# Patient Record
Sex: FEMALE | Race: WHITE | NOT HISPANIC OR LATINO | Employment: OTHER | URBAN - METROPOLITAN AREA
[De-identification: names, ages, dates, MRNs, and addresses within clinical notes are randomized per-mention and may not be internally consistent; named-entity substitution may affect disease eponyms.]

---

## 2022-05-31 ENCOUNTER — TELEPHONE (OUTPATIENT)
Dept: GYNECOLOGIC ONCOLOGY | Facility: CLINIC | Age: 77
End: 2022-05-31
Payer: MEDICARE

## 2022-05-31 NOTE — TELEPHONE ENCOUNTER
Left voicemail to call and schedule with Dr. Sharma ----- Message from Lincoln Martínez RN sent at 5/31/2022  9:40 AM CDT -----    ----- Message -----  From: Marisol Bourgeois  Sent: 5/31/2022   9:36 AM CDT  To: Zachary [facility] Staff    Please assist in scheduling pt with Dr. MAURO Sharma per referral in media.

## 2022-05-31 NOTE — TELEPHONE ENCOUNTER
----- Message from Lincoln Martínez RN sent at 5/31/2022 10:10 AM CDT -----    ----- Message -----  From: Messi Mathur  Sent: 5/31/2022   9:59 AM CDT  To: Zach[facility] Staff    Who Called:PT        Who Left Message for Patient:Erich Christiansen MA        Does the patient know what this is regarding?:YES        Best Call Back Number:[phone]        Additional Information:

## 2022-05-31 NOTE — TELEPHONE ENCOUNTER
Spoke with our patient about her insurance schedule appointment in gyn oncology she agreed she voiced understanding of the date, time and location. All questions answered appointment mail. MA/DANIEL /Preceptor Erich MENDIETA

## 2022-06-03 ENCOUNTER — OFFICE VISIT (OUTPATIENT)
Dept: GYNECOLOGIC ONCOLOGY | Facility: CLINIC | Age: 77
End: 2022-06-03
Payer: MEDICARE

## 2022-06-03 ENCOUNTER — TELEPHONE (OUTPATIENT)
Dept: SURGERY | Facility: CLINIC | Age: 77
End: 2022-06-03
Payer: MEDICARE

## 2022-06-03 ENCOUNTER — TELEPHONE (OUTPATIENT)
Dept: GYNECOLOGIC ONCOLOGY | Facility: CLINIC | Age: 77
End: 2022-06-03
Payer: MEDICARE

## 2022-06-03 ENCOUNTER — LAB VISIT (OUTPATIENT)
Dept: LAB | Facility: OTHER | Age: 77
End: 2022-06-03
Attending: OBSTETRICS & GYNECOLOGY
Payer: MEDICARE

## 2022-06-03 VITALS
DIASTOLIC BLOOD PRESSURE: 71 MMHG | SYSTOLIC BLOOD PRESSURE: 141 MMHG | WEIGHT: 293 LBS | HEART RATE: 100 BPM | BODY MASS INDEX: 48.82 KG/M2 | HEIGHT: 65 IN

## 2022-06-03 DIAGNOSIS — K92.1 HEMATOCHEZIA: ICD-10-CM

## 2022-06-03 DIAGNOSIS — R19.00 PELVIC MASS: Primary | ICD-10-CM

## 2022-06-03 LAB
ALBUMIN SERPL BCP-MCNC: 3.1 G/DL (ref 3.5–5.2)
ALP SERPL-CCNC: 94 U/L (ref 55–135)
ALT SERPL W/O P-5'-P-CCNC: 11 U/L (ref 10–44)
ANION GAP SERPL CALC-SCNC: 16 MMOL/L (ref 8–16)
AST SERPL-CCNC: 19 U/L (ref 10–40)
BASOPHILS # BLD AUTO: 0.08 K/UL (ref 0–0.2)
BASOPHILS NFR BLD: 1.4 % (ref 0–1.9)
BILIRUB SERPL-MCNC: 0.4 MG/DL (ref 0.1–1)
BUN SERPL-MCNC: 14 MG/DL (ref 8–23)
CALCIUM SERPL-MCNC: 9.8 MG/DL (ref 8.7–10.5)
CHLORIDE SERPL-SCNC: 103 MMOL/L (ref 95–110)
CO2 SERPL-SCNC: 20 MMOL/L (ref 23–29)
CREAT SERPL-MCNC: 0.7 MG/DL (ref 0.5–1.4)
DIFFERENTIAL METHOD: ABNORMAL
EOSINOPHIL # BLD AUTO: 0.1 K/UL (ref 0–0.5)
EOSINOPHIL NFR BLD: 2.1 % (ref 0–8)
ERYTHROCYTE [DISTWIDTH] IN BLOOD BY AUTOMATED COUNT: 14.8 % (ref 11.5–14.5)
EST. GFR  (AFRICAN AMERICAN): >60 ML/MIN/1.73 M^2
EST. GFR  (NON AFRICAN AMERICAN): >60 ML/MIN/1.73 M^2
GLUCOSE SERPL-MCNC: 95 MG/DL (ref 70–110)
HCT VFR BLD AUTO: 35.7 % (ref 37–48.5)
HGB BLD-MCNC: 10.7 G/DL (ref 12–16)
IMM GRANULOCYTES # BLD AUTO: 0.01 K/UL (ref 0–0.04)
IMM GRANULOCYTES NFR BLD AUTO: 0.2 % (ref 0–0.5)
LYMPHOCYTES # BLD AUTO: 1.3 K/UL (ref 1–4.8)
LYMPHOCYTES NFR BLD: 21.8 % (ref 18–48)
MCH RBC QN AUTO: 29.1 PG (ref 27–31)
MCHC RBC AUTO-ENTMCNC: 30 G/DL (ref 32–36)
MCV RBC AUTO: 97 FL (ref 82–98)
MONOCYTES # BLD AUTO: 0.6 K/UL (ref 0.3–1)
MONOCYTES NFR BLD: 10.7 % (ref 4–15)
NEUTROPHILS # BLD AUTO: 3.7 K/UL (ref 1.8–7.7)
NEUTROPHILS NFR BLD: 63.8 % (ref 38–73)
NRBC BLD-RTO: 0 /100 WBC
PLATELET # BLD AUTO: 148 K/UL (ref 150–450)
PMV BLD AUTO: 12.1 FL (ref 9.2–12.9)
POTASSIUM SERPL-SCNC: 4.2 MMOL/L (ref 3.5–5.1)
PROT SERPL-MCNC: 6.5 G/DL (ref 6–8.4)
RBC # BLD AUTO: 3.68 M/UL (ref 4–5.4)
SODIUM SERPL-SCNC: 139 MMOL/L (ref 136–145)
WBC # BLD AUTO: 5.77 K/UL (ref 3.9–12.7)

## 2022-06-03 PROCEDURE — 36415 COLL VENOUS BLD VENIPUNCTURE: CPT | Performed by: OBSTETRICS & GYNECOLOGY

## 2022-06-03 PROCEDURE — 99205 OFFICE O/P NEW HI 60 MIN: CPT | Mod: S$PBB,,, | Performed by: OBSTETRICS & GYNECOLOGY

## 2022-06-03 PROCEDURE — 80053 COMPREHEN METABOLIC PANEL: CPT | Performed by: OBSTETRICS & GYNECOLOGY

## 2022-06-03 PROCEDURE — 99999 PR PBB SHADOW E&M-EST. PATIENT-LVL III: CPT | Mod: PBBFAC,,, | Performed by: OBSTETRICS & GYNECOLOGY

## 2022-06-03 PROCEDURE — 99213 OFFICE O/P EST LOW 20 MIN: CPT | Mod: PBBFAC | Performed by: OBSTETRICS & GYNECOLOGY

## 2022-06-03 PROCEDURE — 99999 PR PBB SHADOW E&M-EST. PATIENT-LVL III: ICD-10-PCS | Mod: PBBFAC,,, | Performed by: OBSTETRICS & GYNECOLOGY

## 2022-06-03 PROCEDURE — 85025 COMPLETE CBC W/AUTO DIFF WBC: CPT | Performed by: OBSTETRICS & GYNECOLOGY

## 2022-06-03 PROCEDURE — 99205 PR OFFICE/OUTPT VISIT, NEW, LEVL V, 60-74 MIN: ICD-10-PCS | Mod: S$PBB,,, | Performed by: OBSTETRICS & GYNECOLOGY

## 2022-06-03 RX ORDER — LEVOFLOXACIN 500 MG/1
500 TABLET, FILM COATED ORAL DAILY
COMMUNITY
Start: 2022-05-12 | End: 2022-07-21

## 2022-06-03 RX ORDER — ASPIRIN 81 MG/1
81 TABLET ORAL DAILY
COMMUNITY

## 2022-06-03 RX ORDER — MULTIVITAMIN
1 TABLET ORAL DAILY
Status: ON HOLD | COMMUNITY
End: 2022-08-19 | Stop reason: HOSPADM

## 2022-06-03 RX ORDER — TELMISARTAN AND HYDROCHLORTHIAZIDE 40; 12.5 MG/1; MG/1
1 TABLET ORAL DAILY
COMMUNITY
Start: 2021-10-08 | End: 2022-07-21

## 2022-06-03 RX ORDER — FERROUS SULFATE 325(65) MG
325 TABLET ORAL DAILY
Status: ON HOLD | COMMUNITY
End: 2022-08-19 | Stop reason: HOSPADM

## 2022-06-03 RX ORDER — LANOLIN ALCOHOL/MO/W.PET/CERES
1000 CREAM (GRAM) TOPICAL DAILY
Status: ON HOLD | COMMUNITY
End: 2022-08-19 | Stop reason: HOSPADM

## 2022-06-03 RX ORDER — OXYBUTYNIN CHLORIDE 10 MG/1
1 TABLET, EXTENDED RELEASE ORAL DAILY
COMMUNITY
Start: 2021-06-18 | End: 2022-06-17

## 2022-06-03 RX ORDER — METOPROLOL SUCCINATE 25 MG/1
1 TABLET, EXTENDED RELEASE ORAL DAILY
COMMUNITY
Start: 2022-05-12 | End: 2023-05-11

## 2022-06-03 RX ORDER — CEPHALEXIN 500 MG/1
500 CAPSULE ORAL EVERY 4 HOURS PRN
COMMUNITY
Start: 2022-05-22 | End: 2022-07-21

## 2022-06-03 RX ORDER — DOXYCYCLINE 100 MG/1
100 CAPSULE ORAL 2 TIMES DAILY
COMMUNITY
Start: 2022-05-25 | End: 2022-07-21

## 2022-06-03 RX ORDER — ACETAMINOPHEN 500 MG
5000 TABLET ORAL DAILY
Status: ON HOLD | COMMUNITY
End: 2022-08-19 | Stop reason: HOSPADM

## 2022-06-03 NOTE — PROGRESS NOTES
Please let her know that blood count is reassuring and actually increased from hospitalization.  We will work to coordinate next steps with colorectal.

## 2022-06-03 NOTE — TELEPHONE ENCOUNTER
----- Message from James Bradley sent at 6/3/2022  4:11 PM CDT -----  Type:  Patient Returning Call         Who Called: JAKOB CONNELL [[id number]]         Who Left Message for Patient: Yahaira         Does the patient know what this is regarding? Y         Best Call Back Number:[phone]         Additional Information:

## 2022-06-03 NOTE — PROGRESS NOTES
REFERRING PROVIDER  Leland Song MD     COLORECTAL  Clinton Shoemaker MD;  Galen Davis MD    HISTORY OF PRESENT CONDITION  CC: Pelvic Mass, Hematochezia, History of Endometrial Cancer  Linda Pierson is a 77 y.o. with an 8.5 cm mass of lying between the vagina and rectosigmoid.  She has a history of Stage IAG1 endometrial cancer in 9/13/2016 treated with robotic hysterectomy and staging by Dr. Briscoe at [facility].  She states she has had multiple episodes of BRBPR since being diagnosed and hospitalized.     DATA REVIEWED  5/9/2022 CT AP: 1. An 8.5 x 7.5 cm mass within the pelvis is noted and hemorrhage into  an abscess is suspected.  2. Large ventral abdominal wall hernia containing bowel loops is noted.  3. Postoperative changes     5/10/2022 MR Pelvis:  1. There is circumferential mural thickening and hyperenhancement of  the upper rectum as it abuts a 7.8 cm mass within the pelvis. No obvious malignancy can be documented.The bulk of this lesion appears to lie above the peritoneal reflection  2. Large ventral abdominal wall hernia    5/12/2022 Vaginal Cuff Biopsy:  -Fibroadipose tissue with ulcer and granulation tissue.  -Negative for carcinoma.  Rectal mass, biopsy:  -Necroinflammatory debris (see comment).   -No mucosa present for evaluation.  -Negative for carcinoma.    OF NOTE:  History of Aortic Stenosis with Atrial Fibrillation, was on Eliquis.      Past Medical History:   Diagnosis Date    Anemia     Hypertension       Current Outpatient Medications   Medication Sig    apixaban (ELIQUIS) 5 mg Tab Take 1 tablet by mouth 2 (two) times daily.    aspirin (ECOTRIN) 81 MG EC tablet Take 81 mg by mouth Daily.    calcium-vitamin D 600 mg-10 mcg (400 unit) Tab Take 1 tablet by mouth once daily.    cephALEXin (KEFLEX) 500 MG capsule Take 500 mg by mouth every 4 (four) hours as needed.    cholecalciferol, vitamin D3, 125 mcg (5,000 unit) Tab Take 5,000 Units by mouth Daily.     cyanocobalamin (VITAMIN B-12) 1000 MCG tablet Take 1,000 mcg by mouth Daily.    doxycycline (VIBRAMYCIN) 100 MG Cap Take 100 mg by mouth 2 (two) times daily.    ferrous sulfate (FEOSOL) 325 mg (65 mg iron) Tab tablet Take 325 mg by mouth Daily.    levoFLOXacin (LEVAQUIN) 500 MG tablet Take 500 mg by mouth Daily.    metoprolol succinate (TOPROL-XL) 25 MG 24 hr tablet Take 1 tablet by mouth once daily.    oxybutynin (DITROPAN-XL) 10 MG 24 hr tablet Take 1 tablet by mouth once daily.    telmisartan-hydrochlorothiazide (MICARDIS HCT) 40-12.5 mg per tablet Take 1 tablet by mouth once daily.     No current facility-administered medications for this visit.     Review of patient's allergies indicates:  No Known Allergies    Past Surgical History:   Procedure Laterality Date    APPENDECTOMY  1991    [facility], [place]    BILATERAL SALPINGO-OOPHORECTOM[place] (BSO) [place]     SECTION  1967    [place], [place]    G[place]      [place]    GASTRIC BYPASS  1988    [facility] [place]    HERNIA REPAIR  1988    [facility]oval [facility] excess fat ([facility] [place]    HERNIA REPAIR  2005    [employer] ([place]    HERN[place] REPAIR  2018    [facility] [place]    HYST[place]Y [place]ilateral [place]    KNEE SURGERY Right 2004    [employer], ([place]    KNEE SURGERY Left 2013    [employer] ([place]      FAMILY HISTORY  History reviewed. No pertinent family history.    SOCIAL HISTORY    reports that she has never smoked. She has never used smokeless tobacco. She reports previous alcohol use. She reports previous drug use.    OBJECTIVE   Vitals:    22 0948   BP: (!) 141/71   Pulse: 100      Body mass index is 51.42 kg/m².     Physical Exam:   Constitutional: She is oriented to person, place, and time. She appears well-developed and well-nourished. No distress.    HENT:    Head: Normocephalic and atraumatic.    Eyes: Conjunctivae and EOM are normal.      Pulmonary/Chest: Effort normal. No respiratory distress.        Abdominal: Soft. She exhibits no distension and no mass. There is no abdominal tenderness. There is no rebound and no guarding. No hernia.     Genitourinary:    Genitourinary Comments: Vulvar - normal  Vagina - Abnormal tissue mass at vaginal cuff but very limited exam due to pain and blood obscuring visualization                 Neurological: She is alert and oriented to person, place, and time.    Skin: No rash noted.    Psychiatric: She has a normal mood and affect. Her behavior is normal.     LABORATORY DATA  Lab data reviewed.    RADIOLOGICAL DATA  Radiology data reviewed.    PATHOLOGY DATA  Pathology data reviewed.    ASSESSMENT    1. Pelvic mass    2. Hematochezia     Large pelvic mass at vaginal cuff involving rectum of uncertain etiology.  Recurrence of stage I endometrial cancer is rare, but certainly a possibility though biopsies thus far have been indeterminate.  I am recommending combined procedure with colorectal surgery likely involving LAR with upper vaginectomy.  Patient is to be seen by Dr. Davis with colorectal and he and I will work to coordinate.     PLAN  Orders Placed This Encounter   Procedures    CBC Auto Differential    COMPREHENSIVE METABOLIC PANEL       1.  Follow-up with Dr. Davis  2.  Will coordinate likely surgery with Dr. Davis after his consultation      Stephan Sharma MD

## 2022-06-03 NOTE — TELEPHONE ENCOUNTER
Pt advise of Dr. Sharma's advice regarding lab work, verbalize understanding, and has no further questions.

## 2022-06-03 NOTE — TELEPHONE ENCOUNTER
"Explained to pt that we will need:  1) disk containing imaging of MRI pelvis 5/10/22, CT ab/pel 5-9-22 and CT c-a-p 1/3/18  2) photos taken during 5/11/22 scope    Annalisa from Dr Shoemaker's office said these can be prepared for her to  and bring to appointment.  Pt said she will call them to set date for  and she'll call to schedule apptmnt with Dr Elias.      =====================  From: SALAS Elias M.D. <[email or web address]>   Sent: Tuesday, May 31, 2022 3:39 PM  To: Renee Jacob <[email or web address]>; Joyce Caceres <[email or web address]>    Thank you.  The images would be perfect along with the op notes. Thank you for your help with this!!    Galen Elias MD        From: Renee Jacob <[email or web address]>  Sent: Tuesday, May 31, 2022 3:25:48 PM  To: SALAS Elias M.D. <[email or web address]>; Joyce Caceres <[email or web address]>  Subject: FW: GOPI Pierson [id number]      [facility] referral (see media and Care Everywhere).    Dr Clinton Shoemaker referring to Dr. Elias and Dr Song referring to Dr. Sharma as this case "requires multi-D care at a tertiary care center..."  Referrals mentioned this case was discussed with Boogie Elias and Zachary.    ==================================  Hx of Grade I, Stage IA Endometrial Cancer  S/P TRH BSO Staging by Dr. Briscoe at [facility] 9/2016  Hematochezia   Mass at vaginal cuff impinging on rectum  Rule out recurrent cancer which is unlikely but possible vs diverticular abscess  S/P EUA and true cut biopsies of pelvic mass  ==================================    DR. ELIAS: no CRC established.  Will you be needing imaging disk (MRI pelvis and CT abd/pel)? Anything else?  Care Everywhere shows 5/11 they did transvaginal biopsy, EUA, proctoscopy and sigmoidoscopy with bx.    PATH: A.  Pelvic mass/vaginal cuff, biopsy:-Fibroadipose tissue with ulcer and granulation " tissue.-Negative for carcinoma.   B.  Rectal mass, biopsy:-Necroinflammatory debris (see comment).   -No mucosa present for evaluation.-Negative for carcinoma.Comment: The patient's history is reviewed.  Rebiopsy is recommended if clinically indicated.    [place]: cc'ing you as we may have to schedule back-to-back gyn onc and CRS apptmnts

## 2022-06-03 NOTE — TELEPHONE ENCOUNTER
----- Message from Stephan Sharma MD sent at 6/3/2022  1:12 PM CDT -----  Please let her know that blood count is reassuring and actually increased from hospitalization.  We will work to coordinate next steps with colorectal.

## 2022-06-09 ENCOUNTER — TELEPHONE (OUTPATIENT)
Dept: SURGERY | Facility: CLINIC | Age: 77
End: 2022-06-09
Payer: MEDICARE

## 2022-06-10 ENCOUNTER — OFFICE VISIT (OUTPATIENT)
Dept: SURGERY | Facility: CLINIC | Age: 77
End: 2022-06-10
Payer: MEDICARE

## 2022-06-10 ENCOUNTER — HOSPITAL ENCOUNTER (OUTPATIENT)
Dept: RADIOLOGY | Facility: HOSPITAL | Age: 77
Discharge: HOME | End: 2022-06-10
Attending: COLON & RECTAL SURGERY
Payer: MEDICARE

## 2022-06-10 VITALS
HEIGHT: 65 IN | SYSTOLIC BLOOD PRESSURE: 142 MMHG | HEART RATE: 100 BPM | WEIGHT: 293 LBS | BODY MASS INDEX: 48.82 KG/M2 | DIASTOLIC BLOOD PRESSURE: 78 MMHG

## 2022-06-10 DIAGNOSIS — M79.89 SWELLING OF LOWER EXTREMITY: ICD-10-CM

## 2022-06-10 DIAGNOSIS — K62.89 RECTAL MASS: Primary | ICD-10-CM

## 2022-06-10 DIAGNOSIS — K62.89 RECTAL MASS: ICD-10-CM

## 2022-06-10 DIAGNOSIS — I48.0 PAROXYSMAL ATRIAL FIBRILLATION: ICD-10-CM

## 2022-06-10 PROCEDURE — 99205 OFFICE O/P NEW HI 60 MIN: CPT | Mod: S$PBB,,, | Performed by: COLON & RECTAL SURGERY

## 2022-06-10 PROCEDURE — 88342 IMHCHEM/IMCYTCHM 1ST ANTB: CPT | Mod: 26,,, | Performed by: PATHOLOGY

## 2022-06-10 PROCEDURE — 99205 PR OFFICE/OUTPT VISIT, NEW, LEVL V, 60-74 MIN: ICD-10-PCS | Mod: S$PBB,,, | Performed by: COLON & RECTAL SURGERY

## 2022-06-10 PROCEDURE — 72195 MRI PELVIS W/O DYE: CPT | Mod: 26,,, | Performed by: RADIOLOGY

## 2022-06-10 PROCEDURE — 99999 PR PBB SHADOW E&M-EST. PATIENT-LVL III: CPT | Mod: PBBFAC,,, | Performed by: COLON & RECTAL SURGERY

## 2022-06-10 PROCEDURE — 72195 MRI RECTAL CANCER WITHOUT CONTRAST: ICD-10-PCS | Mod: 26,,, | Performed by: RADIOLOGY

## 2022-06-10 PROCEDURE — 88305 TISSUE EXAM BY PATHOLOGIST: ICD-10-PCS | Mod: 26,,, | Performed by: PATHOLOGY

## 2022-06-10 PROCEDURE — 72195 MRI PELVIS W/O DYE: CPT | Mod: TC

## 2022-06-10 PROCEDURE — 99999 PR PBB SHADOW E&M-EST. PATIENT-LVL III: ICD-10-PCS | Mod: PBBFAC,,, | Performed by: COLON & RECTAL SURGERY

## 2022-06-10 PROCEDURE — 88305 TISSUE EXAM BY PATHOLOGIST: CPT | Mod: 26,,, | Performed by: PATHOLOGY

## 2022-06-10 PROCEDURE — 88342 IMHCHEM/IMCYTCHM 1ST ANTB: CPT | Performed by: PATHOLOGY

## 2022-06-10 PROCEDURE — 88342 CHG IMMUNOCYTOCHEMISTRY: ICD-10-PCS | Mod: 26,,, | Performed by: PATHOLOGY

## 2022-06-10 PROCEDURE — 99213 OFFICE O/P EST LOW 20 MIN: CPT | Mod: PBBFAC,25 | Performed by: COLON & RECTAL SURGERY

## 2022-06-10 PROCEDURE — 88305 TISSUE EXAM BY PATHOLOGIST: CPT | Performed by: PATHOLOGY

## 2022-06-10 NOTE — PROGRESS NOTES
CRS Office Visit History and Physical    Referring Md:   Clinton Shoemaker Md  [address] [place] [address] [address]  [place] [address]    SUBJECTIVE:     Chief Complaint:  Pelvic mass    History of Present Illness:  The patient is a new patient to this practice.   Course is as follows:  Patient is a 77 y.o. female presents with pelvic mass  History of endometrial cancer which was completely excised and 2016.  She is on therapeutic anticoagulation secondary to AFib.  Presented with hematochezia in May 2022.  Workup demonstrated a Large necrotic pelvic mass with intrusion into the rectum  05/11/2022:  Exam under anesthesia with multiple biopsies of a pelvic/vaginal cuff mass.  Flexible sigmoidoscopy demonstrated a mass in the rectosigmoid colon which appeared extrinsic with compression of the rectosigmoid colon starting at approximately 12 cm in the anal verge.  Biopsies performed demonstrate necroinflammatory debris with no sign of carcinoma.    She presents for evaluation with her daughter and .  She is predominantly wheelchair bound secondary to significant bilateral lower extremity swelling over the past several weeks.  She was previously on Eliquis but stopped secondary to hematochezia.  Complains of pain and swelling in her bilateral lower extremities.  DVT ultrasound of 05/21/2022 demonstrated no DVT. Spending the majority of her day in the house on the toilet secondary to fecal urgency and leakage.  Historically, she had daily bowel movements.  Incontinent to liquid stool.  No tobacco abuse.  No family history of colon cancer or inflammatory bowel disease.    Echo 05/10/2022 demonstrated normal ejection fraction with no aortic stenosis    Multiple past abdominal surgeries. 2018 underwent ventral hernia repair with a large piece of mesh implanted.  Hernia since recurred.    Review of patient's allergies indicates:  No Known Allergies    Past Medical History:   Diagnosis Date    Anemia     Aortic stenosis   "   Hypertension     Paroxysmal atrial fibrillation     Uterine cancer      Past Surgical History:   Procedure Laterality Date    APPEN[place]CTOMY  1991    [facility], [place]    BILATERAL SALPINGO-OOPHORECTOMY (BSO) [place]ilateral [place]    [place]     SECTION  1967    [facility] [place] [place]    GALLBLADDER SURGERY      [facility], [place], [place]    [place]  [facility]    [facility] [place]    HERNIA REPAIR  1988    Removal of excess fat ([facility], [place]    HERNIA REPAIR  2005    [employer] ([place]    HERNIA REPAIR  2018    [facility] [place]    HY[place]    KNEE SURGERY Right 2004    [employer], ([place]    KNEE SURGERY Left 2013    [employer], ([place]     No family history on file.  Social History     Tobacco Use    Smoking status: Never Smoker    Smokeless tobacco: Never Used   Substance Use Topics    Alcohol use: Not Currently    Drug use: Not Currently        Review of Systems:  Review of Systems   Constitutional: Positive for malaise/fatigue. Negative for chills, diaphoresis, fever and weight loss.   HENT: Negative for congestion.    Respiratory: Negative for shortness of breath.    Cardiovascular: Positive for leg swelling. Negative for chest pain.   Gastrointestinal: Positive for abdominal pain, blood in stool and diarrhea. Negative for constipation, nausea and vomiting.   Genitourinary: Negative for dysuria.   Musculoskeletal: Positive for joint pain. Negative for back pain and myalgias.   Skin: Negative for rash.   Neurological: Positive for weakness. Negative for dizziness.   Endo/Heme/Allergies: Does not bruise/bleed easily.   Psychiatric/Behavioral: Negative for depression.       OBJECTIVE:     Vital Signs (Most Recent)  BP (!) 142/78 (BP Location: Left arm, Patient Position: Sitting, BP Method: Medium (Manual))   Pulse 100   Ht 5' 5" (1.651 m)   Wt " (!) 143.3 kg (315 lb 14.4 oz)   BMI 52.57 kg/m²     Physical Exam:  General: White female in no distress   Neuro: alert and oriented x 4.  Moves all extremities.     HEENT: no icterus.  Trachea midline  Respiratory: respirations are even and unlabored  Cardiac: regular rate  Abdomen:  Protuberant abdomen with large midline incisional hernia that is tense.  Extremities: Warm dry and intact.  Bilateral lower extremity tense edema  Skin:  Rash on bilateral lower extremities  Anorectal:  External exam was normal.  Digital exam performed.  Normal tone.  Normal squeeze pressure.    Labs:  H&H 11 and 36. Albumin 3.1.  Normal renal function.  CEA 3.26.   mildly elevated at 46    Imaging:   MRI 5/10/22: 7.8 x 6.4 x 7.4cm mass abutting the upper rectum.  Bulk of the lesion above the peritoneal reflection.  No abnormal lymph nodes.  CT abdomen pelvis 05/09/2022  large mass in the pelvis with a large ventral abdominal wall hernia.    ASSESSMENT/PLAN:     Linda was seen today for mass.    Diagnoses and all orders for this visit:    Rectal mass  -     Cancel: MRI Rectal Cancer W W/O Contrast; Future  -     Specimen to Pathology Gastrointestinal tract    Paroxysmal atrial fibrillation    Swelling of lower extremity        77 y.o. female with mid rectal necrotic tumor of unclear etiology.  Biopsies pending.  Based on outside hospital imaging, no signs of metastatic disease.  Tumor appears very symptomatic to her causing frequent tenesmus, rectal leakage, and is likely related to the increase in her bilateral lower extremity swelling.      Functionally, she is very limited secondary to significant bilateral lower extremity swelling that has increased over the past several weeks.  She is predominantly wheelchair bound.  Previously, she was ambulatory.  Her echocardiogram demonstrated normal ejection fraction with no significant aortic stenosis.  DVT ultrasound negative.  I will reach out to her cardiologist to see if there is  any potential optimization prior to intervention.  Further, repeat MRI done here.    I was unable to load her images from outside hospital.  Will have those loaded for review.  Discussed that she would likely need proctectomy with partial vaginectomy as well as abdominal wall reconstruction.  Discussed this would be a very extensive surgery and I am concern for her ability to tolerate a the long major abdominal surgery.  Discussed that she would likely need temporary versus permanent fecal diversion.  With her limited mobility, I am concerned regarding her function postoperatively after a low anterior resection.  Will discuss with her further regarding potential end colostomy.    I will follow up with her once her images were loaded in her MRI has been reviewed.  Additionally, I will reach out to her cardiologist for optimization.  Lastly, if she has been medically optimized, will plan to coordinate with Dr. Sharma regarding multivisceral resection and abdominal wall reconstruction with likely end colostomy.    Flexible sigmoidoscopy:  Following digital rectal exam, flexible endoscope was inserted advanced up to 30 cm.  At approximately 8 cm, large, necrotic tumor seen.  Biopsies taken.  Distal rectum appears clear.    SALAS Davis MD, FACS, FASCRS  Staff Surgeon  Colon & Rectal Surgery

## 2022-06-15 LAB
FINAL PATHOLOGIC DIAGNOSIS: NORMAL
GROSS: NORMAL
Lab: NORMAL

## 2022-06-21 ENCOUNTER — TELEPHONE (OUTPATIENT)
Dept: SURGERY | Facility: CLINIC | Age: 77
End: 2022-06-21
Payer: MEDICARE

## 2022-06-22 ENCOUNTER — TELEPHONE (OUTPATIENT)
Dept: SURGERY | Facility: CLINIC | Age: 77
End: 2022-06-22
Payer: MEDICARE

## 2022-06-22 ENCOUNTER — DOCUMENTATION ONLY (OUTPATIENT)
Dept: SURGERY | Facility: CLINIC | Age: 77
End: 2022-06-22
Payer: MEDICARE

## 2022-06-22 DIAGNOSIS — K62.89 RECTAL MASS: Primary | ICD-10-CM

## 2022-06-22 NOTE — TELEPHONE ENCOUNTER
Left voicemail with callback number in reference to scheduling CT prior to surgery. Instructed to call back.

## 2022-06-22 NOTE — TELEPHONE ENCOUNTER
----- Message from SALAS Davis MD sent at 6/22/2022  7:23 AM CDT -----  Can she be set up for a contrasted CT here?  It will need to be done at [facility] for preoperative planning and they have agreed to give her contrast.       Just please let her know that this will be a different CT from what she has had and that we need it to plan a resection for her.  I will call her once the scan is done.    Thank you!    Galen

## 2022-06-22 NOTE — PROGRESS NOTES
[facility]  Colon, Rectal and Anal Malignancies  Multi-disciplinary Tumor Board     [address]  [place], [place]  Tel: [phone]  Fax: [phone]  [email or web address]     Patient name: Shira Pierson   YOB: 1945   MRN: [id number]    Date of discussion: 06/22/2022    Thank you for referring Ms. Pierson to our care here at [facility]. Please allow us to summarize our review of records and recommendations.    The following disciplines were present for the discussion:    Colon and Rectal Surgery   Medical Oncology   Radiation Oncology   Pathology   Radiology    Endoscopy reviewed:    Date performed: 5/11/2022   Yes, incomplete evaluation of colon and rectum performed    CT Chest, Abdomen and Pelvis    Date performed: 5/9/2022    Performed at our facility: No   Metastatic disease present: Yes, suspected    MRI Rectal Cancer Protocol   Date performed: 6/10/2022    Performed at our facility: No   Tumor location in the rectum: Not performed   Sphincter involvement: Not performed   Pretreatment circumferential resection margin status: Not performed    Pathology reviewed:    Date pathology finalized: 6/15/2022   Yes, report only     Pre-treatment CEA:   Date performed: 5/9/2022   CEA Level: 3.26    Pre-treatment Clinical Stage:   TX - not assessed   NX - not assessed   M1 - Metastasis suspected or confirmed    Based on our review of the current information we have made the following recommendations:  Summary: 77F c history of endometrial cancer s/p resection (2016) now with hematochezia and workup demonstrating a large necrotic pelvic tumor with intrusion into the rectum. Prior biopsies notable for necrotic inflammatory tissue, although high concern for malignancy based on imaging. Plan to repeat imaging (CT with IV, PO, and Rectal contrast) and consult IR for possible biopsy to obtain tissue diagnosis, however surgery may be required to palliate  symptoms.      Additional laboratory, imaging, or endoscopic studies   CT Abdomen and Pelvis   Confirm pathologic diagnosis    Therapies   Pending further workup (CT, IR biopsy)    Clinical research study eligibility - No    Referrals   Interventional Radiology    Please do not hesitate to contact us for any questions.

## 2022-06-23 ENCOUNTER — TELEPHONE (OUTPATIENT)
Dept: SURGERY | Facility: CLINIC | Age: 77
End: 2022-06-23
Payer: MEDICARE

## 2022-06-23 NOTE — TELEPHONE ENCOUNTER
Spoke with patient and explained situation of having CT with contrast in [place]. Patient agreeable to plan and verbalizes understanding. Instructed patient that I will be in touch with her once CT is scheduled via radiology (due to shortage). Secure chat sent to Dr. Baer for approval.

## 2022-06-23 NOTE — TELEPHONE ENCOUNTER
----- Message from Jennifer Pinto sent at 6/23/2022  8:12 AM CDT -----  Contact: pt  Pt returning call to staff. RE: please give pt another call.      Confirmed contact below:  Contact Name:Linda Pierson  Phone Number: [phone]

## 2022-06-23 NOTE — TELEPHONE ENCOUNTER
----- Message from SALAS Davis MD sent at 6/22/2022  7:23 AM CDT -----  Can she be set up for a contrasted CT here?  It will need to be done at [facility] for preoperative planning and they have agreed to give her contrast.       Just please let her know that this will be a different CT from what she has had and that we need it to plan a resection for her.  I will call her once the scan is done.    Thank you!    Galen

## 2022-06-23 NOTE — TELEPHONE ENCOUNTER
Spoke with patient and confirmed CT appointment details verbally over phone. Patient verbalizes understanding.

## 2022-06-27 ENCOUNTER — HOSPITAL ENCOUNTER (OUTPATIENT)
Dept: RADIOLOGY | Facility: HOSPITAL | Age: 77
Discharge: HOME | End: 2022-06-27
Attending: COLON & RECTAL SURGERY
Payer: MEDICARE

## 2022-06-27 DIAGNOSIS — K62.89 RECTAL MASS: ICD-10-CM

## 2022-06-27 PROCEDURE — 74177 CT ABD & PELVIS W/CONTRAST: CPT | Mod: 26,,, | Performed by: STUDENT IN AN ORGANIZED HEALTH CARE EDUCATION/TRAINING PROGRAM

## 2022-06-27 PROCEDURE — 74177 CT ABD & PELVIS W/CONTRAST: CPT | Mod: TC

## 2022-06-27 PROCEDURE — 25500020 PHARM REV CODE 255: Performed by: COLON & RECTAL SURGERY

## 2022-06-27 PROCEDURE — 74177 CT ABDOMEN PELVIS WITH CONTRAST: ICD-10-PCS | Mod: 26,,, | Performed by: STUDENT IN AN ORGANIZED HEALTH CARE EDUCATION/TRAINING PROGRAM

## 2022-06-27 RX ADMIN — IOHEXOL 75 ML: 350 INJECTION, SOLUTION INTRAVENOUS at 03:06

## 2022-06-27 RX ADMIN — IOHEXOL 60 ML: 350 INJECTION, SOLUTION INTRAVENOUS at 03:06

## 2022-06-30 ENCOUNTER — TELEPHONE (OUTPATIENT)
Dept: SURGERY | Facility: CLINIC | Age: 77
End: 2022-06-30
Payer: MEDICARE

## 2022-06-30 DIAGNOSIS — K76.9 LIVER LESION: ICD-10-CM

## 2022-06-30 DIAGNOSIS — R19.00 PELVIC MASS: Primary | ICD-10-CM

## 2022-06-30 NOTE — TELEPHONE ENCOUNTER
Spoke with patient. States that she missed Dr Davis's call yesterday in regards to CT results and plan of care. Epic message sent to Dr Davis.

## 2022-06-30 NOTE — TELEPHONE ENCOUNTER
Called patient to discuss recent CT scan.  Multiple small liver hypodensities noted.  Spoke with Interventional Radiology who believes that the liver lesions are amenable to ultrasound-guided biopsy.  Discussed that we will plan for liver biopsy 1st to determine if the pelvic mass is malignant and can be treated with preoperative radiation or chemotherapy as she is at very high risk for perioperative complication.  I will follow up with her after the liver biopsy is done.    SALAS Davis MD, FACS, FASCRS  Staff Surgeon  Colon & Rectal Surgery

## 2022-07-01 ENCOUNTER — TELEPHONE (OUTPATIENT)
Dept: INTERVENTIONAL RADIOLOGY/VASCULAR | Facility: CLINIC | Age: 77
End: 2022-07-01
Payer: MEDICARE

## 2022-07-21 ENCOUNTER — LAB VISIT (OUTPATIENT)
Dept: LAB | Facility: HOSPITAL | Age: 77
End: 2022-07-21
Payer: MEDICARE

## 2022-07-21 ENCOUNTER — OFFICE VISIT (OUTPATIENT)
Dept: INTERVENTIONAL RADIOLOGY/VASCULAR | Facility: CLINIC | Age: 77
End: 2022-07-21
Payer: MEDICARE

## 2022-07-21 VITALS — HEART RATE: 116 BPM | SYSTOLIC BLOOD PRESSURE: 103 MMHG | DIASTOLIC BLOOD PRESSURE: 61 MMHG

## 2022-07-21 DIAGNOSIS — K76.9 LIVER LESION: ICD-10-CM

## 2022-07-21 DIAGNOSIS — R19.00 PELVIC MASS: ICD-10-CM

## 2022-07-21 LAB
BASOPHILS # BLD AUTO: 0.03 K/UL (ref 0–0.2)
BASOPHILS NFR BLD: 0.2 % (ref 0–1.9)
DIFFERENTIAL METHOD: ABNORMAL
EOSINOPHIL # BLD AUTO: 0 K/UL (ref 0–0.5)
EOSINOPHIL NFR BLD: 0.2 % (ref 0–8)
ERYTHROCYTE [DISTWIDTH] IN BLOOD BY AUTOMATED COUNT: 16.6 % (ref 11.5–14.5)
HCT VFR BLD AUTO: 27.7 % (ref 37–48.5)
HGB BLD-MCNC: 8.6 G/DL (ref 12–16)
IMM GRANULOCYTES # BLD AUTO: 0.16 K/UL (ref 0–0.04)
IMM GRANULOCYTES NFR BLD AUTO: 0.8 % (ref 0–0.5)
INR PPP: 1 (ref 0.8–1.2)
LYMPHOCYTES # BLD AUTO: 1.6 K/UL (ref 1–4.8)
LYMPHOCYTES NFR BLD: 8.3 % (ref 18–48)
MCH RBC QN AUTO: 26.7 PG (ref 27–31)
MCHC RBC AUTO-ENTMCNC: 31 G/DL (ref 32–36)
MCV RBC AUTO: 86 FL (ref 82–98)
MONOCYTES # BLD AUTO: 1.5 K/UL (ref 0.3–1)
MONOCYTES NFR BLD: 7.6 % (ref 4–15)
NEUTROPHILS # BLD AUTO: 16.5 K/UL (ref 1.8–7.7)
NEUTROPHILS NFR BLD: 82.9 % (ref 38–73)
NRBC BLD-RTO: 0 /100 WBC
PLATELET # BLD AUTO: 462 K/UL (ref 150–450)
PMV BLD AUTO: 10 FL (ref 9.2–12.9)
PROTHROMBIN TIME: 10.5 SEC (ref 9–12.5)
RBC # BLD AUTO: 3.22 M/UL (ref 4–5.4)
WBC # BLD AUTO: 19.86 K/UL (ref 3.9–12.7)

## 2022-07-21 PROCEDURE — 36415 COLL VENOUS BLD VENIPUNCTURE: CPT | Performed by: FAMILY MEDICINE

## 2022-07-21 PROCEDURE — 85025 COMPLETE CBC W/AUTO DIFF WBC: CPT | Performed by: FAMILY MEDICINE

## 2022-07-21 PROCEDURE — 99213 OFFICE O/P EST LOW 20 MIN: CPT | Mod: PBBFAC | Performed by: FAMILY MEDICINE

## 2022-07-21 PROCEDURE — 99203 PR OFFICE/OUTPT VISIT, NEW, LEVL III, 30-44 MIN: ICD-10-PCS | Mod: S$PBB,,, | Performed by: FAMILY MEDICINE

## 2022-07-21 PROCEDURE — 99203 OFFICE O/P NEW LOW 30 MIN: CPT | Mod: S$PBB,,, | Performed by: FAMILY MEDICINE

## 2022-07-21 PROCEDURE — 85610 PROTHROMBIN TIME: CPT | Performed by: FAMILY MEDICINE

## 2022-07-21 PROCEDURE — 99999 PR PBB SHADOW E&M-EST. PATIENT-LVL III: ICD-10-PCS | Mod: PBBFAC,,, | Performed by: FAMILY MEDICINE

## 2022-07-21 PROCEDURE — 99999 PR PBB SHADOW E&M-EST. PATIENT-LVL III: CPT | Mod: PBBFAC,,, | Performed by: FAMILY MEDICINE

## 2022-07-21 NOTE — LETTER
July 21, 2022    Linda Pierson  [address] [place] [address]     [facility] [address]  [address]  [facility] [address]  Phone: [phone] PRE-PROCEDURE INSTRUCTIONS    Your procedure with Interventional Radiology is scheduled for 8/15/2022. Please arrive by 9:30am. Please note your appointment time in [facility] will be different.    You must check-in and receive a wristband before going to your procedure. We will call you the day before to let you know your check-in location.    DO NOT take aspirin for 5 days before your procedure.    **Do not eat or drink anything between midnight and the time of your procedure. This includes gum, mints, and candy lemon drops.    **Do not smoke or drink alcoholic beverages 24 hours prior to your procedure.    **If you wear contact lenses, dentures, hearing aids, or glasses, bring a container to put them in during the procedure and give them to a family member for safekeeping.    **If you have been diagnosed with sleep apnea please bring your CPAP machine.    **If your doctor has scheduled you for an overnight stay, bring a small overnight bag with any personal items that you may need.    **Make arrangements in advance for transportation home by a responsible adult. It is not safe to drive a vehicle during the 24 hours following the procedure.    **All [facility] facilities and properties are tobacco free. Smoking is NOT allowed.    PLEASE NOTE: The procedure schedule has many variables which affect the time of your procedure. Family members should be available if your surgery time changes.    If you have any questions about these instructions call Interventional Radiology at [phone] Monday - Friday between 8:00am and 4:00pm or [phone] (ask for interventional radiology resident) for after hours.

## 2022-07-21 NOTE — Clinical Note
"Thank you for referring Ms. Pierson to Interventional Radiology at the [facility]. Please don't hesitate to contact us if there are any questions regarding this evaluation at [phone]. If you have any other patients for whom you would like a consultation, please place an order for "[id number]", and we will be happy to review their case.  Sincerely, CELY Grullon, FNP Interventional Radiology   "

## 2022-07-21 NOTE — PROGRESS NOTES
"Subjective:       Patient ID: Linda Pierson is a 77 y.o. female.    Chief Complaint: Lesion    Patient referred to Interventional Radiology by SALAS Davis MD for evaluation of a liver mass. History of endometrial cancer which was completely excised and 2016. She recently developed hematochezia and workup demonstrated a large necrotic pelvic tumor with intrusion into the rectum. Prior biopsies notable for necrotic inflammatory tissue, although high concern for malignancy based on imaging. Repeat CT scan was performed on 6/27/2022 and noted "Multiple ill-defined hypodensities throughout the liver, many of which are too small to characterize.  Larger somewhat heterogeneous hypoattenuating area within the hepatic dome measuring approximately 2.0 cm." Patient continues to have complaints of incontinence and lower extremity edema. She is mostly wheelchair bound.    Review of Systems   Constitutional: Positive for activity change (decreased x2 months), appetite change (x2 months), chills, fatigue and fever.   Respiratory: Negative for cough, shortness of breath, wheezing and stridor.    Cardiovascular: Positive for leg swelling. Negative for chest pain and palpitations.   Gastrointestinal: Positive for nausea (alleviated with eating). Negative for abdominal distention, abdominal pain, constipation, diarrhea and vomiting.         Objective:      Physical Exam  Vitals reviewed.   Constitutional:       General: She is not in acute distress.     Appearance: She is well-developed. She is not diaphoretic.   Cardiovascular:      Rate and Rhythm: Normal rate and regular rhythm.      Heart sounds: Normal heart sounds. No murmur heard.    No friction rub. No gallop.   Pulmonary:      Effort: Pulmonary effort is normal. No respiratory distress.      Breath sounds: Normal breath sounds. No stridor. No wheezing or rales.   Abdominal:      General: Bowel sounds are normal. There is no distension.      Palpations: Abdomen is " soft. There is no mass.      Tenderness: There is no abdominal tenderness. There is no guarding.   Skin:     General: Skin is warm and dry.   Neurological:      Mental Status: She is alert and oriented to person, place, and time.       CT 6/27/2022    Assessment:       Problem List Items Addressed This Visit    None     Visit Diagnoses     Pelvic mass        Liver lesion              Plan:         Discussed case with Dr. Owen. Explained to patient can offer biopsy of liver lesion. Discussed how the procedure will be performed, risks (including, but not limited to, pain, bleeding, infection, damage to nearby structures, and the need for additional procedures), benefits, possible complications, pre-post procedure expectations, and alternatives. The patient voices understanding and all questions have been answered. Patient will be scheduled with anesthesia due to her history of  Sleep apnea. The patient agrees to proceed as planned. Patient scheduled for 8/15/2022. Pre-procedure handout with clinic phone number provided. Patient will have pre-procedure labs drawn today.

## 2022-08-10 ENCOUNTER — TELEPHONE (OUTPATIENT)
Dept: SURGERY | Facility: CLINIC | Age: 77
End: 2022-08-10
Payer: MEDICARE

## 2022-08-10 ENCOUNTER — NURSE TRIAGE (OUTPATIENT)
Dept: ADMINISTRATIVE | Facility: CLINIC | Age: 77
End: 2022-08-10
Payer: MEDICARE

## 2022-08-10 NOTE — TELEPHONE ENCOUNTER
Reason for Disposition   Shock suspected (e.g., cold/pale/clammy skin, too weak to stand, low BP, rapid pulse)    Protocols used: URINARY CATHETER SYMPTOMS AND ICCATONPW-B-PM    Pt's daughter in law Facundo is trying to reach Dr. Davis. She stated the pt is extremely weak and worse from when she left the hospital. Stated the pt has a crump catheter with blood and mucus in it. Pt is too weak to stand. Advised per triage protocol to call 911 for EMS. Caller verbalized understanding.

## 2022-08-10 NOTE — TELEPHONE ENCOUNTER
Spoke with family member at house who states that patient is on her way to the ER now and that the ambulance just picked her up.    Spoke with Muriel regarding mother going to ER for worsening condition.

## 2022-08-10 NOTE — TELEPHONE ENCOUNTER
----- Message from Jacquelyn Dumas sent at 8/10/2022  2:22 PM CDT -----  Type:  Sooner Apoointment Request    Caller is requesting a sooner appointment.  Caller declined first available appointment listed below.  Caller will not accept being placed on the waitlist and is requesting a message be sent to doctor.  Name of Caller: pt   When is the first available appointment?  Symptoms:catheter is red with blood, snot , cant stand, defecates everywhere doesn't know shes doing it, stomach is hurting bad. Pt may be having problems with kidneys again.  Would the patient rather a call back or a response via [facility]? Call back   Best Call Back Number:[phone]  Additional Information: pt needs to speak with someone and needs appt asap.

## 2022-08-11 ENCOUNTER — HOSPITAL ENCOUNTER (INPATIENT)
Facility: HOSPITAL | Age: 77
LOS: 9 days | Discharge: HOSPICE | DRG: 843 | End: 2022-08-20
Attending: COLON & RECTAL SURGERY | Admitting: COLON & RECTAL SURGERY
Payer: MEDICARE

## 2022-08-11 DIAGNOSIS — K63.0 ABSCESS OF SIGMOID COLON: ICD-10-CM

## 2022-08-11 DIAGNOSIS — R19.00 PELVIC MASS: Primary | ICD-10-CM

## 2022-08-11 DIAGNOSIS — L30.8 DERMATITIS ASSOCIATED WITH MOISTURE: ICD-10-CM

## 2022-08-11 DIAGNOSIS — T14.8XXA BLOOD BLISTER: ICD-10-CM

## 2022-08-11 DIAGNOSIS — I82.409 DVT (DEEP VENOUS THROMBOSIS): ICD-10-CM

## 2022-08-11 DIAGNOSIS — R00.0 TACHYCARDIA: ICD-10-CM

## 2022-08-11 PROBLEM — N13.30 HYDRONEPHROSIS: Status: ACTIVE | Noted: 2022-08-11

## 2022-08-11 LAB
ALBUMIN SERPL BCP-MCNC: 1.4 G/DL (ref 3.5–5.2)
ALP SERPL-CCNC: 235 U/L (ref 55–135)
ALT SERPL W/O P-5'-P-CCNC: 9 U/L (ref 10–44)
ANION GAP SERPL CALC-SCNC: 12 MMOL/L (ref 8–16)
ANISOCYTOSIS BLD QL SMEAR: SLIGHT
ANISOCYTOSIS BLD QL SMEAR: SLIGHT
APTT BLDCRRT: 33.7 SEC (ref 21–32)
APTT BLDCRRT: >150 SEC (ref 21–32)
ASCENDING AORTA: 3.41 CM
AST SERPL-CCNC: 15 U/L (ref 10–40)
AV INDEX (PROSTH): 0.78
AV MEAN GRADIENT: 3 MMHG
AV PEAK GRADIENT: 6 MMHG
AV VALVE AREA: 2.35 CM2
AV VELOCITY RATIO: 0.71
BASOPHILS # BLD AUTO: 0.06 K/UL (ref 0–0.2)
BASOPHILS # BLD AUTO: 0.06 K/UL (ref 0–0.2)
BASOPHILS NFR BLD: 0.2 % (ref 0–1.9)
BASOPHILS NFR BLD: 0.2 % (ref 0–1.9)
BILIRUB SERPL-MCNC: 0.3 MG/DL (ref 0.1–1)
BSA FOR ECHO PROCEDURE: 2.62 M2
BUN SERPL-MCNC: 57 MG/DL (ref 8–23)
BURR CELLS BLD QL SMEAR: ABNORMAL
BURR CELLS BLD QL SMEAR: ABNORMAL
C DIFF GDH STL QL: NEGATIVE
C DIFF TOX A+B STL QL IA: NEGATIVE
CALCIUM SERPL-MCNC: 8.1 MG/DL (ref 8.7–10.5)
CHLORIDE SERPL-SCNC: 104 MMOL/L (ref 95–110)
CO2 SERPL-SCNC: 16 MMOL/L (ref 23–29)
CREAT SERPL-MCNC: 0.9 MG/DL (ref 0.5–1.4)
CRP SERPL-MCNC: 276.9 MG/L (ref 0–8.2)
CV ECHO LV RWT: 0.57 CM
DIFFERENTIAL METHOD: ABNORMAL
DIFFERENTIAL METHOD: ABNORMAL
DOP CALC AO PEAK VEL: 1.18 M/S
DOP CALC AO VTI: 21.55 CM
DOP CALC LVOT AREA: 3 CM2
DOP CALC LVOT DIAMETER: 1.96 CM
DOP CALC LVOT PEAK VEL: 0.84 M/S
DOP CALC LVOT STROKE VOLUME: 50.57 CM3
DOP CALCLVOT PEAK VEL VTI: 16.77 CM
ECHO LV POSTERIOR WALL: 0.92 CM (ref 0.6–1.1)
EJECTION FRACTION: 55 %
EOSINOPHIL # BLD AUTO: 0 K/UL (ref 0–0.5)
EOSINOPHIL # BLD AUTO: 0 K/UL (ref 0–0.5)
EOSINOPHIL NFR BLD: 0 % (ref 0–8)
EOSINOPHIL NFR BLD: 0 % (ref 0–8)
ERYTHROCYTE [DISTWIDTH] IN BLOOD BY AUTOMATED COUNT: 19.5 % (ref 11.5–14.5)
ERYTHROCYTE [DISTWIDTH] IN BLOOD BY AUTOMATED COUNT: 19.5 % (ref 11.5–14.5)
EST. GFR  (NO RACE VARIABLE): >60 ML/MIN/1.73 M^2
FRACTIONAL SHORTENING: 24 % (ref 28–44)
GLUCOSE SERPL-MCNC: 108 MG/DL (ref 70–110)
HCT VFR BLD AUTO: 31 % (ref 37–48.5)
HCT VFR BLD AUTO: 31 % (ref 37–48.5)
HGB BLD-MCNC: 9.7 G/DL (ref 12–16)
HGB BLD-MCNC: 9.7 G/DL (ref 12–16)
HYPOCHROMIA BLD QL SMEAR: ABNORMAL
HYPOCHROMIA BLD QL SMEAR: ABNORMAL
IMM GRANULOCYTES # BLD AUTO: 0.28 K/UL (ref 0–0.04)
IMM GRANULOCYTES # BLD AUTO: 0.28 K/UL (ref 0–0.04)
IMM GRANULOCYTES NFR BLD AUTO: 1 % (ref 0–0.5)
IMM GRANULOCYTES NFR BLD AUTO: 1 % (ref 0–0.5)
INR PPP: 1.1 (ref 0.8–1.2)
INR PPP: 9.5 (ref 0.8–1.2)
INR PPP: 9.5 (ref 0.8–1.2)
INTERVENTRICULAR SEPTUM: 1.05 CM (ref 0.6–1.1)
LA MAJOR: 4.18 CM
LA MINOR: 6 CM
LA WIDTH: 2.94 CM
LACTATE SERPL-SCNC: 2 MMOL/L (ref 0.5–2.2)
LEFT ATRIUM SIZE: 3.92 CM
LEFT ATRIUM VOLUME INDEX: 19.7 ML/M2
LEFT ATRIUM VOLUME: 48.27 CM3
LEFT INTERNAL DIMENSION IN SYSTOLE: 2.43 CM (ref 2.1–4)
LEFT VENTRICLE DIASTOLIC VOLUME INDEX: 16.79 ML/M2
LEFT VENTRICLE DIASTOLIC VOLUME: 41.13 ML
LEFT VENTRICLE MASS INDEX: 36 G/M2
LEFT VENTRICLE SYSTOLIC VOLUME INDEX: 8.5 ML/M2
LEFT VENTRICLE SYSTOLIC VOLUME: 20.85 ML
LEFT VENTRICULAR INTERNAL DIMENSION IN DIASTOLE: 3.21 CM (ref 3.5–6)
LEFT VENTRICULAR MASS: 88.72 G
LYMPHOCYTES # BLD AUTO: 1.9 K/UL (ref 1–4.8)
LYMPHOCYTES # BLD AUTO: 1.9 K/UL (ref 1–4.8)
LYMPHOCYTES NFR BLD: 6.7 % (ref 18–48)
LYMPHOCYTES NFR BLD: 6.7 % (ref 18–48)
MCH RBC QN AUTO: 27.2 PG (ref 27–31)
MCH RBC QN AUTO: 27.2 PG (ref 27–31)
MCHC RBC AUTO-ENTMCNC: 31.3 G/DL (ref 32–36)
MCHC RBC AUTO-ENTMCNC: 31.3 G/DL (ref 32–36)
MCV RBC AUTO: 87 FL (ref 82–98)
MCV RBC AUTO: 87 FL (ref 82–98)
MONOCYTES # BLD AUTO: 1.3 K/UL (ref 0.3–1)
MONOCYTES # BLD AUTO: 1.3 K/UL (ref 0.3–1)
MONOCYTES NFR BLD: 4.4 % (ref 4–15)
MONOCYTES NFR BLD: 4.4 % (ref 4–15)
NEUTROPHILS # BLD AUTO: 25.5 K/UL (ref 1.8–7.7)
NEUTROPHILS # BLD AUTO: 25.5 K/UL (ref 1.8–7.7)
NEUTROPHILS NFR BLD: 87.7 % (ref 38–73)
NEUTROPHILS NFR BLD: 87.7 % (ref 38–73)
NRBC BLD-RTO: 0 /100 WBC
NRBC BLD-RTO: 0 /100 WBC
PLATELET # BLD AUTO: 335 K/UL (ref 150–450)
PLATELET # BLD AUTO: 335 K/UL (ref 150–450)
PLATELET BLD QL SMEAR: ABNORMAL
PLATELET BLD QL SMEAR: ABNORMAL
PMV BLD AUTO: 9.5 FL (ref 9.2–12.9)
PMV BLD AUTO: 9.5 FL (ref 9.2–12.9)
POCT GLUCOSE: 136 MG/DL (ref 70–110)
POIKILOCYTOSIS BLD QL SMEAR: SLIGHT
POIKILOCYTOSIS BLD QL SMEAR: SLIGHT
POTASSIUM SERPL-SCNC: 3.9 MMOL/L (ref 3.5–5.1)
PREALB SERPL-MCNC: 8 MG/DL (ref 20–43)
PROT SERPL-MCNC: 5 G/DL (ref 6–8.4)
PROTHROMBIN TIME: 11 SEC (ref 9–12.5)
PROTHROMBIN TIME: 87.5 SEC (ref 9–12.5)
PROTHROMBIN TIME: 87.5 SEC (ref 9–12.5)
RA MAJOR: 4.91 CM
RA PRESSURE: 3 MMHG
RA WIDTH: 2.51 CM
RBC # BLD AUTO: 3.56 M/UL (ref 4–5.4)
RBC # BLD AUTO: 3.56 M/UL (ref 4–5.4)
RIGHT VENTRICULAR END-DIASTOLIC DIMENSION: 2.32 CM
SINUS: 3.34 CM
SODIUM SERPL-SCNC: 132 MMOL/L (ref 136–145)
STJ: 3.27 CM
TDI LATERAL: 0.08 M/S
TDI SEPTAL: 0.09 M/S
TDI: 0.09 M/S
TRICUSPID ANNULAR PLANE SYSTOLIC EXCURSION: 1.44 CM
WBC # BLD AUTO: 29.05 K/UL (ref 3.9–12.7)
WBC # BLD AUTO: 29.05 K/UL (ref 3.9–12.7)

## 2022-08-11 PROCEDURE — 99223 PR INITIAL HOSPITAL CARE,LEVL III: ICD-10-PCS | Mod: ,,, | Performed by: OBSTETRICS & GYNECOLOGY

## 2022-08-11 PROCEDURE — 99223 1ST HOSP IP/OBS HIGH 75: CPT | Mod: ,,, | Performed by: COLON & RECTAL SURGERY

## 2022-08-11 PROCEDURE — 84134 ASSAY OF PREALBUMIN: CPT | Performed by: STUDENT IN AN ORGANIZED HEALTH CARE EDUCATION/TRAINING PROGRAM

## 2022-08-11 PROCEDURE — 86140 C-REACTIVE PROTEIN: CPT

## 2022-08-11 PROCEDURE — 99223 PR INITIAL HOSPITAL CARE,LEVL III: ICD-10-PCS | Mod: ,,, | Performed by: COLON & RECTAL SURGERY

## 2022-08-11 PROCEDURE — 85025 COMPLETE CBC W/AUTO DIFF WBC: CPT

## 2022-08-11 PROCEDURE — 80053 COMPREHEN METABOLIC PANEL: CPT

## 2022-08-11 PROCEDURE — 36573 INSJ PICC RS&I 5 YR+: CPT

## 2022-08-11 PROCEDURE — 83605 ASSAY OF LACTIC ACID: CPT

## 2022-08-11 PROCEDURE — 85610 PROTHROMBIN TIME: CPT

## 2022-08-11 PROCEDURE — 25000003 PHARM REV CODE 250: Performed by: STUDENT IN AN ORGANIZED HEALTH CARE EDUCATION/TRAINING PROGRAM

## 2022-08-11 PROCEDURE — 85730 THROMBOPLASTIN TIME PARTIAL: CPT

## 2022-08-11 PROCEDURE — 25000003 PHARM REV CODE 250: Performed by: COLON & RECTAL SURGERY

## 2022-08-11 PROCEDURE — 85730 THROMBOPLASTIN TIME PARTIAL: CPT | Mod: 91 | Performed by: COLON & RECTAL SURGERY

## 2022-08-11 PROCEDURE — 36415 COLL VENOUS BLD VENIPUNCTURE: CPT

## 2022-08-11 PROCEDURE — 87449 NOS EACH ORGANISM AG IA: CPT

## 2022-08-11 PROCEDURE — 63600175 PHARM REV CODE 636 W HCPCS

## 2022-08-11 PROCEDURE — 20600001 HC STEP DOWN PRIVATE ROOM

## 2022-08-11 PROCEDURE — 99223 1ST HOSP IP/OBS HIGH 75: CPT | Mod: ,,, | Performed by: OBSTETRICS & GYNECOLOGY

## 2022-08-11 PROCEDURE — 36415 COLL VENOUS BLD VENIPUNCTURE: CPT | Performed by: STUDENT IN AN ORGANIZED HEALTH CARE EDUCATION/TRAINING PROGRAM

## 2022-08-11 PROCEDURE — 25000003 PHARM REV CODE 250

## 2022-08-11 PROCEDURE — B4185 PARENTERAL SOL 10 GM LIPIDS: HCPCS | Performed by: STUDENT IN AN ORGANIZED HEALTH CARE EDUCATION/TRAINING PROGRAM

## 2022-08-11 PROCEDURE — 63600175 PHARM REV CODE 636 W HCPCS: Performed by: STUDENT IN AN ORGANIZED HEALTH CARE EDUCATION/TRAINING PROGRAM

## 2022-08-11 PROCEDURE — A4217 STERILE WATER/SALINE, 500 ML: HCPCS | Performed by: COLON & RECTAL SURGERY

## 2022-08-11 PROCEDURE — 76937 US GUIDE VASCULAR ACCESS: CPT

## 2022-08-11 PROCEDURE — C1751 CATH, INF, PER/CENT/MIDLINE: HCPCS

## 2022-08-11 PROCEDURE — 85730 THROMBOPLASTIN TIME PARTIAL: CPT | Mod: 91 | Performed by: STUDENT IN AN ORGANIZED HEALTH CARE EDUCATION/TRAINING PROGRAM

## 2022-08-11 PROCEDURE — 85610 PROTHROMBIN TIME: CPT | Mod: 91 | Performed by: STUDENT IN AN ORGANIZED HEALTH CARE EDUCATION/TRAINING PROGRAM

## 2022-08-11 PROCEDURE — 27000207 HC ISOLATION

## 2022-08-11 PROCEDURE — 63600175 PHARM REV CODE 636 W HCPCS: Performed by: COLON & RECTAL SURGERY

## 2022-08-11 PROCEDURE — 36415 COLL VENOUS BLD VENIPUNCTURE: CPT | Performed by: COLON & RECTAL SURGERY

## 2022-08-11 RX ORDER — OXYCODONE HYDROCHLORIDE 5 MG/1
5 TABLET ORAL EVERY 4 HOURS PRN
Status: DISCONTINUED | OUTPATIENT
Start: 2022-08-11 | End: 2022-08-13

## 2022-08-11 RX ORDER — SODIUM CHLORIDE 0.9 % (FLUSH) 0.9 %
10 SYRINGE (ML) INJECTION
Status: DISCONTINUED | OUTPATIENT
Start: 2022-08-11 | End: 2022-08-20 | Stop reason: HOSPADM

## 2022-08-11 RX ORDER — GLUCAGON 1 MG
1 KIT INJECTION
Status: DISCONTINUED | OUTPATIENT
Start: 2022-08-11 | End: 2022-08-20 | Stop reason: HOSPADM

## 2022-08-11 RX ORDER — SODIUM CHLORIDE 0.9 % (FLUSH) 0.9 %
10 SYRINGE (ML) INJECTION EVERY 6 HOURS
Status: DISCONTINUED | OUTPATIENT
Start: 2022-08-11 | End: 2022-08-20 | Stop reason: HOSPADM

## 2022-08-11 RX ORDER — DEXTROSE MONOHYDRATE 100 MG/ML
INJECTION, SOLUTION INTRAVENOUS CONTINUOUS PRN
Status: DISCONTINUED | OUTPATIENT
Start: 2022-08-11 | End: 2022-08-20 | Stop reason: HOSPADM

## 2022-08-11 RX ORDER — ACETAMINOPHEN 325 MG/1
650 TABLET ORAL EVERY 8 HOURS PRN
Status: DISCONTINUED | OUTPATIENT
Start: 2022-08-11 | End: 2022-08-13

## 2022-08-11 RX ORDER — ASPIRIN 81 MG/1
81 TABLET ORAL DAILY
Status: DISCONTINUED | OUTPATIENT
Start: 2022-08-11 | End: 2022-08-20 | Stop reason: HOSPADM

## 2022-08-11 RX ORDER — HEPARIN SODIUM,PORCINE/D5W 25000/250
0-40 INTRAVENOUS SOLUTION INTRAVENOUS CONTINUOUS
Status: DISCONTINUED | OUTPATIENT
Start: 2022-08-11 | End: 2022-08-12

## 2022-08-11 RX ORDER — DEXTROSE MONOHYDRATE AND SODIUM CHLORIDE 5; .9 G/100ML; G/100ML
INJECTION, SOLUTION INTRAVENOUS CONTINUOUS
Status: DISCONTINUED | OUTPATIENT
Start: 2022-08-11 | End: 2022-08-20 | Stop reason: HOSPADM

## 2022-08-11 RX ORDER — HYDROCODONE BITARTRATE AND ACETAMINOPHEN 7.5; 325 MG/1; MG/1
1 TABLET ORAL EVERY 6 HOURS PRN
Status: DISCONTINUED | OUTPATIENT
Start: 2022-08-11 | End: 2022-08-11

## 2022-08-11 RX ORDER — FLUCONAZOLE 2 MG/ML
200 INJECTION, SOLUTION INTRAVENOUS
Status: DISCONTINUED | OUTPATIENT
Start: 2022-08-11 | End: 2022-08-11

## 2022-08-11 RX ORDER — ONDANSETRON 8 MG/1
8 TABLET, ORALLY DISINTEGRATING ORAL EVERY 8 HOURS PRN
Status: DISCONTINUED | OUTPATIENT
Start: 2022-08-11 | End: 2022-08-20 | Stop reason: HOSPADM

## 2022-08-11 RX ORDER — TALC
6 POWDER (GRAM) TOPICAL NIGHTLY PRN
Status: DISCONTINUED | OUTPATIENT
Start: 2022-08-11 | End: 2022-08-20 | Stop reason: HOSPADM

## 2022-08-11 RX ORDER — DEXTROSE MONOHYDRATE, SODIUM CHLORIDE, AND POTASSIUM CHLORIDE 50; 1.49; 4.5 G/1000ML; G/1000ML; G/1000ML
INJECTION, SOLUTION INTRAVENOUS CONTINUOUS
Status: DISCONTINUED | OUTPATIENT
Start: 2022-08-11 | End: 2022-08-11

## 2022-08-11 RX ORDER — METOPROLOL SUCCINATE 25 MG/1
25 TABLET, EXTENDED RELEASE ORAL DAILY
Status: DISCONTINUED | OUTPATIENT
Start: 2022-08-11 | End: 2022-08-18

## 2022-08-11 RX ORDER — NALOXONE HCL 0.4 MG/ML
0.02 VIAL (ML) INJECTION
Status: DISCONTINUED | OUTPATIENT
Start: 2022-08-11 | End: 2022-08-20 | Stop reason: HOSPADM

## 2022-08-11 RX ORDER — LIDOCAINE HYDROCHLORIDE 10 MG/ML
1 INJECTION, SOLUTION EPIDURAL; INFILTRATION; INTRACAUDAL; PERINEURAL ONCE
Status: COMPLETED | OUTPATIENT
Start: 2022-08-11 | End: 2022-08-11

## 2022-08-11 RX ADMIN — PIPERACILLIN SODIUM AND TAZOBACTAM SODIUM 4.5 G: 4; .5 INJECTION, POWDER, LYOPHILIZED, FOR SOLUTION INTRAVENOUS at 10:08

## 2022-08-11 RX ADMIN — PIPERACILLIN SODIUM AND TAZOBACTAM SODIUM 4.5 G: 4; .5 INJECTION, POWDER, LYOPHILIZED, FOR SOLUTION INTRAVENOUS at 04:08

## 2022-08-11 RX ADMIN — ASPIRIN 81 MG: 81 TABLET, COATED ORAL at 04:08

## 2022-08-11 RX ADMIN — LIDOCAINE HYDROCHLORIDE 10 MG: 10 INJECTION, SOLUTION EPIDURAL; INFILTRATION; INTRACAUDAL at 05:08

## 2022-08-11 RX ADMIN — DEXTROSE AND SODIUM CHLORIDE: 5; .9 INJECTION, SOLUTION INTRAVENOUS at 09:08

## 2022-08-11 RX ADMIN — MAGNESIUM SULFATE HEPTAHYDRATE: 500 INJECTION, SOLUTION INTRAMUSCULAR; INTRAVENOUS at 10:08

## 2022-08-11 RX ADMIN — I.V. FAT EMULSION 250 ML: 20 EMULSION INTRAVENOUS at 10:08

## 2022-08-11 RX ADMIN — PIPERACILLIN SODIUM AND TAZOBACTAM SODIUM 4.5 G: 4; .5 INJECTION, POWDER, LYOPHILIZED, FOR SOLUTION INTRAVENOUS at 07:08

## 2022-08-11 RX ADMIN — FLUCONAZOLE IN SODIUM CHLORIDE 200 MG: 2 INJECTION, SOLUTION INTRAVENOUS at 05:08

## 2022-08-11 RX ADMIN — METOPROLOL SUCCINATE 25 MG: 25 TABLET, EXTENDED RELEASE ORAL at 09:08

## 2022-08-11 RX ADMIN — HEPARIN SODIUM 18 UNITS/KG/HR: 5000 INJECTION INTRAVENOUS; SUBCUTANEOUS at 06:08

## 2022-08-11 NOTE — SUBJECTIVE & OBJECTIVE
Oncology Treatment Plan:   [Could not find a treatment plan. This SmartLink may be configured incorrectly. Contact a  for help.]    Oncology History:     9/2016: s/p RA-TLH/BSO for Stage IAI Endometrial cancer at [facility]    5/9/2022 CT AP: 1. An 8.5 x 7.5 cm mass within the pelvis is noted and hemorrhage into  an abscess is suspected.  2. Large ventral abdominal wall hernia containing bowel loops is noted.  3. Postoperative changes     5/10/2022 MR Pelvis:  1. There is circumferential mural thickening and hyperenhancement of  the upper rectum as it abuts a 7.8 cm mass within the pelvis. No obvious malignancy can be documented.The bulk of this lesion appears to lie above the peritoneal reflection  2. Large ventral abdominal wall hernia     5/12/2022 Vaginal Cuff Biopsy:  -Fibroadipose tissue with ulcer and granulation tissue.  -Negative for carcinoma.  Rectal mass, biopsy:  -Necroinflammatory debris (see comment).   -No mucosa present for evaluation.  -Negative for carcinoma.    6/2/2022 CT A/P  1. Large, round heterogeneous soft tissue mass centered within the expected region of the vaginal cuff and likely distending the vagina.  Difficult to delineate fat planes, but appears to compress or directly invade the rectum.  Primary rectal neoplasm thought less likely given patient history.  Recommend correlation with previous sigmoidoscopy.  Additional mass effect on the bladder without definite invasion.  No suspicious lymphadenopathy.  2. Multiple ill-defined hypodensities in the liver.  Recommend further evaluation with contrast-enhanced MRI abdomen in order to exclude metastasis.  3. Bilateral hydronephrosis with distended urinary bladder, likely secondary to outlet obstruction from large pelvic mass.    IR guided liver biopsy scheduled, however not completed due to recent hospitalization.        Past Medical History:   Diagnosis Date    Anemia     Aortic stenosis      Hypertension     Paroxysmal atrial fibrillation     Sleep apnea     Uterine cancer      Past Surgical History:   Procedure Laterality Date    APPENDECTOMY  1991    [employer], [place], [place]    BILATERAL SALPINGO-OOPHORECTOMY (BSO) Bilateral 2016    [place], [place]     SECTION  1967    [facility], [place], [place]    GALLBLADDER SURGERY      [facility], [place], [place]    GASTRIC BYPASS  1988    [facility] [place]    HERNIA REPAIR  1988    Removal of excess fat ([facility], [place])    HERNIA REPAIR  2005    [employer] ([place], [place])    HERNIA REPAIR  2018    [facility], [facility], [place]    HYSTERECTOMY Bilateral 2016    [place], [place]    KNEE SURGERY Right 2004    [employer], ([place], [place])    KNEE SURGERY Left 2013    [employer], ([place])     Family History    None       Tobacco Use    Smoking status: Never Smoker    Smokeless tobacco: Never Used   Substance and Sexual Activity    Alcohol use: Not Currently    Drug use: Not Currently    Sexual activity: Not Currently       PTA Medications   Medication Sig    aspirin (ECOTRIN) 81 MG EC tablet Take 81 mg by mouth Daily.    calcium-vitamin D 600 mg-10 mcg (400 unit) Tab Take 1 tablet by mouth once daily.    cholecalciferol, vitamin D3, 125 mcg (5,000 unit) Tab Take 5,000 Units by mouth Daily.    cyanocobalamin (VITAMIN B-12) 1000 MCG tablet Take 1,000 mcg by mouth Daily.    ferrous sulfate (FEOSOL) 325 mg (65 mg iron) Tab tablet Take 325 mg by mouth Daily.    metoprolol succinate (TOPROL-XL) 25 MG 24 hr tablet Take 1 tablet by mouth once daily.    oxybutynin (DITROPAN-XL) 10 MG 24 hr tablet Take 1 tablet by mouth once daily.       Review of patient's allergies indicates:  No Known Allergies    Review of Systems   Constitutional:  Negative for fever.   Respiratory:  Negative for shortness of breath.    Cardiovascular:  Negative for chest pain.   Gastrointestinal:  Positive for fecal incontinence. Negative for  abdominal pain, nausea and vomiting.   Endocrine: Negative for hot flashes.   Genitourinary:  Positive for vaginal bleeding.   Integumentary:  Negative for breast mass, nipple discharge and breast skin changes.   Neurological:  Negative for headaches.   Hematological:  Does not bruise/bleed easily.   Psychiatric/Behavioral:  Negative for depression.    Breast: Negative for mass, mastodynia, nipple discharge and skin changes   Objective:     Vital Signs (Most Recent):  Temp: 97.5 °F (36.4 °C) (08/11/22 0809)  Pulse: 94 (08/11/22 0809)  Resp: 16 (08/11/22 0809)  BP: 114/66 (08/11/22 0809)  SpO2: 95 % (08/11/22 0809) Vital Signs (24h Range):  Temp:  [97.4 °F (36.3 °C)-98.4 °F (36.9 °C)] 97.5 °F (36.4 °C)  Pulse:  [] 94  Resp:  [13-18] 16  SpO2:  [92 %-95 %] 95 %  BP: (107-114)/(56-66) 114/66     Weight: (!) 149.7 kg (330 lb)  Body mass index is 54.91 kg/m².    Physical Exam:   Constitutional:   Deconditioned, morbidly obese             Abdominal: Soft.   Multiple reducible small hernias in abdomen. No tenderness to palpation. No distinct mass palpated however difficult secondary to body habitus. Skin with scaling     Genitourinary:    Genitourinary Comments: Stool noted when attempted to evaluate vagina. Dickson catheter in place with velasquez colored urine             Musculoskeletal:      Comments: Bed bound, unable to move legs for vaginal exam        Skin: Skin is warm and dry.   Scaling and flaking of skin, multiple bruises in BUL    Psychiatric: She has a normal mood and affect. Her behavior is normal.     Laboratory:  Recent Lab Results  (Last 5 results in the past 24 hours)        08/11/22  0806   08/11/22  0805   08/11/22  0603   08/11/22  0601   08/10/22  1815        Albumin       1.4         Alkaline Phosphatase       235         ALT       9         Anion Gap       12         Aniso     Slight                Slight           aPTT >150.0  Comment: aPTT therapeutic range = 39-69 seconds  PTT critical result(s)  called and confirmed and verbal readback   obtained from Desiree Cruz RN hep off at 7 by [facility]1 08/11/2022   08:51         >150.0  Comment: aPTT therapeutic range = 39-69 seconds  INR APTT   critical result(s) called and verbal readback obtained   from ELVIN GARCIA RN  by [facility] 08/11/2022 07:06                  >150.0  Comment: aPTT therapeutic range = 39-69 seconds  INR APTT   critical result(s) called and verbal readback obtained   from ELVIN GARCIA RN  by [facility] 08/11/2022 07:06           AST       15         Base Excess, Arterial         -6.2       Baso #     0.06                0.06           Basophil %     0.2                0.2           BILIRUBIN TOTAL       0.3  Comment: For infants and newborns, interpretation of results should be based  on gestational age, weight and in agreement with clinical  observations.    Premature Infant recommended reference ranges:  Up to 24 hours.............<8.0 mg/dL  Up to 48 hours............<12.0 mg/dL  3-5 days..................<15.0 mg/dL  6-29 days.................<15.0 mg/dL           BUN       57         Frances/Echinocytes     Occasional                Occasional           Calcium       8.1         Chloride       104         CO2       16         Creatinine       0.9         CRP       276.9         Differential Method     Automated                Automated           eGFR       >60.0         Eos #     0.0                0.0           Eosinophil %     0.0                0.0           FiO2         21.0       Glucose       108         Gran # (ANC)     25.5                25.5           Gran %     87.7                87.7           HCO3 ART         16.8       Hematocrit     31.0                31.0           Hemoglobin     9.7                9.7           Hypo     Occasional                Occasional           Immature Grans (Abs)     0.28  Comment: Mild elevation in immature granulocytes is non specific and   can be seen in a variety of conditions including stress response,    acute inflammation, trauma and pregnancy. Correlation with other   laboratory and clinical findings is essential.                  0.28  Comment: Mild elevation in immature granulocytes is non specific and   can be seen in a variety of conditions including stress response,   acute inflammation, trauma and pregnancy. Correlation with other   laboratory and clinical findings is essential.             Immature Granulocytes     1.0                1.0           INR 1.1  Comment: Coumadin Therapy:  2.0 - 3.0 for INR for all indicators except mechanical heart valves  and antiphospholipid syndromes which should use 2.5 - 3.5.         9.5  Comment: Coumadin Therapy:  2.0 - 3.0 for INR for all indicators except mechanical heart valves  and antiphospholipid syndromes which should use 2.5 - 3.5.  INR APTT   critical result(s) called and verbal readback obtained   from ELVIN GARCIA RN  by [facility] 08/11/2022 07:06                  9.5  Comment: Coumadin Therapy:  2.0 - 3.0 for INR for all indicators except mechanical heart valves  and antiphospholipid syndromes which should use 2.5 - 3.5.  INR APTT   critical result(s) called and verbal readback obtained   from ELVIN GARCIA RN  by [facility] 08/11/2022 07:06           Lactate, Nael       2.0  Comment: Falsely low lactic acid results can be found in samples   containing >=13.0 mg/dL total bilirubin and/or >=3.5 mg/dL   direct bilirubin.           Lymph #     1.9                1.9           Lymph %     6.7                6.7           MCH     27.2                27.2           MCHC     31.3                31.3           MCV     87                87           Mono #     1.3                1.3           Mono %     4.4                4.4           MPV     9.5                9.5           nRBC     0                0           O2 Sat, Arterial         95.9       pCO2, Arterial         27.2       pH, Arterial         7.41       Platelet Estimate     Appears normal                Appears  normal           Platelets     335                335           pO2, Arterial         79       Poikilocytosis     Slight                Slight           Potassium       3.9         Prealbumin   8             PROTEIN TOTAL       5.0         Protime 11.0       87.5                87.5         RBC     3.56                3.56           RDW     19.5                19.5           Sodium       132         WBC     29.05                29.05

## 2022-08-11 NOTE — ASSESSMENT & PLAN NOTE
- s/p RA-TLH/BSO in 2016 in the setting of Stage IAI Endometrial Cancer  - Pelvic mass of unknown etiology now with concern for invasion into bladder, rectum, and possibly vagina  - Evaluated by staff, Gynecology Oncology available for surgical intervention as planned per primary team  - Agree with medical optimization before surgery attempted due to patient's multiple co morbidities and clinical status  - Thank you for your consult. We will continue to follow along. Please page [phone] with any concerns.

## 2022-08-11 NOTE — HPI
Linda Pierson is a 77 year old female with a history of endometrial cancer, AF (on eliquis), pelvic mass of quetionable origin presently admitted with weakness. Urology was consulted for questionable bladder involvement.    History per patient and chart. Per patient, she had a recent fall secondary to increased weakness. Denies any fevers, chills. Denies nausea, vomiting. Patient with a history of a pelvic mass of unknown origin. Had a prior biopsy mostly necrotic. Her recent imaging is not presently available for review, but per report there has been interval growth of her mass with questionable involvement of the bladder. Patient is a high risk surgical candidate and Dr. Davis requested urologic evaluation to determine extent of resection in order to adequately  patient and family.    On assessment, patient is AF, HDS. Leukocytosis to 29, Cr 0.9 (baseline 0.9) appears to have improved with crump placement at OSH. UA LE positive, nitrite negative. Microscopy with 12 WBC, trace bacteria. Prior CT reviewed which shows bilateral mild hydronephrosis and hydroureter down to the level of a widely distended bladder. There is a large pelvic mass posterior to the bladder with questionable involvement of the bladder. Per CT read from yesterday, interval growth of pelvic mass with new direct extension into rectum and sigmoid colon as well as stable appearing bilateral hydronephrosis.

## 2022-08-11 NOTE — HPI
Linda Pierson is a 77 y.o. with history of Afib, Aortic stenosis, [place], with history of Stage IAG1 endometrial cancer s/p robotic hysterectomy and staging 9/2016 at [facility]. She has large pelvic mass seen on CT in 5/2022. She then had vaginal cuff biopsy performed which showed necrotic tissue negative for malignancy. She has been seen by Dr. Stephan Sharma in clinic regarding pelvic mass and plan was for possible surgical resection with colorectal surgery. Since that time, repeat imaging noted liver masses and plan was for possible IR biopsy of liver mass.     She then presented to OSH for weakness and fall approximately two days ago and was transferred to [facility] to the general surgery team. On arrival, VSS and labs notable for leukocytosis. Repeat imaging at OSH notable for gas containing exophytic mass in the pelvis now with direct luminal continuity with the sigmoid and rectum. Marked distension of the rectum, now [id number].     Gynecology consulted for evaluation given pelvic mass and history of endometrial cancer.     Patient reports continued vaginal vs rectal bleeding at home. She is wheelchair bound and lives with her family who are her primary caretakers. She reports stool and urinary incontinence at baseline. She is able to tolerate PO without N/V, however reports decreased PO intake as of late. She denies fevers/chills, abdominal pain, constipation or dysuria.

## 2022-08-11 NOTE — SUBJECTIVE & OBJECTIVE
Past Medical History:   Diagnosis Date    Anemia     Aortic stenosis     Hypertension     Paroxysmal atrial fibrillation     Sleep apnea     Uterine cancer        Past Surgical History:   Procedure Laterality Date    APPENDECTOMY  1991    [employer], [place], [place]    BILATERAL SALPINGO-OOPHORECTOMY (BSO) Bilateral 2016    [place], [place]     SECTION  1967    [facility], [place], [place]    GALLBLADDER SURGERY      [facility], [place]    GASTRIC BYPASS  1988    [facility] [place]    HERNIA REPAIR  1988    Removal of excess fat ([facility], [place], [place])    HERNIA REPAIR  2005    [employer] ([place])    HERNIA REPAIR  2018    [facility], [place]    HYSTERECTOMY Bilateral 2016    [place]    KNEE SURGERY Right 2004    [employer], ([place], [place])    KNEE SURGERY Left 2013    [employer], ([place])       Review of patient's allergies indicates:  No Known Allergies    Family History    None         Tobacco Use    Smoking status: Never Smoker    Smokeless tobacco: Never Used   Substance and Sexual Activity    Alcohol use: Not Currently    Drug use: Not Currently    Sexual activity: Not Currently       Review of Systems   Constitutional:  Positive for activity change. Negative for chills and fever.   Respiratory:  Negative for shortness of breath.    Cardiovascular:  Negative for chest pain.   Gastrointestinal:  Positive for abdominal pain and diarrhea. Negative for nausea and vomiting.   Genitourinary:  Positive for difficulty urinating. Negative for flank pain and hematuria.   Neurological:  Positive for weakness. Negative for dizziness.     Objective:     Temp:  [97.4 °F (36.3 °C)-98.4 °F (36.9 °C)] 97.5 °F (36.4 °C)  Pulse:  [] 94  Resp:  [13-18] 16  SpO2:  [92 %-95 %] 95 %  BP: (107-114)/(56-66) 114/66     Body mass index is 54.91 kg/m².           Drains       Drain  Duration                  Urethral Catheter 08/10/22 0000  Straight-tip 16 Fr. 1 day                    Physical Exam  Vitals reviewed.   Constitutional:       General: She is not in acute distress.     Appearance: She is well-developed. She is not diaphoretic.   HENT:      Head: Normocephalic and atraumatic.   Eyes:      General: No scleral icterus.  Cardiovascular:      Rate and Rhythm: Normal rate.   Pulmonary:      Effort: Pulmonary effort is normal. No respiratory distress.      Breath sounds: No stridor.   Abdominal:      Palpations: There is mass (Large, fixed abdominal mass from suprapubic area to above umbilicus).      Tenderness: There is no right CVA tenderness or left CVA tenderness.   Genitourinary:     Comments: Dickson catheter draining clear yellow urine  Musculoskeletal:      Cervical back: Normal range of motion.      Right lower leg: Edema present.      Left lower leg: Edema present.   Skin:     General: Skin is warm and dry.   Neurological:      Mental Status: She is alert.   Psychiatric:         Behavior: Behavior normal.       Significant Labs:    BMP:  Recent Labs   Lab 08/05/22  1328 08/10/22  1641 08/11/22  0601   * 131* 132*   K 3.6 3.5 3.9    100 104   CO2 20 20 16*   BUN 59* 53* 57*   CREATININE 1.40* 0.96 0.9   GLUCOSE 140* 112*  --    CALCIUM 7.7* 8.1* 8.1*       CBC:  Recent Labs   Lab 08/05/22  0406 08/10/22  1641 08/11/22  0603   WBC 15.5* 28.7* 29.05*  29.05*   HGB 8.8* 9.8* 9.7*  9.7*   HCT 27.2* 30.9* 31.0*  31.0*    387* 335  335       Blood Culture: No results for input(s): LABBLOO in the last 168 hours.  Urine Culture: No results for input(s): LABURIN in the last 168 hours.  Urine Studies: No results for input(s): COLORU, APPEARANCEUA, PHUR, SPECGRAV, PROTEINUA, GLUCUA, KETONESU, BILIRUBINUA, OCCULTUA, NITRITE, UROBILINOGEN, LEUKOCYTESUR, RBCUA, WBCUA, BACTERIA, SQUAMEPITHEL, HYALINECASTS in the last 168 hours.    Invalid input(s): WRIGHTSUR    Significant Imaging:  Findings as above

## 2022-08-11 NOTE — CONSULTS
[facility]  Urology  Consult Note    Patient Name: Linda Pierson  MRN: [id number]  Admission Date: 8/11/2022  Hospital Length of Stay: 0   Code Status: Full Code   Attending Provider: SALAS Davis MD   Consulting Provider: London Diaz MD  Primary Care Physician: Primary Doctor No  Principal Problem:<principal problem not specified>    Inpatient consult to Urology  Consult performed by: London Diaz MD  Consult ordered by: Ryland Grey MD          Subjective:     HPI:  Linda Pierson is a 77 year old female with a history of endometrial cancer, AF (on eliquis), pelvic mass of quetionable origin presently admitted with weakness. Urology was consulted for questionable bladder involvement.    History per patient and chart. Per patient, she had a recent fall secondary to increased weakness. Denies any fevers, chills. Denies nausea, vomiting. Patient with a history of a pelvic mass of unknown origin. Had a prior biopsy mostly necrotic. Her recent imaging is not presently available for review, but per report there has been interval growth of her mass with questionable involvement of the bladder. Patient is a high risk surgical candidate and Dr. Davis requested urologic evaluation to determine extent of resection in order to adequately  patient and family.    On assessment, patient is AF, HDS. Leukocytosis to 29, Cr 0.9 (baseline 0.9) appears to have improved with crump placement at OSH. UA LE positive, nitrite negative. Microscopy with 12 WBC, trace bacteria. Prior CT reviewed which shows bilateral mild hydronephrosis and hydroureter down to the level of a widely distended bladder. There is a large pelvic mass posterior to the bladder with questionable involvement of the bladder. Per CT read from yesterday, interval growth of pelvic mass with new direct extension into rectum and sigmoid colon as well as stable appearing bilateral hydronephrosis.       Past Medical History:   Diagnosis Date     Anemia     Aortic stenosis     Hypertension     Paroxysmal atrial fibrillation     Sleep apnea     Uterine cancer        Past Surgical History:   Procedure Laterality Date    APPENDECTOM[place]  1991    [employer], [place]    BILATERAL SALPINGO-OOPHORECT[place]) [place]     [facility] [place] [place]    GALLBL[place]G[place]      [facility]    [facility]  [facility]    [facility] [place]    HERNIA REPAIR  1988    [facility] of [facility] ([facility] [place]    HERNIA REPAIR  2005    [employer] ([place], [place])    HERNIA REPAIR  2018    [facility] [place]    [place]    KNEE SURGERY Right 2004    [employer], ([place])    [place] Left [place]    [employer], ([place]       Review of patient's allergies indicates:  No Known Allergies    Family History    None         Tobacco Use    Smoking status: Never Smoker    Smokeless tobacco: Never Used   Substance and Sexual Activity    Alcohol use: Not Currently    Drug use: Not Currently    Sexual activity: Not Currently       Review of Systems   Constitutional:  Positive for activity change. Negative for chills and fever.   Respiratory:  Negative for shortness of breath.    Cardiovascular:  Negative for chest pain.   Gastrointestinal:  Positive for abdominal pain and diarrhea. Negative for nausea and vomiting.   Genitourinary:  Positive for difficulty urinating. Negative for flank pain and hematuria.   Neurological:  Positive for weakness. Negative for dizziness.     Objective:     Temp:  [97.4 °F (36.3 °C)-98.4 °F (36.9 °C)] 97.5 °F (36.4 °C)  Pulse:  [] 94  Resp:  [13-18] 16  SpO2:  [92 %-95 %] 95 %  BP: (107-114)/(56-66) 114/66     Body mass index is 54.91 kg/m².           Drains       Drain  Duration                  Urethral Catheter 08/10/22 0000 Straight-tip 16 Fr. 1 day                     Physical Exam  Vitals reviewed.   Constitutional:       General: She is not in acute distress.     Appearance: She is well-developed. She is not diaphoretic.   HENT:      Head: Normocephalic and atraumatic.   Eyes:      General: No scleral icterus.  Cardiovascular:      Rate and Rhythm: Normal rate.   Pulmonary:      Effort: Pulmonary effort is normal. No respiratory distress.      Breath sounds: No stridor.   Abdominal:      Palpations: There is mass (Large, fixed abdominal mass from suprapubic area to above umbilicus).      Tenderness: There is no right CVA tenderness or left CVA tenderness.   Genitourinary:     Comments: Dickson catheter draining clear yellow urine  Musculoskeletal:      Cervical back: Normal range of motion.      Right lower leg: Edema present.      Left lower leg: Edema present.   Skin:     General: Skin is warm and dry.   Neurological:      Mental Status: She is alert.   Psychiatric:         Behavior: Behavior normal.       Significant Labs:    BMP:  Recent Labs   Lab 08/05/22  1328 08/10/22  1641 08/11/22  0601   * 131* 132*   K 3.6 3.5 3.9    100 104   CO2 20 20 16*   BUN 59* 53* 57*   CREATININE 1.40* 0.96 0.9   GLUCOSE 140* 112*  --    CALCIUM 7.7* 8.1* 8.1*       CBC:  Recent Labs   Lab 08/05/22  0406 08/10/22  1641 08/11/22  0603   WBC 15.5* 28.7* 29.05*  29.05*   HGB 8.8* 9.8* 9.7*  9.7*   HCT 27.2* 30.9* 31.0*  31.0*    387* 335  335       Blood Culture: No results for input(s): LABBLOO in the last 168 hours.  Urine Culture: No results for input(s): LABURIN in the last 168 hours.  Urine Studies: No results for input(s): COLORU, APPEARANCEUA, PHUR, SPECGRAV, PROTEINUA, GLUCUA, KETONESU, BILIRUBINUA, OCCULTUA, NITRITE, UROBILINOGEN, LEUKOCYTESUR, RBCUA, WBCUA, BACTERIA, SQUAMEPITHEL, HYALINECASTS in the last 168 hours.    Invalid input(s): WRIGHTSUR    Significant Imaging:  Findings as above                      Assessment and Plan:      Hydronephrosis  Linda Pierson is a 77 year old female with a history of endometrial cancer, AF (on eliquis), pelvic mass of quetionable origin presently admitted with weakness. Urology was consulted for questionable bladder involvement.  - monitor and record UOP  - Cr improving with catheter placement, maintain crump catheter  - will plan for flexible cystoscopy at bedside to assess for any gross involvement of bladder, concurrent bimanual exam        VTE Risk Mitigation (From admission, onward)         Ordered     IP VTE HIGH RISK PATIENT  Once         08/11/22 0646     heparin 25,000 units in dextrose 5% (100 units/ml) IV bolus from bag - ADDITIONAL PRN BOLUS - 60 units/kg  As needed (PRN)        Question:  Heparin Infusion Adjustment (DO NOT MODIFY ANSWER)  Answer:  \\"[employer]\epic\Images\Pharmacy\HeparinInfusions\heparin HIGH INTENSITY nomogram for [facility] [id number].pdf    08/11/22 0523     heparin 25,000 units in dextrose 5% (100 units/ml) IV bolus from bag - ADDITIONAL PRN BOLUS - 30 units/kg  As needed (PRN)        Question:  Heparin Infusion Adjustment (DO NOT MODIFY ANSWER)  Answer:  \\[email or web address]\epic\Images\Pharmacy\HeparinInfusions\heparin HIGH INTENSITY nomogram for [facility] [id number].pdf    08/11/22 0523     heparin 25,000 units in dextrose 5% 250 mL (100 units/mL) infusion HIGH INTENSITY nomogram - [facility]  Continuous        Question Answer Comment   Heparin Infusion Adjustment (DO NOT MODIFY ANSWER) \\"[employer]\epic\Images\Pharmacy\HeparinInfusions\heparin HIGH INTENSITY nomogram for [facility] [id number].pdf    Begin at (in units/kg/hr) 18        08/11/22 0523     Place sequential compression device  Until discontinued         08/11/22 0358              London Diaz MD  Urology  [facility]

## 2022-08-11 NOTE — CONSULTS
Double lumen PICC to right basilic vein.  37 cm in length, 38 cm arm circumference and 0 cm exposed.   Lot # [id number].

## 2022-08-11 NOTE — CONSULTS
[facility]  Gynecologic Oncology  Consult Note    Patient Name: Linda Pierson  MRN: [id number]  Admission Date: 8/11/2022  Hospital Length of Stay: 0 days  Attending Provider: SALAS Davis MD  Primary Care Provider: Primary Doctor No  Principal Problem: <principal problem not specified>     Consults  Subjective:         History of Present Illness:  Linda Pierson is a 77 y.o. with history of Afib, Aortic stenosis, MO, with history of Stage IAG1 endometrial cancer s/p robotic hysterectomy and staging 9/2016 at [facility]. She has large pelvic mass seen on CT in 5/2022. She then had vaginal cuff biopsy performed which showed necrotic tissue negative for malignancy. She has been seen by Dr. Stephan Sharma in clinic regarding pelvic mass and plan was for possible surgical resection with colorectal surgery. Since that time, repeat imaging noted liver masses and plan was for possible IR biopsy of liver mass.     She then presented to OSH for weakness and fall approximately two days ago and was transferred to [facility] to the general surgery team. On arrival, VSS and labs notable for leukocytosis. Repeat imaging at OSH notable for gas containing exophytic mass in the pelvis now with direct luminal continuity with the sigmoid and rectum. Marked distension of the rectum, now [id number].     Gynecology consulted for evaluation given pelvic mass and history of endometrial cancer.     Patient reports continued vaginal vs rectal bleeding at home. She is wheelchair bound and lives with her family who are her primary caretakers. She reports stool and urinary incontinence at baseline. She is able to tolerate PO without N/V, however reports decreased PO intake as of late. She denies fevers/chills, abdominal pain, constipation or dysuria.       Oncology Treatment Plan:   [Could not find a treatment plan. This SmartLink may be configured incorrectly. Contact a  for help.]    Oncology History:      9/2016: s/p RA-TLH/BSO for Stage IAI Endometrial cancer at [facility]    5/9/2022 CT AP: 1. An 8.5 x 7.5 cm mass within the pelvis is noted and hemorrhage into  an abscess is suspected.  2. Large ventral abdominal wall hernia containing bowel loops is noted.  3. Postoperative changes     5/10/2022 MR Pelvis:  1. There is circumferential mural thickening and hyperenhancement of  the upper rectum as it abuts a 7.8 cm mass within the pelvis. No obvious malignancy can be documented.The bulk of this lesion appears to lie above the peritoneal reflection  2. Large ventral abdominal wall hernia     5/12/2022 Vaginal Cuff Biopsy:  -Fibroadipose tissue with ulcer and granulation tissue.  -Negative for carcinoma.  Rectal mass, biopsy:  -Necroinflammatory debris (see comment).   -No mucosa present for evaluation.  -Negative for carcinoma.    6/2/2022 CT A/P  1. Large, round heterogeneous soft tissue mass centered within the expected region of the vaginal cuff and likely distending the vagina.  Difficult to delineate fat planes, but appears to compress or directly invade the rectum.  Primary rectal neoplasm thought less likely given patient history.  Recommend correlation with previous sigmoidoscopy.  Additional mass effect on the bladder without definite invasion.  No suspicious lymphadenopathy.  2. Multiple ill-defined hypodensities in the liver.  Recommend further evaluation with contrast-enhanced MRI abdomen in order to exclude metastasis.  3. Bilateral hydronephrosis with distended urinary bladder, likely secondary to outlet obstruction from large pelvic mass.    IR guided liver biopsy scheduled, however not completed due to recent hospitalization.        Past Medical History:   Diagnosis Date    Anemia     Aortic stenosis     Hypertension     Paroxysmal atrial fibrillation     Sleep apnea     Uterine cancer      Past Surgical History:   Procedure Laterality Date    APPENDECTOMY  01/1991    [facility]  [place]    BILATERAL SALPINGO-OOPHORECTOM[place] (BSO) [place]     SECTION  1967    [facility] [place], [place]    [facility]    [place]  [facility]    [facility] [place]    HERNIA REPAIR  1988    [facility] [place] [place]    HERNIA REPAIR  2005    [employer] ([place]    HERNIA REPAIR  2018    [facility], [place]    [place]    KNEE SURGERY Right 2004    [employer], ([place])    KNEE SURGERY Left 2013    [employer], ([place]     Family History    None       Tobacco Use    Smoking status: Never Smoker    Smokeless tobacco: Never Used   Substance and Sexual Activity    Alcohol use: Not Currently    Drug use: Not Currently    Sexual activity: Not Currently       PTA Medications   Medication Sig    aspirin (ECOTRIN) 81 MG EC tablet Take 81 mg by mouth Daily.    calcium-vitamin D 600 mg-10 mcg (400 unit) Tab Take 1 tablet by mouth once daily.    cholecalciferol, vitamin D3, 125 mcg (5,000 unit) Tab Take 5,000 Units by mouth Daily.    cyanocobalamin (VITAMIN B-12) 1000 MCG tablet Take 1,000 mcg by mouth Daily.    ferrous sulfate (FEOSOL) 325 mg (65 mg iron) Tab tablet Take 325 mg by mouth Daily.    metoprolol succinate (TOPROL-XL) 25 MG 24 hr tablet Take 1 tablet by mouth once daily.    oxybutynin (DITROPAN-XL) 10 MG 24 hr tablet Take 1 tablet by mouth once daily.       Review of patient's allergies indicates:  No Known Allergies    Review of Systems   Constitutional:  Negative for fever.   Respiratory:  Negative for shortness of breath.    Cardiovascular:  Negative for chest pain.   Gastrointestinal:  Positive for fecal incontinence. Negative for abdominal pain, nausea and vomiting.   Endocrine: Negative for hot flashes.   Genitourinary:  Positive for vaginal bleeding.   Integumentary:  Negative  for breast mass, nipple discharge and breast skin changes.   Neurological:  Negative for headaches.   Hematological:  Does not bruise/bleed easily.   Psychiatric/Behavioral:  Negative for depression.    Breast: Negative for mass, mastodynia, nipple discharge and skin changes   Objective:     Vital Signs (Most Recent):  Temp: 97.5 °F (36.4 °C) (08/11/22 0809)  Pulse: 94 (08/11/22 0809)  Resp: 16 (08/11/22 0809)  BP: 114/66 (08/11/22 0809)  SpO2: 95 % (08/11/22 0809) Vital Signs (24h Range):  Temp:  [97.4 °F (36.3 °C)-98.4 °F (36.9 °C)] 97.5 °F (36.4 °C)  Pulse:  [] 94  Resp:  [13-18] 16  SpO2:  [92 %-95 %] 95 %  BP: (107-114)/(56-66) 114/66     Weight: (!) 149.7 kg (330 lb)  Body mass index is 54.91 kg/m².    Physical Exam:   Constitutional:   Deconditioned, morbidly obese             Abdominal: Soft.   Multiple reducible small hernias in abdomen. No tenderness to palpation. No distinct mass palpated however difficult secondary to body habitus. Skin with scaling     Genitourinary:    Genitourinary Comments: Stool noted when attempted to evaluate vagina. Dickson catheter in place with velasquez colored urine             Musculoskeletal:      Comments: Bed bound, unable to move legs for vaginal exam        Skin: Skin is warm and dry.   Scaling and flaking of skin, multiple bruises in BUL    Psychiatric: She has a normal mood and affect. Her behavior is normal.     Laboratory:  Recent Lab Results  (Last 5 results in the past 24 hours)        08/11/22  0806   08/11/22  0805   08/11/22  0603   08/11/22  0601   08/10/22  1815        Albumin       1.4         Alkaline Phosphatase       235         ALT       9         Anion Gap       12         Aniso     Slight                Slight           aPTT >150.0  Comment: aPTT therapeutic range = 39-69 seconds  PTT critical result(s) called and confirmed and verbal readback   obtained from Desiree Cruz RN hep off at 7 by [facility]1 08/11/2022   08:51         >150.0  Comment: aPTT  therapeutic range = 39-69 seconds  INR APTT   critical result(s) called and verbal readback obtained   from ELVIN GARCIA RN  by [facility] 08/11/2022 07:06                  >150.0  Comment: aPTT therapeutic range = 39-69 seconds  INR APTT   critical result(s) called and verbal readback obtained   from ELVIN GARCIA RN  by [facility] 08/11/2022 07:06           AST       15         Base Excess, Arterial         -6.2       Baso #     0.06                0.06           Basophil %     0.2                0.2           BILIRUBIN TOTAL       0.3  Comment: For infants and newborns, interpretation of results should be based  on gestational age, weight and in agreement with clinical  observations.    Premature Infant recommended reference ranges:  Up to 24 hours.............<8.0 mg/dL  Up to 48 hours............<12.0 mg/dL  3-5 days..................<15.0 mg/dL  6-29 days.................<15.0 mg/dL           BUN       57         Frances/Echinocytes     Occasional                Occasional           Calcium       8.1         Chloride       104         CO2       16         Creatinine       0.9         CRP       276.9         Differential Method     Automated                Automated           eGFR       >60.0         Eos #     0.0                0.0           Eosinophil %     0.0                0.0           FiO2         21.0       Glucose       108         Gran # (ANC)     25.5                25.5           Gran %     87.7                87.7           HCO3 ART         16.8       Hematocrit     31.0                31.0           Hemoglobin     9.7                9.7           Hypo     Occasional                Occasional           Immature Grans (Abs)     0.28  Comment: Mild elevation in immature granulocytes is non specific and   can be seen in a variety of conditions including stress response,   acute inflammation, trauma and pregnancy. Correlation with other   laboratory and clinical findings is essential.                  0.28  Comment:  Mild elevation in immature granulocytes is non specific and   can be seen in a variety of conditions including stress response,   acute inflammation, trauma and pregnancy. Correlation with other   laboratory and clinical findings is essential.             Immature Granulocytes     1.0                1.0           INR 1.1  Comment: Coumadin Therapy:  2.0 - 3.0 for INR for all indicators except mechanical heart valves  and antiphospholipid syndromes which should use 2.5 - 3.5.         9.5  Comment: Coumadin Therapy:  2.0 - 3.0 for INR for all indicators except mechanical heart valves  and antiphospholipid syndromes which should use 2.5 - 3.5.  INR APTT   critical result(s) called and verbal readback obtained   from ELVIN GARCIA RN  by [facility] 08/11/2022 07:06                  9.5  Comment: Coumadin Therapy:  2.0 - 3.0 for INR for all indicators except mechanical heart valves  and antiphospholipid syndromes which should use 2.5 - 3.5.  INR APTT   critical result(s) called and verbal readback obtained   from ELVIN GARCIA RN  by [facility] [place] 08/11/2022 07:06           Lactate, Nael       2.0  Comment: Falsely low lactic acid results can be found in samples   containing >=13.0 mg/dL total bilirubin and/or >=3.5 mg/dL   direct bilirubin.           Lymph #     1.9                1.9           Lymph %     6.7                6.7           MCH     27.2                27.2           MCHC     31.3                31.3           MCV     87                87           Mono #     1.3                1.3           Mono %     4.4                4.4           MPV     9.5                9.5           nRBC     0                0           O2 Sat, Arterial         95.9       pCO2, Arterial         27.2       pH, Arterial         7.41       Platelet Estimate     Appears normal                Appears normal           Platelets     335                335           pO2, Arterial         79       Poikilocytosis     Slight                Slight            Potassium       3.9         Prealbumin   8             PROTEIN TOTAL       5.0         Protime 11.0       87.5                87.5         RBC     3.56                3.56           RDW     19.5                19.5           Sodium       132         WBC     29.05                29.05                                      Assessment/Plan:     Pelvic mass  - s/p RA-TLH/BSO in 2016 in the setting of Stage IAI Endometrial Cancer  - Pelvic mass of unknown etiology now with concern for invasion into bladder, rectum, and possibly vagina  - Evaluated by staff, Gynecology Oncology available for surgical intervention as planned per primary team  - Agree with medical optimization before surgery attempted due to patient's multiple co morbidities and clinical status  - Thank you for your consult. We will continue to follow along. Please page [phone] with any concerns.             Katherine C Boecking, MD  Gynecologic Oncology  [facility]

## 2022-08-11 NOTE — ASSESSMENT & PLAN NOTE
Linda Pierson is a 77 year old female with a history of endometrial cancer, AF (on eliquis), pelvic mass of quetionable origin presently admitted with weakness. Urology was consulted for questionable bladder involvement.  - monitor and record UOP  - Cr improving with catheter placement, patient may benefit from bilateral nephrostomy tube placement given report of persistent bilateral hydro in the setting of large pelvic mass  - maintain crump catheter

## 2022-08-11 NOTE — H&P
Rigo James - [facility]  Colorectal Surgery  History & Physical    Patient Name: Linda Pierson  MRN: [id number]  Admission Date: 8/11/2022  Attending Physician: Ryan  Primary Care Provider: Primary Doctor Idalmis    Subjective:     Chief Complaint/Reason for Admission: Pelvic mass    History of Present Illness:     77F [facility] endometrial cancer s/p excision (2016), pelvic mass with compression of rectosigmoid colon, A-fib (on Eliquis) presenting with weakness. Pt is known to CRS service after initially presenting with hematochezia in May 2022 - work up at that time demonstrated a large necrotic pelvic mass with intrusion into the rectum. Biopsies performed demonstrated necroinflammatory debris with no sign of carcinoma. Pt presents today as transfer from an OSH - pt had reportedly fallen 2 days ago and has since had increasing weakness and diarrhea, prompting her family to bring her to the ED. Pt states the weakness has been present for months but she does feel her diarrhea is getting worse. Denies fevers/chills. Denies abdominal pain. Endorses back pain. Endorses poor appetite without nausea or emesis.     Upon arrival to OSH ED, AVSS. WBC 28. Lactate 1.8. CRP 26.4. CT abd/pelvis obtained which demonstrated gas containing exophytic mass in the pelvis now with direct luminal continuity with the sigmoid and rectum. Marked distension of the rectum, now [id number].         PTA Medications   Medication Sig    aspirin (ECOTRIN) 81 MG EC tablet Take 81 mg by mouth Daily.    calcium-vitamin D 600 mg-10 mcg (400 unit) Tab Take 1 tablet by mouth once daily.    cholecalciferol, vitamin D3, 125 mcg (5,000 unit) Tab Take 5,000 Units by mouth Daily.    cyanocobalamin (VITAMIN B-12) 1000 MCG tablet Take 1,000 mcg by mouth Daily.    ferrous sulfate (FEOSOL) 325 mg (65 mg iron) Tab tablet Take 325 mg by mouth Daily.    metoprolol succinate (TOPROL-XL) 25 MG 24 hr tablet Take 1 tablet by mouth once daily.    oxybutynin (DITROPAN-XL) 10 MG 24  hr tablet Take 1 tablet by mouth once daily.       Review of patient's allergies indicates:  No Known Allergies    Past Medical History:   Diagnosis Date    Anemia     Aortic stenosis     Hypertension     Paroxysmal atrial fibrillation     Sleep apnea     Uterine cancer      Past Surgical History:   Procedure Laterality Date    [place]    [facility]    BILATERAL SALPINGO-OOPHOR[place]) [place]     SECTION  1967    [facility] [place] [place]    [facility]G[place]      [facility]    [place]    [facility] [place]    HERNIA REPAIR  1988    [facility] of [place] [place]    HERNIA REPAIR  2005    [employer] ([place]    [place]ERN[place]  2018    [facility]    HY[place]    KNEE SURGERY Right 2004    [employer], ([place]    KNEE SURGERY Left 2013    [employer], ([place]     Family History    None       Tobacco Use    Smoking status: Never Smoker    Smokeless tobacco: Never Used   Substance and Sexual Activity    Alcohol use: Not Currently    Drug use: Not Currently    Sexual activity: Not Currently     Review of Systems  Objective:     Vital Signs (Most Recent):  Temp: 97.4 °F (36.3 °C) (22 0300)  Pulse: 98 (22 0300)  Resp: 18 (22 0300)  BP: (!) 110/56 (22 0300)  SpO2: (!) 93 % (22 0300) Vital Signs (24h Range):  Temp:  [97.4 °F (36.3 °C)-98.4 °F (36.9 °C)] 97.4 °F (36.3 °C)  Pulse:  [] 98  Resp:  [13-18] 18  SpO2:  [92 %-93 %] 93 %  BP: (107-110)/(56) 110/56     Weight: (!) 149.7 kg (330 lb)  Body mass index is 54.91 kg/m².    Physical Exam    Anorectal Exam:  Anal Skin: Normal    Digital Rectal Exam:  Resting Tone low    Mass none  Rectocele  absent  Tenderness  absent    Significant Labs:  WBC 28  Lactate 1.8  CRP 26.4    Significant  Diagnostics:    CT abd/pelvis:  Postoperative changes are seen in the stomach from previous gastric   bypass.  There is no evidence of small-bowel obstruction.  There is a   large periumbilical hernia defect containing segments of nonobstructed   small bowel.  The proximal colon is unremarkable.  Previously described   gas containing exophytic mass in the pelvis now demonstrates direct   luminal continuity with the sigmoid colon and rectum.  There is marked   distension of the rectum, which is worse when compared to previous exam   and now measures 22.6 x 11.2 on sagittal image 48 (previously 18.4 x   10.1 cm on sagittal image 602).  There is abnormal soft tissue   attenuation along the anterior aspect of the rectal wall.       Assessment/Plan:       77F [facility] endometrial cancer s/p excision (2016), pelvic mass with compression of rectosigmoid colon, A-fib (on Eliquis) presenting with weakness, leukocytosis    - IV abx: Zosyn  - NPO/IVF  - Heparin gtt for A-fib  - IR consult for biopsy of liver lesion  - TTE  - Discussed with attending Dr. Ryan LANE MD  Colorectal Surgery  [facility]

## 2022-08-11 NOTE — HOSPITAL COURSE
08/11/2022 admitted for progressive weakness/fall at home. Primary care per primary team. Gynecology Oncology consulted for evaluation of pelvic mass and possible surgical intervention when patient medically optimized.

## 2022-08-11 NOTE — PROCEDURES
"Linda Pierson is a 77 y.o. female patient.    Temp: 97.5 °F (36.4 °C) (08/11/22 0809)  Pulse: 94 (08/11/22 0809)  Resp: 16 (08/11/22 0809)  BP: 114/66 (08/11/22 0809)  SpO2: 95 % (08/11/22 0809)  Weight: (!) 149.7 kg (330 lb) (08/11/22 0809)  Height: 5' 5" (165.1 cm) (08/11/22 0809)    PICC  Date/Time: 8/11/2022 3:52 PM  Performed by: Ophelia Leahy RN  Assisting provider: Jame Us RN  Consent Done: Yes  Time out: Immediately prior to procedure a time out was called to verify the correct patient, procedure, equipment, support staff and site/side marked as required  Indications: med administration and vascular access  Anesthesia: local infiltration  Local anesthetic: lidocaine 1% without epinephrine  Anesthetic Total (mL): 3  Description of findings: PICC  Preparation: skin prepped with ChloraPrep  Skin prep agent dried: skin prep agent completely dried prior to procedure  Sterile barriers: all five maximum sterile barriers used - cap, mask, sterile gown, sterile gloves, and large sterile sheet  Hand hygiene: hand hygiene performed prior to central venous catheter insertion  Location details: right basilic  Catheter type: double lumen  Catheter size: 5 Fr  Catheter Length: 37cm    Ultrasound guidance: yes  Vessel Caliber: medium and patent, compressibility normal  Vascular Doppler: not done  Needle advanced into vessel with real time Ultrasound guidance.  Guidewire confirmed in vessel.  Image recorded and saved.  Sterile sheath used.  Number of attempts: 1  Post-procedure: blood return through all ports, chlorhexidine patch and sterile dressing applied  Technical procedures used: 3CG  Specimens: No  Implants: No  Assessment: placement verified by x-ray  Complications: none          Name   8/11/2022  "

## 2022-08-11 NOTE — CONSULTS
Inpatient Radiology Consult Note    History of Present Illness:  Linda Pierson is a 77 y.o. female with PMHx of endometrial cancer s/p excision (2016), current large pelvic mass of uncertain etiology (multiple biopsies showing necrotic debris per CRC) and A-fib (on Eliquis).  She is scheduled for outpatient liver mass biopsy with IR on 8/15.   Pt presents to the ED with weakness and diarrhea s/p fall 2 days ago. Currently with leukocytosis and being managed empirically for infection. IR consulted for inpatient liver biopsy.      Plan:   1. Patient's acute issues should be resolved before the liver mass biopsy is attempted.  2. If patient remains admitted through her scheduled outpatient bx appointment, we can potentially attempt inpatient biopsy if schedule permits. The biopsy should not delay discharge though.   3. Hold anticoagulation if possible.  Plan discussed with CRC resident.    Devika Stephenson MD  Department of Radiology, PGY-4  Pager: [phone]

## 2022-08-12 PROBLEM — Z51.5 PALLIATIVE CARE ENCOUNTER: Status: ACTIVE | Noted: 2022-08-12

## 2022-08-12 LAB
ALBUMIN SERPL BCP-MCNC: 1.2 G/DL (ref 3.5–5.2)
ALP SERPL-CCNC: 230 U/L (ref 55–135)
ALT SERPL W/O P-5'-P-CCNC: 10 U/L (ref 10–44)
ANION GAP SERPL CALC-SCNC: 12 MMOL/L (ref 8–16)
ANISOCYTOSIS BLD QL SMEAR: SLIGHT
APTT BLDCRRT: >150 SEC (ref 21–32)
AST SERPL-CCNC: 14 U/L (ref 10–40)
BASOPHILS # BLD AUTO: 0.04 K/UL (ref 0–0.2)
BASOPHILS NFR BLD: 0.2 % (ref 0–1.9)
BILIRUB SERPL-MCNC: 0.2 MG/DL (ref 0.1–1)
BUN SERPL-MCNC: 51 MG/DL (ref 8–23)
BURR CELLS BLD QL SMEAR: ABNORMAL
CALCIUM SERPL-MCNC: 7.4 MG/DL (ref 8.7–10.5)
CHLORIDE SERPL-SCNC: 103 MMOL/L (ref 95–110)
CO2 SERPL-SCNC: 18 MMOL/L (ref 23–29)
CREAT SERPL-MCNC: 0.8 MG/DL (ref 0.5–1.4)
DIFFERENTIAL METHOD: ABNORMAL
EOSINOPHIL # BLD AUTO: 0.1 K/UL (ref 0–0.5)
EOSINOPHIL NFR BLD: 0.3 % (ref 0–8)
ERYTHROCYTE [DISTWIDTH] IN BLOOD BY AUTOMATED COUNT: 19.5 % (ref 11.5–14.5)
EST. GFR  (NO RACE VARIABLE): >60 ML/MIN/1.73 M^2
GLUCOSE SERPL-MCNC: 99 MG/DL (ref 70–110)
HCT VFR BLD AUTO: 25.7 % (ref 37–48.5)
HGB BLD-MCNC: 8.1 G/DL (ref 12–16)
IMM GRANULOCYTES # BLD AUTO: 0.18 K/UL (ref 0–0.04)
IMM GRANULOCYTES NFR BLD AUTO: 0.7 % (ref 0–0.5)
LYMPHOCYTES # BLD AUTO: 1.8 K/UL (ref 1–4.8)
LYMPHOCYTES NFR BLD: 7.5 % (ref 18–48)
MAGNESIUM SERPL-MCNC: 2.4 MG/DL (ref 1.6–2.6)
MCH RBC QN AUTO: 27.6 PG (ref 27–31)
MCHC RBC AUTO-ENTMCNC: 31.5 G/DL (ref 32–36)
MCV RBC AUTO: 88 FL (ref 82–98)
MONOCYTES # BLD AUTO: 1.1 K/UL (ref 0.3–1)
MONOCYTES NFR BLD: 4.6 % (ref 4–15)
NEUTROPHILS # BLD AUTO: 21.2 K/UL (ref 1.8–7.7)
NEUTROPHILS NFR BLD: 86.7 % (ref 38–73)
NRBC BLD-RTO: 0 /100 WBC
PHOSPHATE SERPL-MCNC: 3.7 MG/DL (ref 2.7–4.5)
PLATELET # BLD AUTO: 373 K/UL (ref 150–450)
PMV BLD AUTO: 10 FL (ref 9.2–12.9)
POCT GLUCOSE: 130 MG/DL (ref 70–110)
POCT GLUCOSE: 136 MG/DL (ref 70–110)
POCT GLUCOSE: 141 MG/DL (ref 70–110)
POCT GLUCOSE: 153 MG/DL (ref 70–110)
POCT GLUCOSE: 159 MG/DL (ref 70–110)
POIKILOCYTOSIS BLD QL SMEAR: SLIGHT
POLYCHROMASIA BLD QL SMEAR: ABNORMAL
POTASSIUM SERPL-SCNC: 3.5 MMOL/L (ref 3.5–5.1)
PROT SERPL-MCNC: 4.5 G/DL (ref 6–8.4)
RBC # BLD AUTO: 2.93 M/UL (ref 4–5.4)
SCHISTOCYTES BLD QL SMEAR: ABNORMAL
SODIUM SERPL-SCNC: 133 MMOL/L (ref 136–145)
WBC # BLD AUTO: 24.5 K/UL (ref 3.9–12.7)

## 2022-08-12 PROCEDURE — 99223 PR INITIAL HOSPITAL CARE,LEVL III: ICD-10-PCS | Mod: ,,, | Performed by: INTERNAL MEDICINE

## 2022-08-12 PROCEDURE — 25000003 PHARM REV CODE 250: Performed by: STUDENT IN AN ORGANIZED HEALTH CARE EDUCATION/TRAINING PROGRAM

## 2022-08-12 PROCEDURE — 85730 THROMBOPLASTIN TIME PARTIAL: CPT | Performed by: COLON & RECTAL SURGERY

## 2022-08-12 PROCEDURE — 83735 ASSAY OF MAGNESIUM: CPT | Performed by: STUDENT IN AN ORGANIZED HEALTH CARE EDUCATION/TRAINING PROGRAM

## 2022-08-12 PROCEDURE — 25000003 PHARM REV CODE 250: Performed by: NURSE PRACTITIONER

## 2022-08-12 PROCEDURE — 84100 ASSAY OF PHOSPHORUS: CPT | Performed by: STUDENT IN AN ORGANIZED HEALTH CARE EDUCATION/TRAINING PROGRAM

## 2022-08-12 PROCEDURE — 25000003 PHARM REV CODE 250: Performed by: COLON & RECTAL SURGERY

## 2022-08-12 PROCEDURE — 97530 THERAPEUTIC ACTIVITIES: CPT

## 2022-08-12 PROCEDURE — B4185 PARENTERAL SOL 10 GM LIPIDS: HCPCS

## 2022-08-12 PROCEDURE — 63600175 PHARM REV CODE 636 W HCPCS: Performed by: STUDENT IN AN ORGANIZED HEALTH CARE EDUCATION/TRAINING PROGRAM

## 2022-08-12 PROCEDURE — 63600175 PHARM REV CODE 636 W HCPCS

## 2022-08-12 PROCEDURE — 99223 1ST HOSP IP/OBS HIGH 75: CPT | Mod: ,,, | Performed by: INTERNAL MEDICINE

## 2022-08-12 PROCEDURE — 25000003 PHARM REV CODE 250

## 2022-08-12 PROCEDURE — 97161 PT EVAL LOW COMPLEX 20 MIN: CPT

## 2022-08-12 PROCEDURE — 97535 SELF CARE MNGMENT TRAINING: CPT

## 2022-08-12 PROCEDURE — A4216 STERILE WATER/SALINE, 10 ML: HCPCS | Performed by: COLON & RECTAL SURGERY

## 2022-08-12 PROCEDURE — 63600175 PHARM REV CODE 636 W HCPCS: Performed by: COLON & RECTAL SURGERY

## 2022-08-12 PROCEDURE — 97165 OT EVAL LOW COMPLEX 30 MIN: CPT

## 2022-08-12 PROCEDURE — 85025 COMPLETE CBC W/AUTO DIFF WBC: CPT

## 2022-08-12 PROCEDURE — 80053 COMPREHEN METABOLIC PANEL: CPT | Performed by: STUDENT IN AN ORGANIZED HEALTH CARE EDUCATION/TRAINING PROGRAM

## 2022-08-12 PROCEDURE — A4217 STERILE WATER/SALINE, 500 ML: HCPCS

## 2022-08-12 PROCEDURE — 20600001 HC STEP DOWN PRIVATE ROOM

## 2022-08-12 RX ORDER — ENOXAPARIN SODIUM 150 MG/ML
1 INJECTION SUBCUTANEOUS 2 TIMES DAILY
Status: DISCONTINUED | OUTPATIENT
Start: 2022-08-12 | End: 2022-08-12

## 2022-08-12 RX ORDER — ENOXAPARIN SODIUM 100 MG/ML
1 INJECTION SUBCUTANEOUS
Status: DISCONTINUED | OUTPATIENT
Start: 2022-08-12 | End: 2022-08-12

## 2022-08-12 RX ORDER — LOPERAMIDE HYDROCHLORIDE 2 MG/1
2 CAPSULE ORAL 4 TIMES DAILY PRN
Status: DISCONTINUED | OUTPATIENT
Start: 2022-08-12 | End: 2022-08-20 | Stop reason: HOSPADM

## 2022-08-12 RX ORDER — POTASSIUM CHLORIDE 750 MG/1
40 CAPSULE, EXTENDED RELEASE ORAL ONCE
Status: COMPLETED | OUTPATIENT
Start: 2022-08-12 | End: 2022-08-12

## 2022-08-12 RX ORDER — ENOXAPARIN SODIUM 150 MG/ML
1 INJECTION SUBCUTANEOUS 2 TIMES DAILY
Status: DISCONTINUED | OUTPATIENT
Start: 2022-08-12 | End: 2022-08-14

## 2022-08-12 RX ADMIN — Medication 10 ML: at 06:08

## 2022-08-12 RX ADMIN — LOPERAMIDE HYDROCHLORIDE 2 MG: 2 CAPSULE ORAL at 08:08

## 2022-08-12 RX ADMIN — SOYBEAN OIL 250 ML: 20 INJECTION, SOLUTION INTRAVENOUS at 10:08

## 2022-08-12 RX ADMIN — PIPERACILLIN SODIUM AND TAZOBACTAM SODIUM 4.5 G: 4; .5 INJECTION, POWDER, LYOPHILIZED, FOR SOLUTION INTRAVENOUS at 08:08

## 2022-08-12 RX ADMIN — LOPERAMIDE HYDROCHLORIDE 2 MG: 2 CAPSULE ORAL at 02:08

## 2022-08-12 RX ADMIN — ENOXAPARIN SODIUM 150 MG: 150 INJECTION SUBCUTANEOUS at 03:08

## 2022-08-12 RX ADMIN — OXYCODONE 5 MG: 5 TABLET ORAL at 10:08

## 2022-08-12 RX ADMIN — ASPIRIN 81 MG: 81 TABLET, COATED ORAL at 06:08

## 2022-08-12 RX ADMIN — METOPROLOL SUCCINATE 25 MG: 25 TABLET, EXTENDED RELEASE ORAL at 08:08

## 2022-08-12 RX ADMIN — PIPERACILLIN SODIUM AND TAZOBACTAM SODIUM 4.5 G: 4; .5 INJECTION, POWDER, LYOPHILIZED, FOR SOLUTION INTRAVENOUS at 03:08

## 2022-08-12 RX ADMIN — Medication 10 ML: at 12:08

## 2022-08-12 RX ADMIN — PIPERACILLIN SODIUM AND TAZOBACTAM SODIUM 4.5 G: 4; .5 INJECTION, POWDER, LYOPHILIZED, FOR SOLUTION INTRAVENOUS at 11:08

## 2022-08-12 RX ADMIN — POTASSIUM CHLORIDE 40 MEQ: 10 CAPSULE, COATED, EXTENDED RELEASE ORAL at 10:08

## 2022-08-12 RX ADMIN — LOPERAMIDE HYDROCHLORIDE 2 MG: 2 CAPSULE ORAL at 10:08

## 2022-08-12 RX ADMIN — MAGNESIUM SULFATE HEPTAHYDRATE: 500 INJECTION, SOLUTION INTRAMUSCULAR; INTRAVENOUS at 10:08

## 2022-08-12 RX ADMIN — OXYCODONE 5 MG: 5 TABLET ORAL at 03:08

## 2022-08-12 NOTE — PLAN OF CARE
Problem: Occupational Therapy  Goal: Occupational Therapy Goal  Description: Goals to be met by: 9/12/2022 (1 month)     Patient will increase functional independence with ADLs by performing:    UE Dressing with Minimal Assistance.  Grooming while seated at EOB with Set-up Assistance.  Toileting from bedside commode with Moderate Assistance for hygiene and clothing management.   Sitting at edge of bed x10 minutes with Minimal Assistance.  Rolling to Bilateral with Minimal Assistance.   Supine to sit with Minimal Assistance.  Stand pivot transfers with Maximum Assistance.  Step transfer with Maximum Assistance  Toilet transfer to bedside commode with Maximum Assistance.    Evaluated pt and established OT POC. Continue OT as tolerated.  Yahaira Cade OT  8/12/2022    Outcome: Ongoing, Progressing

## 2022-08-12 NOTE — ASSESSMENT & PLAN NOTE
77 year old female with large pelvic mass of unknown etiology involving the rectum, vaginal cuff, and bladder. Biopsies thus far have been inconclusive. Palliative consulted for GOC    Code status: Full, not addressed     Surrogate decision maker: Legal NOK is patient's 6 children     GOC/ACP  -Met with patient, significant other, 1 son, and daughter in law at bedside. Patient too sleepy to participate. Introduced palliative medicine. Significant other shared their journey since mass was first discovered in May. Patient has had a gradual decline since this time with a sharp decline over the past month. Prior to this patient is described as very active and always on the go. Family understands she cannot undergo surgery at this time. Family is hoping that patient will get stronger with TPN and PT/OT. They are hopeful that a tissue dx can be obtained. They are open to continued discussions as more is known.     -Will continue to follow and have GOC discussions as appropriate. Hoping that patient will be able to engage in conversation on follow-up. Family would like to have a family meeting with all 6 children if any big decisions need to be made

## 2022-08-12 NOTE — PLAN OF CARE
Recommendations    1. Continue Regular diet with texture per SLP    2. TPN rec: 140g D, 110g AA + IL to provide 1416kcal, 110g protein. GIR = 0.6 mg/kg/min.     - Wean TPN as PO intake increase    3. RD to monitor and follow    Goals: Meet % EEN, EPN by RD f/u  Nutrition Goal Status: new  Communication of RD Recs:  (POC)

## 2022-08-12 NOTE — PLAN OF CARE
POC is reviewed and understood by patient. Oriented x4. Assist x4. Family at bedside. Pt tested negative for C. Diff, although pt still has frequent loose stools. Imodium ordered and given. Rectal tube removed due to rectal abscess. Dickson in place by urology. No c/o pain, nausea. No sign of distress noted. HOB elevated. Bed in lowest position. Call bell in reach. WCTM.

## 2022-08-12 NOTE — SUBJECTIVE & OBJECTIVE
Past Medical History:   Diagnosis Date    Anemia     Aortic stenosis     Hypertension     Paroxysmal atrial fibrillation     Sleep apnea     Uterine cancer        Past Surgical History:   Procedure Laterality Date    APPENDECTOMY  1991    [employer], [place]    BILATERAL SALPINGO-OOPHORECTOMY (BSO) [place]ilateral [place]    [place]     SE[place]  1967    [facility] [place]    [facility]    [place]    [facility]    HERNIA REPAIR  1988    [facility] [place]    HERNIA REPAIR  2005    [employer] ([place])    [place]ERNIA REPAIR  2018    [place]    HYSTERECTOMY Bilateral 2016    [place]    KNEE SURGERY Right 2004    [employer], ([place], [place])    KNEE SURGERY Left 2013    [employer], ([place])       Review of patient's allergies indicates:  No Known Allergies    Medications:  Continuous Infusions:   dextrose 10 % in water (D10W)      dextrose 5 % and 0.9 % NaCl 20 mL/hr at 22 0740    TPN ADULT CENTRAL LINE CUSTOM 42 mL/hr at 22 2251    TPN ADULT CENTRAL LINE CUSTOM       Scheduled Meds:   aspirin  81 mg Oral Daily    enoxaparin  1 mg/kg Subcutaneous BID    fat emulsion 20%  250 mL Intravenous Daily    metoprolol succinate  25 mg Oral Daily    piperacillin-tazobactam (ZOSYN) IVPB  4.5 g Intravenous Q8H    sodium chloride 0.9%  10 mL Intravenous Q6H     PRN Meds:acetaminophen, dextrose 10 % in water (D10W), dextrose 10%, dextrose 10%, glucagon (human recombinant), loperamide, melatonin, naloxone, ondansetron, oxyCODONE, sodium chloride 0.9%, sodium chloride 0.9%, Flushing PICC Protocol **AND** sodium chloride 0.9% **AND** sodium chloride 0.9%    Family History    None       Tobacco Use    Smoking status: Never Smoker    Smokeless tobacco: Never Used   Substance and Sexual Activity    Alcohol use: Not Currently    Drug use:  Not Currently    Sexual activity: Not Currently       Review of Systems   Unable to perform ROS: Other  Patient too sleepy to answer questions   Objective:     Vital Signs (Most Recent):  Temp: 97.9 °F (36.6 °C) (08/12/22 1608)  Pulse: 106 (08/12/22 1608)  Resp: 16 (08/12/22 1608)  BP: (!) 101/58 (08/12/22 1608)  SpO2: (!) 92 % (08/12/22 1608)   Vital Signs (24h Range):  Temp:  [97.4 °F (36.3 °C)-98.4 °F (36.9 °C)] 97.9 °F (36.6 °C)  Pulse:  [] 106  Resp:  [16-20] 16  SpO2:  [92 %-96 %] 92 %  BP: ()/(52-63) 101/58     Weight: (!) 149.7 kg (330 lb)  Body mass index is 54.91 kg/m².    Physical Exam  Vitals and nursing note reviewed.   Constitutional:       General: She is not in acute distress.     Appearance: She is ill-appearing.      Comments: Sleepy. Easily wakes up but goes right back to sleep   HENT:      Head: Normocephalic.      Right Ear: External ear normal.      Left Ear: External ear normal.      Nose: Nose normal.      Mouth/Throat:      Mouth: Mucous membranes are moist.   Eyes:      Pupils: Pupils are equal, round, and reactive to light.   Cardiovascular:      Rate and Rhythm: Tachycardia present.   Pulmonary:      Effort: Pulmonary effort is normal. No respiratory distress.   Abdominal:      General: There is no distension.   Musculoskeletal:      Cervical back: Normal range of motion.      Right lower leg: Edema present.      Left lower leg: Edema present.       Review of Symptoms      Symptom Assessment (ESAS 0-10 Scale)  Unable to complete assessment due to Other     CAM / Delirium:  Negative  Constipation:  Negative  Diarrhea:  Positive      Bowel Management Plan (BMP):  Yes      Pain Assessment in Advanced Demential Scale (PAINAD)   Breathing - Independent of vocalization:  0  Negative vocalization:  0  Facial expression:  0  Body language:  0  Consolability:  0  Total:  0    Modified Nafisa Scale:  0    ECOG Performance Status rdGrdrrdarddrderd:rd rd3rd Living Arrangement: Lives with significant  other.    Psychosocial/Cultural: Pt has been with significant other for many years. Pt has 6 children. Enjoys playing binKrush and traveling in her RV    Spiritual:  F - Carlee and Belief:  Gnosticist    Patient too sleepy to answer ESAS       Advance Care Planning   Advance Directives:   Living Will: No    LaPOST: No    Do Not Resuscitate Status: No    Medical Power of : No      Decision Making:  Family answered questions       Significant Labs: All pertinent labs within the past 24 hours have been reviewed.  CBC:   Recent Labs   Lab 08/12/22  0406   WBC 24.50*   HGB 8.1*   HCT 25.7*   MCV 88        BMP:  Recent Labs   Lab 08/12/22 0406   GLU 99   *   K 3.5      CO2 18*   BUN 51*   CREATININE 0.8   CALCIUM 7.4*   MG 2.4     LFT:  Lab Results   Component Value Date    AST 14 08/12/2022    ALKPHOS 230 (H) 08/12/2022    BILITOT 0.2 08/12/2022     Albumin:   Albumin   Date Value Ref Range Status   08/12/2022 1.2 (L) 3.5 - 5.2 g/dL Final     Protein:   Total Protein   Date Value Ref Range Status   08/12/2022 4.5 (L) 6.0 - 8.4 g/dL Final     Lactic acid:   Lab Results   Component Value Date    LACTATE 2.0 08/11/2022    LACTATE 1.8 08/10/2022       Significant Imaging: I have reviewed all pertinent imaging results/findings within the past 24 hours.    CT abdomen/pelvis 6/22  Impression:     1. Large, round heterogeneous soft tissue mass centered within the expected region of the vaginal cuff and likely distending the vagina.  Difficult to delineate fat planes, but appears to compress or directly invade the rectum.  Primary rectal neoplasm thought less likely given patient history.  Recommend correlation with previous sigmoidoscopy.  Additional mass effect on the bladder without definite invasion.  No suspicious lymphadenopathy.  2. Multiple ill-defined hypodensities in the liver.  Recommend further evaluation with contrast-enhanced MRI abdomen in order to exclude metastasis.  3. Bilateral  hydronephrosis with distended urinary bladder, likely secondary to outlet obstruction from large pelvic mass.  4. Midline ventral abdominal hernia containing multiple loops of small bowel and mesentery without evidence of complication.  5. Hepatomegaly.  6. Additional findings as above.  This report was flagged in Epic as abnormal.

## 2022-08-12 NOTE — PROCEDURES
[facility]  Operative Note    Date: 08/12/2022    Pre-Op Diagnosis: indwelling ureteral stent    Post-Op Diagnosis: same    Procedure(s) Performed:   1.  Flexible cystoscopy  2.  Bimanual pelvic exam    Specimen(s): none    Staff Surgeon: Germán Sotelo MD    Assistant Surgeon: Mahad Veloz    Anesthesia: local    Indications: Linda Pierson is a 77 y.o. y.o. female with a pelvic mass that appears to involve the posterior aspect of the bladder. Dr. Davis has requested a cystoscopy for evaluation of the bladder and to discern degree of bladder involvement.    Findings:   - Cystoscopy shows significant irritation of bladder neck with scarred fibers along posterior aspect.  - Significant distortion of bladder floor and posterior bladder wall with reactive edema indicating invasion of pelvic mass into bladder wall and muscle. No biopsies taken from cystoscopy  - Unable to identify trigone architecture or ureteral orifices  - Small site of edema and erythema along bladder dome, likely due to catheter irritation.  - Grade 3 trabeculations noted on left and right bladder wall, no evidence of irritation or edema suggesting these portions of the bladder are uninvolved by the mass  - Images uploaded to media tab  - Bimanual exam technically difficult due to patient's body habitus and immobility. Palpable mass in vagina soft and mobile, more consistent with vaginal vault prolapse as opposed to a protrusion of the known pelvic mass. Unable to palpate bladder from bimanual exam. Uterus known to be surgically absent from history and prior images.    Estimated Blood Loss: none    Drains: 18 Fr crump catheter with 10 cc sterile water in balloon.    Procedure in Detail:  After informed consent was obtained the patient was prepped and draped in a sterile manner in her hospital bed. A 16 Tamazight flexible cystoscope was inserted into the urethra and advanced into the urinary bladder. Cystoscopy was performed  with the findings as described above. A guide wire was placed through the cystoscope and the scope was removed. An 18 Fr crump catheter was placed over the wire and confirmed with clear return. The balloon was filled with 10 cc of sterile water in the balloon. A bimanual exam was then performed with findings as described above. The patient tolerated the procedure well.    Disposition: Finding will be reported to Dr. Davis with colorectal. Urology will be available to assist with any surgical planning if the need arises.    Mahad Veloz MD

## 2022-08-12 NOTE — MEDICAL/APP STUDENT
Subjective:       Patient ID: Linda Pierson is a 77 y.o. female with a history of Afib, AS, MO, and Stage IAG1 endometrial cancer s/p robotic hysterectomy and staging 9/2016 at [facility]. She was transferred to [facility] to the general surgery team.     Chief Complaint: No chief complaint on file.    Interval hx:  NAEON. Pt appears well. Reports having a good night sleep and smiling. Has her significant other, daughter, and son in law in the room. Had 1 bowel movement last night-- dark brown, liquidy, no blood.    Review of Systems   Constitutional: Negative.    HENT: Negative.    Eyes: Negative.    Respiratory: Negative.    Cardiovascular: Negative.    Gastrointestinal: Negative.    Endocrine: Negative.    Genitourinary: Negative.    Musculoskeletal: Negative.    Integumentary:  Negative.   Allergic/Immunologic: Negative.    Neurological: Negative.    Hematological: Negative.    Psychiatric/Behavioral: Negative.          Objective:       Temp:  [97.4 °F (36.3 °C)-97.8 °F (36.6 °C)] 97.8 °F (36.6 °C)  Pulse:  [85-94] 91  Resp:  [16-18] 18  SpO2:  [95 %-96 %] 96 %  BP: ()/(56-66) 101/57  No acute distress  Abdomen soft, non-tender    Physical Exam    No acute distress  Abdomen soft, non-tender    Assessment:    Pt appears to be doing better than when assessed yesterday.    Problem List Items Addressed This Visit    None     Visit Diagnoses     Abscess of sigmoid colon        Relevant Orders    Reason for Not Discontinuing Dickson Post-OP    Echo (Completed)    Inpatient consult to Interventional Radiology (Completed)    Vital signs          Plan:       - s/p RA-TLH/BSO in 2016 in the setting of Stage IAI Endometrial Cancel  - Pelvic mass of unknown etiology now with concern for invasion into bladder, rectum, and possibly vagina  - Evaluated by staff, Gynecology Oncology available for surgical intervention as planned per primary team  - Agree with medical optimization before surgery attempted due to  patient's multiple co morbidities and clinical status    ***

## 2022-08-12 NOTE — PLAN OF CARE
Discharge Recommendation: SNF.    Evaluation completed today. PT goals appropriate.    Patient is safe to perform bed level therex with nursing staff.    Please continue Progressive Mobility Protocol as appropriate.    Gaye Wilson, PT, DPT  2022  Pager: [phone]        Problem: Physical Therapy  Goal: Physical Therapy Goal  Description: Goals to be met by: 2022     Patient will increase functional independence with mobility by performin. Sit to stand transfer with Moderate Assistance  2. Bed to chair transfer with Maximum Assistance using slide board prn  3. Sitting at edge of bed x15 minutes with Supervision  4. Stand for 2 minutes with Maximum Assistance using LRAD  5. Lower extremity exercise program x30 reps per handout, with independence    Outcome: Ongoing, Progressing

## 2022-08-12 NOTE — CONSULTS
"  [facility]  Adult Nutrition  Consult Note    SUMMARY     Recommendations    1. Continue Regular diet with texture per SLP    2. TPN rec: 140g D, 110g AA + IL to provide 1416kcal, 110g protein. GIR = 0.6 mg/kg/min.     - Wean TPN as PO intake increase    3. RD to monitor and follow    Goals: Meet % EEN, EPN by RD f/u  Nutrition Goal Status: new  Communication of RD Recs:  (POC)    Assessment and Plan    Nutrition Problem  Increased nutrient needs (protein, energy)    Related to (etiology):   Increased metabolic demands    Signs and Symptoms (as evidenced by):   Pelvic mass    Interventions/Recommendations (treatment strategy):  Collaboration with other providers    Nutrition Diagnosis Status:   New     Reason for Assessment    Reason For Assessment: new TPN  Diagnosis:  (Pelvic mass)  Interdisciplinary Rounds: did not attend    General Information Comments:   Pt with PMH of endometrial cancer Stage 1A grade 1 s/p TLH/BSO (2016), she now presents with a pelvic mass with compression of rectosigmoid colon.  Per chart review, pt had poor appetite without N/V. PICC in place and started on TPN. Current TPN order 50gAA, 151g D + IL providing 1213 kcal. Per wt hx, pt gained 20 lb x 2 months. TPN rec provided based on IBW. No indicator of malnutrition.    Wt Readings from Last 30 Encounters:   08/11/22 (!) 149.7 kg (330 lb)   08/11/22 (!) 145 kg (319 lb 10.7 oz)   06/10/22 (!) 143.3 kg (315 lb 14.4 oz)   06/03/22 (!) 140.2 kg (309 lb)       Nutrition Discharge Planning: Pending medical course    Nutrition Risk Screen    Nutrition Risk Screen: no indicators present    Nutrition/Diet History    Spiritual, Cultural Beliefs, Religion Practices, Values that Affect Care: no    Anthropometrics    Temp: 97.9 °F (36.6 °C)  Height: 5' 5" (165.1 cm)  Height (inches): 65 in  Weight Method: Bed Scale  Weight: (!) 149.7 kg (330 lb)  Weight (lb): (!) 330 lb  Ideal Body Weight (IBW), Female: 125 lb  % Ideal Body Weight, Female " (lb): 264 %  BMI (Calculated): 54.9     Lab/Procedures/Meds    Pertinent Labs Reviewed: reviewed  Pertinent Labs Comments: Na 133, BUN 51,   Pertinent Medications Reviewed: reviewed    Estimated/Assessed Needs    Weight Used For Calorie Calculations: 56.7 kg (125 lb) (IBW)  Energy Calorie Requirements (kcal): 2684-9524 kcal  Energy Need Method: Kcal/kg (22-25kcal/kg IBW)  Protein Requirements: 113-142g (2-2.5g/kg IBW)  Weight Used For Protein Calculations: 56.7 kg (125 lb)  Fluid Requirements (mL): 1ml/kcal or per MD  Estimated Fluid Requirement Method: RDA Method  RDA Method (mL): 1254     Nutrition Prescription Ordered    Current Diet Order: Regular    Evaluation of Received Nutrient/Fluid Intake    I/O: -  Comments: LBTONEY 8/12    Nutrition Risk    Level of Risk/Frequency of Follow-up: low     Monitor and Evaluation    Food and Nutrient Intake: energy intake, food and beverage intake, parenteral nutrition intake  Food and Nutrient Adminstration: enteral and parenteral nutrition administration, diet order  Physical Activity and Function: nutrition-related ADLs and IADLs  Anthropometric Measurements: weight, height/length, weight change, body mass index  Biochemical Data, Medical Tests and Procedures: electrolyte and renal panel, gastrointestinal profile, glucose/endocrine profile, lipid profile, inflammatory profile  Nutrition-Focused Physical Findings: overall appearance     Nutrition Follow-Up    RD Follow-up?: Yes     Aston GARRISON

## 2022-08-12 NOTE — CONSULTS
[facility]  Palliative Medicine  Consult Note    Patient Name: Linda Pierson  MRN: [id number]  Admission Date: 8/11/2022  Hospital Length of Stay: 1 days  Code Status: Full Code   Attending Provider: SALAS Davis MD  Consulting Provider: Juanita Hood MD  Primary Care Physician: Primary Doctor No  Principal Problem:Pelvic mass    Patient information was obtained from patient, spouse/SO, relative(s) and primary team.      Inpatient consult to Palliative Care  Consult performed by: Juanita Hood MD  Consult ordered by: Ryland Grey MD        Assessment/Plan:     Palliative care encounter  77 year old female with large pelvic mass of unknown etiology involving the rectum, vaginal cuff, and bladder. Biopsies thus far have been inconclusive. Palliative consulted for GOC    Code status: Full, not addressed     Surrogate decision maker: Legal NOK is patient's 6 children     GOC/ACP  -Met with patient, significant other, 1 son, and daughter in law at bedside. Patient too sleepy to participate. Introduced palliative medicine. Significant other shared their journey since mass was first discovered in May. Patient has had a gradual decline since this time with a sharp decline over the past month. Prior to this patient is described as very active and always on the go. Family understands she cannot undergo surgery at this time. Family is hoping that patient will get stronger with TPN and PT/OT. They are hopeful that a tissue dx can be obtained. They are open to continued discussions as more is known.     -Will continue to follow and have GOC discussions as appropriate. Hoping that patient will be able to engage in conversation on follow-up. Family would like to have a family meeting with all 6 children if any big decisions need to be made         Thank you for your consult. I will follow-up with patient. Please contact us if you have any additional questions.    Subjective:     HPI:   77 y.o. with history  of Afib, Aortic stenosis, MO, with history of Stage IAG1 endometrial cancer s/p robotic hysterectomy and staging 2016. Pt now with large pelvic mass involving the rectum, bladder and vaginal cuff. Multiple biopsies have been inconclusive. Patient has had significant functional decline over the past month to the point of being bedbound. Appetite has declined. Concern that patient is too high risk for exenteration. Primary team working on improving functional status. Palliative consulted for [facility]      Hospital Course:  No notes on file        Past Medical History:   Diagnosis Date    Anemia     Aortic stenosis     Hypertension     Paroxysmal atrial fibrillation     Sleep apnea     Uterine cancer        Past Surgical History:   Procedure Laterality Date    [place]    [facility]    BILATERAL SALPINGO[place]) [place]     [place]  1967    [place]    [place]    [facility] [place]    [place] of [place]    [place]    [employer] ([place]    [place]    [place]    KNEE SURGERY Right 2004    [employer], ([place]    KNEE SURGERY Left 2013    [employer] ([place]       Review of patient's allergies indicates:  No Known Allergies    Medications:  Continuous Infusions:   dextrose 10 % in water (D10W)      dextrose 5 % and 0.9 % NaCl 20 mL/hr at 22 0740    TPN ADULT CENTRAL LINE CUSTOM 42 mL/hr at 22 2251    TPN ADULT CENTRAL LINE CUSTOM       Scheduled Meds:   aspirin  81 mg Oral Daily    enoxaparin  1 mg/kg Subcutaneous BID    fat emulsion 20%  250 mL Intravenous Daily    metoprolol succinate  25 mg Oral Daily     piperacillin-tazobactam (ZOSYN) IVPB  4.5 g Intravenous Q8H    sodium chloride 0.9%  10 mL Intravenous Q6H     PRN Meds:acetaminophen, dextrose 10 % in water (D10W), dextrose 10%, dextrose 10%, glucagon (human recombinant), loperamide, melatonin, naloxone, ondansetron, oxyCODONE, sodium chloride 0.9%, sodium chloride 0.9%, Flushing PICC Protocol **AND** sodium chloride 0.9% **AND** sodium chloride 0.9%    Family History    None       Tobacco Use    Smoking status: Never Smoker    Smokeless tobacco: Never Used   Substance and Sexual Activity    Alcohol use: Not Currently    Drug use: Not Currently    Sexual activity: Not Currently       Review of Systems   Unable to perform ROS: Other  Patient too sleepy to answer questions   Objective:     Vital Signs (Most Recent):  Temp: 97.9 °F (36.6 °C) (08/12/22 1608)  Pulse: 106 (08/12/22 1608)  Resp: 16 (08/12/22 1608)  BP: (!) 101/58 (08/12/22 1608)  SpO2: (!) 92 % (08/12/22 1608)   Vital Signs (24h Range):  Temp:  [97.4 °F (36.3 °C)-98.4 °F (36.9 °C)] 97.9 °F (36.6 °C)  Pulse:  [] 106  Resp:  [16-20] 16  SpO2:  [92 %-96 %] 92 %  BP: ()/(52-63) 101/58     Weight: (!) 149.7 kg (330 lb)  Body mass index is 54.91 kg/m².    Physical Exam  Vitals and nursing note reviewed.   Constitutional:       General: She is not in acute distress.     Appearance: She is ill-appearing.      Comments: Sleepy. Easily wakes up but goes right back to sleep   HENT:      Head: Normocephalic.      Right Ear: External ear normal.      Left Ear: External ear normal.      Nose: Nose normal.      Mouth/Throat:      Mouth: Mucous membranes are moist.   Eyes:      Pupils: Pupils are equal, round, and reactive to light.   Cardiovascular:      Rate and Rhythm: Tachycardia present.   Pulmonary:      Effort: Pulmonary effort is normal. No respiratory distress.   Abdominal:      General: There is no distension.   Musculoskeletal:      Cervical back: Normal range of motion.      Right lower  leg: Edema present.      Left lower leg: Edema present.       Review of Symptoms      Symptom Assessment (ESAS 0-10 Scale)  Unable to complete assessment due to Other     CAM / Delirium:  Negative  Constipation:  Negative  Diarrhea:  Positive      Bowel Management Plan (BMP):  Yes      Pain Assessment in Advanced Demential Scale (PAINAD)   Breathing - Independent of vocalization:  0  Negative vocalization:  0  Facial expression:  0  Body language:  0  Consolability:  0  Total:  0    Modified Nafisa Scale:  0    ECOG Performance Status rdGrdrrdarddrderd:rd rd3rd Living Arrangement: Lives with significant other.    Psychosocial/Cultural: Pt has been with significant other for many years. Pt has 6 children. Enjoys playing Drik and traveling in her RV    Spiritual:  F - Carlee and Belief:  Gnosticist    Patient too sleepy to answer ESAS       Advance Care Planning   Advance Directives:   Living Will: No    LaPOST: No    Do Not Resuscitate Status: No    Medical Power of : No      Decision Making:  Family answered questions       Significant Labs: All pertinent labs within the past 24 hours have been reviewed.  CBC:   Recent Labs   Lab 08/12/22  0406   WBC 24.50*   HGB 8.1*   HCT 25.7*   MCV 88        BMP:  Recent Labs   Lab 08/12/22  0406   GLU 99   *   K 3.5      CO2 18*   BUN 51*   CREATININE 0.8   CALCIUM 7.4*   MG 2.4     LFT:  Lab Results   Component Value Date    AST 14 08/12/2022    ALKPHOS 230 (H) 08/12/2022    BILITOT 0.2 08/12/2022     Albumin:   Albumin   Date Value Ref Range Status   08/12/2022 1.2 (L) 3.5 - 5.2 g/dL Final     Protein:   Total Protein   Date Value Ref Range Status   08/12/2022 4.5 (L) 6.0 - 8.4 g/dL Final     Lactic acid:   Lab Results   Component Value Date    LACTATE 2.0 08/11/2022    LACTATE 1.8 08/10/2022       Significant Imaging: I have reviewed all pertinent imaging results/findings within the past 24 hours.    CT abdomen/pelvis 6/22  Impression:     1. Large, round  heterogeneous soft tissue mass centered within the expected region of the vaginal cuff and likely distending the vagina.  Difficult to delineate fat planes, but appears to compress or directly invade the rectum.  Primary rectal neoplasm thought less likely given patient history.  Recommend correlation with previous sigmoidoscopy.  Additional mass effect on the bladder without definite invasion.  No suspicious lymphadenopathy.  2. Multiple ill-defined hypodensities in the liver.  Recommend further evaluation with contrast-enhanced MRI abdomen in order to exclude metastasis.  3. Bilateral hydronephrosis with distended urinary bladder, likely secondary to outlet obstruction from large pelvic mass.  4. Midline ventral abdominal hernia containing multiple loops of small bowel and mesentery without evidence of complication.  5. Hepatomegaly.  6. Additional findings as above.  This report was flagged in Epic as abnormal.        > 50% of 75 min visit spent in chart review, face to face discussion of goals of care,  symptom assessment, coordination of care and emotional support.    Juanita Hood MD  Palliative Medicine  [facility]

## 2022-08-12 NOTE — PT/OT/SLP EVAL
"Physical Therapy Co-Evaluation and Co-Treatment    Patient Name:  Linda Pierson   MRN:  [id number]  Admit Date: 8/11/2022  Admitting Diagnosis:  Pelvic mass   Length of Stay: 1 days  Recent Surgery: * No surgery found *      Recommendations:     Discharge Recommendations:  nursing facility, skilled   Discharge Equipment Recommendations: none   Barriers to discharge: Inaccessible home and Decreased caregiver support    Plan:     During this hospitalization, patient to be seen 3 x/week to address the identified rehab impairments via therapeutic activities, therapeutic exercises, neuromuscular re-education, wheelchair management/training and progress towards the established goals.  · Plan of Care Expires:  09/11/22  · Plan of Care Reviewed with: patient, family    Assessment:     Linda Pierson is a 77 y.o. female admitted with a medical diagnosis of Pelvic mass. Pt tolerated initial evaluation fairly on this date. Pt with limited evaluation on this date due to a combination of pt's current sizewise bed not able to descend low enough to accomodate pt's short stature and body habits as well as pt requiring max encouragement to minimally participate in EOB activity. Pt was able to reach ~50% of full upright sitting x 2 trials before requesting to return to supine. Pt presents with minimal activation of core and extremity musculature throughout. Pt reports recent "slip" at home where she slipped off the EOB to the floor. Pt is not safe to return home at current level at this time and is a high fall risk. Pt will continue to benefit from skilled PT services during this hospital admit to continue to improve transfer ability and efficiency as well as continue to progress pt's ambulation distance and cardiopulmonary endurance to maximize pt's functional independence and return to PLOF. Pt will benefit from SNF after discharge from acute services in order to continue to progress pt's strength, endurance, and balance to help pt " "maximize independence at or near PLOF.      Problem List: weakness, impaired endurance, impaired self care skills, impaired functional mobility, impaired balance, decreased upper extremity function, decreased lower extremity function, decreased safety awareness, pain, impaired skin, edema.  Rehab Prognosis: Fair; patient would benefit from acute skilled PT services to address these deficits and reach maximum level of function.      Goals:   Multidisciplinary Problems     Physical Therapy Goals        Problem: Physical Therapy    Goal Priority Disciplines Outcome Goal Variances Interventions   Physical Therapy Goal     PT, PT/OT Ongoing, Progressing     Description: Goals to be met by: 2022     Patient will increase functional independence with mobility by performin. Sit to stand transfer with Moderate Assistance  2. Bed to chair transfer with Maximum Assistance using slide board prn  3. Sitting at edge of bed x15 minutes with Supervision  4. Stand for 2 minutes with Maximum Assistance using LRAD  5. Lower extremity exercise program x30 reps per handout, with independence                     Subjective     RN notified prior to session. , daughter, son in law present upon PT entrance into room. Patient agreeable to PT evaluation.    Chief Complaint: No chief complaint on file.  Patient/Family Comments/goals: "I don't want to do this right now" "I'm too tired to sit up"  Pain/Comfort:  · Pain Rating 1: other (see comments) (generalized pain)  · Pain Addressed 1: Reposition, Distraction, Cessation of Activity    Social History:  Residence: lives with their spouse 1-story house with 0 YEMI and no HR. Pt's bathroom has a tub/shower.  Support available: family  Equipment Used: wheelchair, walker, rolling, bedside commode, slide board, bath bench  Equipment Owned (not using): None  Prior level of function: pt spends majority of time in lift chair at home. Pt reports her  helps her stand and take " "steps to wheelchair. Pt's daughters help her into the shower and assist with bathing.   Hand Dominance: right.   Work: no.   Drive: no.   Managing Medicines/Managing Home: no.     Patient reports they will have assistance from , family upon discharge.    Objective:     Additional staff present: OT; OT for co-evaluation due to patient's medical complexities requiring two skilled therapists in order to appropriately assess patient's functional deficits as well as ensure patient safety, accommodate for limited activity tolerance, and provide appropriate, skilled assistance to maximize functional potential during evaluation.    Patient found HOB elevated with: telemetry, peripheral IV, pressure relief boots, blood pressure cuff     General Precautions: Standard, Cardiac fall   Orthopedic Precautions:N/A   Braces: N/A   Body mass index is 54.91 kg/m².  Oxygen Device: Room Air  Vitals: BP (!) 99/52 (BP Location: Left arm, Patient Position: Lying)   Pulse 96   Temp 97.7 °F (36.5 °C) (Oral)   Resp 20   Ht 5' 5" (1.651 m)   Wt (!) 149.7 kg (330 lb)   SpO2 (!) 94%   BMI 54.91 kg/m²     Exams:  · Cognition:   · Alert and Cooperative  · AxOx3  · Command following: Follows multistep verbal commands  · Fluency: clear/fluent  · Hearing: Intact  · Vision:  Intact  · Skin Integrity: Visible skin intact and dry skin bilateral lower legs  · Postural Assessment: slouched posture, rounded shoulders and forward head  · Physical Exam:    Left UE Left LE Right UE Right LE   Edema absent present absent present   ROM AROM WFL AROM WFL AROM WFL AROM WFL   Modified Gaby Scale 0: No increase in muscle tone 0: No increase in muscle tone 0: No increase in muscle tone 0: No increase in muscle tone   Strength 3/5 3/5 3/5 3/5   Sensation intact to light touch intact to light touch intact to light touch intact to light touch   Coordination not tested due to strength or pain deficits not tested due to strength or pain deficits not " tested due to strength or pain deficits not tested due to strength or pain deficits     Outcome Measures:  AM-PAC 6 CLICK MOBILITY  Turning over in bed (including adjusting bedclothes, sheets and blankets)?: 2  Sitting down on and standing up from a chair with arms (e.g., wheelchair, bedside commode, etc.): 1  Moving from lying on back to sitting on the side of the bed?: 2  Moving to and from a bed to a chair (including a wheelchair)?: 1  Need to walk in hospital room?: 1  Climbing 3-5 steps with a railing?: 1  Basic Mobility Total Score: 8     Functional Mobility:    Bed Mobility:   · Rolling/Turning to Right: maximal assistance and 2 persons  · Scooting to HOB via supine bridge: total assistance and 2 persons  · Supine to Sit: total assistance and 2 persons; from Rt side of bed  · Scooting anteriorly to EOB to have both feet planted on floor: total assistance  · Sit to Supine: total assistance and 2 persons; to Rt side of bed    Sitting Balance at Edge of Bed:   Static Sitting Balance: Poor : unable to maintain balance and requires moderate to maximal assistance from individual or chair   Dynamic Sitting Balance: n/a   Assistance Level Required: Total Assistance   Time: 2 minutes   Postural deviations noted: slouched posture, rounded shoulders, forward head and poor midline awareness   Comments: Pt only able to reach 50% of upright 2/2 a combination of pt's bed as well as pt's lack of participation. Pt resistant to PT scooting Lt hip forward to improve balance and upright posture. No activation of core musculature noted. Pt required significant encouragement to remain in semi-seated position for 2 minutes. Pt attempting to lay posteriorly on bed and thus session ended and pt returned sit > supine safely.    Transfers/Gait: Deferred due to pt's performance with above listed functional mobility    Education:   Time provided for education, counseling and discussion of health disposition in regards to patient's  current status   All questions answered within PT scope of practice and to patient's satisfaction   PT role in POC to address current functional deficits   Pt educated on proper body mechanics, safety techniques, and energy conservation with PT facilitation and cueing throughout session   Whiteboard updated with therapist name and pt's current mobility status documented above     Pt would benefit from sizewise evolution bed with immerse mattress as it lowers completely to the floor. MD Grey notified and stated he would place order.    Patient left HOB elevated with all lines intact, call button in reach, RHN notified and family present.      History:     Past Medical History:   Diagnosis Date    Anemia     Aortic stenosis     Hypertension     Paroxysmal atrial fibrillation     Sleep apnea     Uterine cancer        Past Surgical History:   Procedure Laterality Date    [place]    [employer]    BILATERAL SALPINGO-[place]OPHORECT[place]     SECTION  1967    [facility] [place]    [place]    [place]    [facility] [place]    HERNIA REPAIR  1988    [place]oval [place] excess fat ([facility]    HERNIA REPAIR  2005    [employer] [place]    HERN[place]    HY[place]Y [place]ilateral [place]    KNEE SURGERY Right 2004    [employer], ([place]    KNEE SURGERY Left 2013    [employer], ([place]       No family history on file.    Social History     Socioeconomic History    Marital status: Single   Tobacco Use    Smoking status: Never Smoker    Smokeless tobacco: Never Used   Substance and Sexual Activity    Alcohol use: Not Currently    Drug use: Not Currently    Sexual activity: Not Currently       Time Tracking:     PT Received On: 22  PT Start Time:  1112     PT Stop Time: 1143  PT Total Time (min): 31 min     Billable Minutes: Evaluation 8 minutes and Therapeutic Activity 23 minutes    Gaye Wilson, PT, DPT  8/12/2022  Pager: [phone]

## 2022-08-12 NOTE — ASSESSMENT & PLAN NOTE
77F PMH endometrial cancer Stage 1A grade 1 s/p TLH/BSO (2016), she now presents with a pelvic mass with compression of rectosigmoid colon.  Multiple biopsies have been performed including rectal biopsies of the mass resulting as necrotic and inflammatory tissue and vaginal cuff biopsy without any cancerous cells identified.  She is severely malnourished and debilitated with an albumin of 1.4 and prealbumin of 8 on admission.  Has A-fib (previously on Eliquis) last echo 8/11/22 EF 55-60%.    Plan:  - Malnutrition: Regular diet, Boost TID, TPN; Weekly prealbumin, INR, and triglycerides  - Urology consulted for evaluation of bladder and possible assistance if exenteration is needed  - Gyn onc consulted for assistance is needed in OR  - Echo EF 55-60%  - DVT US ordered and pending  - Continue zosyn  - Crump to be replaced by urology after procedure, will keep crump  - DVT ppx: hep gtt  - Continue hep gtt in setting of a. Fib history  - PT/OT to work with patient in setting of deconditioned state  - Will continue discussions with family as data is gathered in terms of goals of care for this patient and what operative intervention would look like

## 2022-08-12 NOTE — HPI
77 y.o. with history of Afib, Aortic stenosis, MO, with history of Stage IAG1 endometrial cancer s/p robotic hysterectomy and staging 9/2016. Pt now with large pelvic mass involving the rectum, bladder and vaginal cuff. Multiple biopsies have been inconclusive. Patient has had significant functional decline over the past month to the point of being bedbound. Appetite has declined. Concern that patient is too high risk for exenteration. Primary team working on improving functional status. Palliative consulted for C

## 2022-08-12 NOTE — SUBJECTIVE & OBJECTIVE
Subjective:     Interval History: NAEON.  Received PICC yesterday and started on TPN.  Tolerating diet without nausea or emesis.  Having multiple bowel movements that are liquid in consistency overnight.  C. Diff negative.  Making good urine.  Urology to perform cystoscopy to evaluate for bladder involvement.  Gyn onc on board for possible surgical intervention if needed.  Remains HDS, afebrile, and on room air.    Post-Op Info:  * No surgery found *          Medications:  Continuous Infusions:   dextrose 10 % in water (D10W)      dextrose 5 % and 0.9 % NaCl 100 mL/hr at 08/11/22 0916    heparin (porcine) in D5W 12 Units/kg/hr (08/12/22 0532)    TPN ADULT CENTRAL LINE CUSTOM 42 mL/hr at 08/11/22 2251    TPN ADULT CENTRAL LINE CUSTOM       Scheduled Meds:   aspirin  81 mg Oral Daily    fat emulsion 20%  250 mL Intravenous Daily    fat emulsion 20%  250 mL Intravenous Daily    metoprolol succinate  25 mg Oral Daily    piperacillin-tazobactam (ZOSYN) IVPB  4.5 g Intravenous Q8H    sodium chloride 0.9%  10 mL Intravenous Q6H     PRN Meds:   acetaminophen    dextrose 10 % in water (D10W)    dextrose 10%    dextrose 10%    glucagon (human recombinant)    heparin (PORCINE)    heparin (PORCINE)    loperamide    melatonin    naloxone    ondansetron    oxyCODONE    sodium chloride 0.9%    sodium chloride 0.9%    sodium chloride 0.9%        Objective:     Vital Signs (Most Recent):  Temp: 97.8 °F (36.6 °C) (08/12/22 0406)  Pulse: 91 (08/12/22 0406)  Resp: 18 (08/12/22 0406)  BP: (!) 101/57 (08/12/22 0406)  SpO2: 96 % (08/12/22 0406)   Vital Signs (24h Range):  Temp:  [97.4 °F (36.3 °C)-97.8 °F (36.6 °C)] 97.8 °F (36.6 °C)  Pulse:  [85-94] 91  Resp:  [16-18] 18  SpO2:  [95 %-96 %] 96 %  BP: ()/(56-66) 101/57     Intake/Output - Last 3 Shifts         08/10 0700  08/11 0659 08/11 0700 08/12 0659 08/12 0700 08/13 0659    P.O.  240     I.V. (mL/kg)  565.1 (3.8)     IV Piggyback  255.4     TPN  371.6     Total  Intake(mL/kg)  1432.1 (9.6)     Urine (mL/kg/hr)  1000 (0.3)     Stool  0     Total Output  1000     Net  +432.1            Stool Occurrence  7 x             Physical Exam  Constitutional:       Appearance: She is ill-appearing.   HENT:      Head: Normocephalic.      Nose: Nose normal.   Eyes:      General: No scleral icterus.     Pupils: Pupils are equal, round, and reactive to light.   Cardiovascular:      Rate and Rhythm: Normal rate and regular rhythm.      Heart sounds: Normal heart sounds.   Pulmonary:      Effort: Pulmonary effort is normal. No respiratory distress.      Breath sounds: Normal breath sounds. No wheezing or rales.   Abdominal:      Palpations: Abdomen is soft. There is mass.      Tenderness: There is no abdominal tenderness. There is no guarding.      Hernia: A hernia (large ventral hernia) is present.   Genitourinary:     Comments: Dickson in place  Musculoskeletal:         General: Normal range of motion.      Cervical back: Full passive range of motion without pain and neck supple.   Skin:     General: Skin is warm and dry.      Coloration: Skin is pale.   Neurological:      General: No focal deficit present.      Mental Status: She is alert.   Psychiatric:         Mood and Affect: Mood normal.         Behavior: Behavior normal.       Significant Labs:  BMP (Last 3 Results):   Recent Labs   Lab 08/10/22  1641 08/11/22  0601 08/12/22  0406   GLU  --  108 99   * 132* 133*   K 3.5 3.9 3.5    104 103   CO2 20 16* 18*   BUN 53* 57* 51*   CREATININE 0.96 0.9 0.8   CALCIUM 8.1* 8.1* 7.4*   MG  --   --  2.4     CBC (Last 3 Results):   Recent Labs   Lab 08/10/22  1641 08/11/22  0603   WBC 28.7* 29.05*  29.05*   RBC 3.57* 3.56*  3.56*   HGB 9.8* 9.7*  9.7*   HCT 30.9* 31.0*  31.0*   * 335  335   MCV 86.6 87  87   MCH 27.5* 27.2  27.2   MCHC 31.7 31.3*  31.3*     CMP (Last 3 Results):   Recent Labs   Lab 08/10/22  1641 08/11/22  0601 08/12/22  0406   GLU  --  108 99   CALCIUM  8.1* 8.1* 7.4*   ALBUMIN 2.6* 1.4* 1.2*   PROT  --  5.0* 4.5*   * 132* 133*   K 3.5 3.9 3.5   CO2 20 16* 18*    104 103   BUN 53* 57* 51*   CREATININE 0.96 0.9 0.8   ALKPHOS 246* 235* 230*   ALT 10 9* 10   AST 18 15 14   BILITOT  --  0.3 0.2     CRP (Last 3 Results):   Recent Labs   Lab 08/10/22  1641 08/11/22  0601   CRP 26.40* 276.9*     Coagulation:   Recent Labs   Lab 08/11/22  0806 08/11/22  1702 08/12/22  0104   LABPROT 11.0  --   --    INR 1.1  --   --    APTT >150.0*   < > >150.0*    < > = values in this interval not displayed.     LFTs:   Recent Labs   Lab 08/12/22  0406   ALT 10   AST 14   ALKPHOS 230*   BILITOT 0.2   PROT 4.5*   ALBUMIN 1.2*     Prealbumin (Last 3 Results):   Recent Labs   Lab 08/11/22  0805   PREALBUMIN 8*       Significant Diagnostics:  I have reviewed all pertinent imaging results/findings within the past 24 hours.

## 2022-08-12 NOTE — SUBJECTIVE & OBJECTIVE
Interval History: AF. Mildly hypotensive. UOP-/850/150.     Objective:     Temp:  [97.4 °F (36.3 °C)-97.8 °F (36.6 °C)] 97.8 °F (36.6 °C)  Pulse:  [85-94] 91  Resp:  [16-18] 18  SpO2:  [95 %-96 %] 96 %  BP: ()/(56-66) 101/57     Body mass index is 54.91 kg/m².           Drains       Drain  Duration                  Urethral Catheter 08/10/22 0000 Straight-tip 16 Fr. 2 days                    Physical Exam  Vitals reviewed.   Constitutional:       General: She is not in acute distress.     Appearance: She is well-developed. She is not diaphoretic.   HENT:      Head: Normocephalic and atraumatic.   Eyes:      General: No scleral icterus.  Cardiovascular:      Rate and Rhythm: Normal rate.   Pulmonary:      Effort: Pulmonary effort is normal. No respiratory distress.      Breath sounds: No stridor.   Abdominal:      Palpations: There is mass (Large, fixed abdominal mass from suprapubic area to above umbilicus).      Tenderness: There is no right CVA tenderness or left CVA tenderness.   Genitourinary:     Comments: Dickson catheter draining clear yellow urine  Musculoskeletal:      Cervical back: Normal range of motion.      Right lower leg: Edema present.      Left lower leg: Edema present.   Skin:     General: Skin is warm and dry.   Neurological:      Mental Status: She is alert.   Psychiatric:         Behavior: Behavior normal.       Significant Labs:    BMP:  Recent Labs   Lab 08/05/22  1328 08/10/22  1641 08/11/22  0601   * 131* 132*   K 3.6 3.5 3.9    100 104   CO2 20 20 16*   BUN 59* 53* 57*   CREATININE 1.40* 0.96 0.9   GLUCOSE 140* 112*  --    CALCIUM 7.7* 8.1* 8.1*       CBC:   Recent Labs   Lab 08/10/22  1641 08/11/22  0603   WBC 28.7* 29.05*  29.05*   HGB 9.8* 9.7*  9.7*   HCT 30.9* 31.0*  31.0*   * 335  335       All pertinent labs results from the past 24 hours have been reviewed.    Significant Imaging:  All pertinent imaging results/findings from the past 24 hours have been  reviewed.

## 2022-08-12 NOTE — NURSING
Pt had a critical aPTT result greater than 150. MD on call notified. Infusion stopped. Will continue as ordered on nomogram.

## 2022-08-12 NOTE — ASSESSMENT & PLAN NOTE
Linda Pierson is a 77 year old female with a history of endometrial cancer, AF (on eliquis), pelvic mass of quetionable origin presently admitted with weakness. Urology was consulted for questionable bladder involvement.  -bedside flexible cystoscopy to assess tumor burden. Will replace crump   - monitor and record UOP  - Cr improving with catheter placement, patient may benefit from bilateral nephrostomy tube placement given report of persistent bilateral hydro in the setting of large pelvic mass  - maintain crump catheter

## 2022-08-12 NOTE — PROGRESS NOTES
[facility]  Colorectal Surgery  Progress Note    Patient Name: Linda Pierson  MRN: [id number]  Admission Date: 8/11/2022  Hospital Length of Stay: 1 days  Attending Physician: SALAS Davis MD    Subjective:     Interval History: NAEON.  Received PICC yesterday and started on TPN.  Tolerating diet without nausea or emesis.  Having multiple bowel movements that are liquid in consistency overnight.  C. Diff negative.  Making good urine.  Urology to perform cystoscopy to evaluate for bladder involvement.  Gyn onc on board for possible surgical intervention if needed.  Remains HDS, afebrile, and on room air.    Post-Op Info:  * No surgery found *          Medications:  Continuous Infusions:   dextrose 10 % in water (D10W)      dextrose 5 % and 0.9 % NaCl 100 mL/hr at 08/11/22 0916    heparin (porcine) in D5W 12 Units/kg/hr (08/12/22 0532)    TPN ADULT CENTRAL LINE CUSTOM 42 mL/hr at 08/11/22 2251    TPN ADULT CENTRAL LINE CUSTOM       Scheduled Meds:   aspirin  81 mg Oral Daily    fat emulsion 20%  250 mL Intravenous Daily    fat emulsion 20%  250 mL Intravenous Daily    metoprolol succinate  25 mg Oral Daily    piperacillin-tazobactam (ZOSYN) IVPB  4.5 g Intravenous Q8H    sodium chloride 0.9%  10 mL Intravenous Q6H     PRN Meds:   acetaminophen    dextrose 10 % in water (D10W)    dextrose 10%    dextrose 10%    glucagon (human recombinant)    heparin (PORCINE)    heparin (PORCINE)    loperamide    melatonin    naloxone    ondansetron    oxyCODONE    sodium chloride 0.9%    sodium chloride 0.9%    sodium chloride 0.9%        Objective:     Vital Signs (Most Recent):  Temp: 97.8 °F (36.6 °C) (08/12/22 0406)  Pulse: 91 (08/12/22 0406)  Resp: 18 (08/12/22 0406)  BP: (!) 101/57 (08/12/22 0406)  SpO2: 96 % (08/12/22 0406)   Vital Signs (24h Range):  Temp:  [97.4 °F (36.3 °C)-97.8 °F (36.6 °C)] 97.8 °F (36.6 °C)  Pulse:  [85-94] 91  Resp:  [16-18] 18  SpO2:  [95 %-96 %] 96 %  BP:  ()/(56-66) 101/57     Intake/Output - Last 3 Shifts         08/10 0700 08/11 0659 08/11 0700 08/12 0659 08/12 0700 08/13 0659    P.O.  240     I.V. (mL/kg)  565.1 (3.8)     IV Piggyback  255.4     TPN  371.6     Total Intake(mL/kg)  1432.1 (9.6)     Urine (mL/kg/hr)  1000 (0.3)     Stool  0     Total Output  1000     Net  +432.1            Stool Occurrence  7 x             Physical Exam  Constitutional:       Appearance: She is ill-appearing.   HENT:      Head: Normocephalic.      Nose: Nose normal.   Eyes:      General: No scleral icterus.     Pupils: Pupils are equal, round, and reactive to light.   Cardiovascular:      Rate and Rhythm: Normal rate and regular rhythm.      Heart sounds: Normal heart sounds.   Pulmonary:      Effort: Pulmonary effort is normal. No respiratory distress.      Breath sounds: Normal breath sounds. No wheezing or rales.   Abdominal:      Palpations: Abdomen is soft. There is mass.      Tenderness: There is no abdominal tenderness. There is no guarding.      Hernia: A hernia (large ventral hernia) is present.   Genitourinary:     Comments: Dickson in place  Musculoskeletal:         General: Normal range of motion.      Cervical back: Full passive range of motion without pain and neck supple.   Skin:     General: Skin is warm and dry.      Coloration: Skin is pale.   Neurological:      General: No focal deficit present.      Mental Status: She is alert.   Psychiatric:         Mood and Affect: Mood normal.         Behavior: Behavior normal.       Significant Labs:  BMP (Last 3 Results):   Recent Labs   Lab 08/10/22  1641 08/11/22  0601 08/12/22  0406   GLU  --  108 99   * 132* 133*   K 3.5 3.9 3.5    104 103   CO2 20 16* 18*   BUN 53* 57* 51*   CREATININE 0.96 0.9 0.8   CALCIUM 8.1* 8.1* 7.4*   MG  --   --  2.4     CBC (Last 3 Results):   Recent Labs   Lab 08/10/22  1641 08/11/22  0603   WBC 28.7* 29.05*  29.05*   RBC 3.57* 3.56*  3.56*   HGB 9.8* 9.7*  9.7*   HCT  30.9* 31.0*  31.0*   * 335  335   MCV 86.6 87  87   MCH 27.5* 27.2  27.2   MCHC 31.7 31.3*  31.3*     CMP (Last 3 Results):   Recent Labs   Lab 08/10/22  1641 08/11/22  0601 08/12/22  0406   GLU  --  108 99   CALCIUM 8.1* 8.1* 7.4*   ALBUMIN 2.6* 1.4* 1.2*   PROT  --  5.0* 4.5*   * 132* 133*   K 3.5 3.9 3.5   CO2 20 16* 18*    104 103   BUN 53* 57* 51*   CREATININE 0.96 0.9 0.8   ALKPHOS 246* 235* 230*   ALT 10 9* 10   AST 18 15 14   BILITOT  --  0.3 0.2     CRP (Last 3 Results):   Recent Labs   Lab 08/10/22  1641 08/11/22  0601   CRP 26.40* 276.9*     Coagulation:   Recent Labs   Lab 08/11/22  0806 08/11/22  1702 08/12/22  0104   LABPROT 11.0  --   --    INR 1.1  --   --    APTT >150.0*   < > >150.0*    < > = values in this interval not displayed.     LFTs:   Recent Labs   Lab 08/12/22  0406   ALT 10   AST 14   ALKPHOS 230*   BILITOT 0.2   PROT 4.5*   ALBUMIN 1.2*     Prealbumin (Last 3 Results):   Recent Labs   Lab 08/11/22  0805   PREALBUMIN 8*       Significant Diagnostics:  I have reviewed all pertinent imaging results/findings within the past 24 hours.    Assessment/Plan:     * Pelvic mass  77F PMH endometrial cancer Stage 1A grade 1 s/p TLH/BSO (2016), she now presents with a pelvic mass with compression of rectosigmoid colon.  Multiple biopsies have been performed including rectal biopsies of the mass resulting as necrotic and inflammatory tissue and vaginal cuff biopsy without any cancerous cells identified.  She is severely malnourished and debilitated with an albumin of 1.4 and prealbumin of 8 on admission.  Has A-fib (previously on Eliquis) last echo 8/11/22 EF 55-60%.    Plan:  - Malnutrition: Regular diet, Boost TID, TPN; Weekly prealbumin, INR, and triglycerides  - Urology consulted for evaluation of bladder and possible assistance if exenteration is needed  - Gyn onc consulted for assistance is needed in OR  - Echo EF 55-60%  - DVT US ordered and pending  - Continue zosyn  -  Crump to be replaced by urology after procedure, will keep crump  - DVT ppx: hep gtt  - Continue hep gtt in setting of a. Fib history  - PT/OT to work with patient in setting of deconditioned state  - Will continue discussions with family as data is gathered in terms of goals of care for this patient and what operative intervention would look like          Ryland Grey MD  Colorectal Surgery  [facility]

## 2022-08-12 NOTE — PROGRESS NOTES
S:  Doing well this morning. Pain improved. Had a good night sleep. 1 bowel movement overnight.     O:  Temp:  [97.4 °F (36.3 °C)-97.8 °F (36.6 °C)] 97.8 °F (36.6 °C)  Pulse:  [85-94] 91  Resp:  [16-18] 18  SpO2:  [95 %-96 %] 96 %  BP: ()/(56-66) 101/57  No acute distress  Abdomen soft, non-tender    A/P:  - s/p RA-TLH/BSO in 2016 in the setting of Stage IAI Endometrial Cancer  - Pelvic mass of unknown etiology now with concern for invasion into bladder, rectum, and possibly vagina  - Evaluated by staff, Gynecology Oncology available for surgical intervention as planned per primary team  - Agree with medical optimization before surgery attempted due to patient's multiple co morbidities and clinical status  - Thank you for your consult. We will continue to follow along. Please page [phone] with any concerns.

## 2022-08-12 NOTE — PROGRESS NOTES
[facility]  Urology  Progress Note    Patient Name: Linda Pierson  MRN: [id number]  Admission Date: 8/11/2022  Hospital Length of Stay: 1 days  Code Status: Full Code   Attending Provider: SALAS Davis MD   Primary Care Physician: Primary Doctor No    Subjective:     HPI:  Linda Pierson is a 77 year old female with a history of endometrial cancer, AF (on eliquis), pelvic mass of quetionable origin presently admitted with weakness. Urology was consulted for questionable bladder involvement.    History per patient and chart. Per patient, she had a recent fall secondary to increased weakness. Denies any fevers, chills. Denies nausea, vomiting. Patient with a history of a pelvic mass of unknown origin. Had a prior biopsy mostly necrotic. Her recent imaging is not presently available for review, but per report there has been interval growth of her mass with questionable involvement of the bladder. Patient is a high risk surgical candidate and Dr. Davis requested urologic evaluation to determine extent of resection in order to adequately  patient and family.    On assessment, patient is AF, HDS. Leukocytosis to 29, Cr 0.9 (baseline 0.9) appears to have improved with crump placement at OSH. UA LE positive, nitrite negative. Microscopy with 12 WBC, trace bacteria. Prior CT reviewed which shows bilateral mild hydronephrosis and hydroureter down to the level of a widely distended bladder. There is a large pelvic mass posterior to the bladder with questionable involvement of the bladder. Per CT read from yesterday, interval growth of pelvic mass with new direct extension into rectum and sigmoid colon as well as stable appearing bilateral hydronephrosis.       Interval History: AF. Mildly hypotensive. UOP-/850/150.     Objective:     Temp:  [97.4 °F (36.3 °C)-97.8 °F (36.6 °C)] 97.8 °F (36.6 °C)  Pulse:  [85-94] 91  Resp:  [16-18] 18  SpO2:  [95 %-96 %] 96 %  BP: ()/(56-66) 101/57     Body mass index  is 54.91 kg/m².           Drains       Drain  Duration                  Urethral Catheter 08/10/22 0000 Straight-tip 16 Fr. 2 days                    Physical Exam  Vitals reviewed.   Constitutional:       General: She is not in acute distress.     Appearance: She is well-developed. She is not diaphoretic.   HENT:      Head: Normocephalic and atraumatic.   Eyes:      General: No scleral icterus.  Cardiovascular:      Rate and Rhythm: Normal rate.   Pulmonary:      Effort: Pulmonary effort is normal. No respiratory distress.      Breath sounds: No stridor.   Abdominal:      Palpations: There is mass (Large, fixed abdominal mass from suprapubic area to above umbilicus).      Tenderness: There is no right CVA tenderness or left CVA tenderness.   Genitourinary:     Comments: Dickson catheter draining clear yellow urine  Musculoskeletal:      Cervical back: Normal range of motion.      Right lower leg: Edema present.      Left lower leg: Edema present.   Skin:     General: Skin is warm and dry.   Neurological:      Mental Status: She is alert.   Psychiatric:         Behavior: Behavior normal.       Significant Labs:    BMP:  Recent Labs   Lab 08/05/22  1328 08/10/22  1641 08/11/22  0601   * 131* 132*   K 3.6 3.5 3.9    100 104   CO2 20 20 16*   BUN 59* 53* 57*   CREATININE 1.40* 0.96 0.9   GLUCOSE 140* 112*  --    CALCIUM 7.7* 8.1* 8.1*       CBC:   Recent Labs   Lab 08/10/22  1641 08/11/22  0603   WBC 28.7* 29.05*  29.05*   HGB 9.8* 9.7*  9.7*   HCT 30.9* 31.0*  31.0*   * 335  335       All pertinent labs results from the past 24 hours have been reviewed.    Significant Imaging:  All pertinent imaging results/findings from the past 24 hours have been reviewed.                    Assessment/Plan:     Hydronephrosis  Linda Pierson is a 77 year old female with a history of endometrial cancer, AF (on eliquis), pelvic mass of quetionable origin presently admitted with weakness. Urology was consulted for  questionable bladder involvement.  -bedside flexible cystoscopy to assess tumor burden. Will replace crump   - monitor and record UOP  - Cr improving with catheter placement, patient may benefit from bilateral nephrostomy tube placement given report of persistent bilateral hydro in the setting of large pelvic mass  - maintain crump catheter        VTE Risk Mitigation (From admission, onward)         Ordered     IP VTE HIGH RISK PATIENT  Once         08/11/22 0646     heparin 25,000 units in dextrose 5% (100 units/ml) IV bolus from bag - ADDITIONAL PRN BOLUS - 60 units/kg  As needed (PRN)        Question:  Heparin Infusion Adjustment (DO NOT MODIFY ANSWER)  Answer:  \\[email or web address]\epic\Images\Pharmacy\HeparinInfusions\heparin HIGH INTENSITY nomogram for [facility] [id number]    08/11/22 0523     heparin 25,000 units in dextrose 5% (100 units/ml) IV bolus from bag - ADDITIONAL PRN BOLUS - 30 units/kg  As needed (PRN)        Question:  Heparin Infusion Adjustment (DO NOT MODIFY ANSWER)  Answer:  \\[email or web address]\epic\Images\Pharmacy\HeparinInfusions\heparin HIGH INTENSITY nomogram for [facility] [id number]    08/11/22 0523     heparin 25,000 units in dextrose 5% 250 mL (100 units/mL) infusion HIGH INTENSITY nomogram - [facility]  Continuous        Question Answer Comment   Heparin Infusion Adjustment (DO NOT MODIFY ANSWER) \\[email or web address]\epic\Images\Pharmacy\HeparinInfusions\heparin HIGH INTENSITY nomogram for [facility] [id number]    Begin at (in units/kg/hr) 18        08/11/22 0523     Place sequential compression device  Until discontinued         08/11/22 0358                GONZALO KUMAR MD  Urology  [facility]

## 2022-08-12 NOTE — PT/OT/SLP EVAL
Occupational Therapy   Co-Evaluation and Co-Treat  Co-treatment with PT for maximal pt participation, safety, and activity tolerance       Name: Linda Pierson  MRN: [id number]  Admitting Diagnosis:  Pelvic mass  Recent Surgery: * No surgery found *      Recommendations:     Discharge Recommendations: nursing facility, skilled  Discharge Equipment Recommendations:     Barriers to discharge:  Inaccessible home environment, Decreased caregiver support    Assessment:     Linda Pierson is a 77 y.o. female with a medical diagnosis of Pelvic mass.  She presents with impaired ADL and mobility performance deficits. Pt found upright and requiring encouragement to participate with pt's family present and supportive. Pt limited 2/2 Sizewise bed configuration not suitable for pt's short stature with pt demonstrating poor tolerance at EOB not achieving full EOB during this evaluation despite max attempts. RN present and assisting however pt still continued to tolerate 50% of supine<>sit positioning prior to pt being returned to bed. Pt required max A x2-total A x2 for this attempt with pt engaging UEs only. Limited activation of BLEs and core noted as pt showing current debility and is a high fall risk. PTA, pt was living with her spouse in a [facility]. She reports she was typically able to have spouse A in pivot transfer to /c where her  would propel pt in home. Due to diagnoses, pt was finding ADLs to become increasingly difficult (specifically LB dressing and toileting incontinence). At this time, pt is a high fall risk and not safe to return home.     Pt would benefit from continued OT skilled services 3x/wk to improve daily living skills to optimize QOL.Pt is recommended to discharge to SNF at this time.  Performance deficits affecting function: weakness, gait instability, impaired endurance, impaired balance, impaired self care skills, impaired functional mobility, pain, impaired skin, edema, decreased lower extremity  "function, decreased upper extremity function.      Rehab Prognosis: Good; patient would benefit from acute skilled OT services to address these deficits and reach maximum level of function.       Plan:     Patient to be seen 3 x/week to address the above listed problems via self-care/home management, therapeutic activities, therapeutic exercises  · Plan of Care Expires: 09/12/22  · Plan of Care Reviewed with: patient    Subjective     Chief Complaint: not wanting to mobilize; fatigue after imaging  Patient/Family Comments/goals: "I don't want to do this right now"    Occupational Profile:  Living Environment: Pt lives with her spouse in a [facility] with no YEMI. Pt not mobilizing to restroom at this time, going in brief. Completing bathing with shower bench in shower/tub combo with daughters when they visit.  Previous level of function: A for ADLs and mobility. Pt uses a w/c and A to pivot to chair with spouse A.  Roles and Routines: Pt does not drive; mother and spouse  Equipment Used at Home:  walker, rolling, wheelchair, bedside commode, slide board, other (see comments) (shower bench not using)  Assistance upon Discharge: spouse    Pain/Comfort:  · Pain Rating 1: other (see comments) (global pain with bed level mobility)  · Location - Orientation 1: generalized  · Pain Addressed 1: Reposition, Distraction, Cessation of Activity    Patients cultural, spiritual, Hindu conflicts given the current situation: no    Objective:     Communicated with: RN prior to session.  Patient found HOB elevated with telemetry, peripheral IV, pressure relief boots, blood pressure cuff upon OT entry to room.    General Precautions: Standard, fall   Orthopedic Precautions:N/A   Braces: N/A  Respiratory Status: Room air    Occupational Performance:    Bed Mobility:    · Patient completed Rolling/Turning to Right with maximal assistance and 2 persons  · Patient completed Scooting/Bridging with total assistance and 2 persons  · Patient " completed Supine to Sit with maximal assistance and 2 persons  · Patient completed Sit to Supine with total assistance and 2 persons    Functional Mobility/Transfers:  · Functional Mobility: (Supine><Sit) attempted however pt achieved 50% of transfer prior to sliding 2/2 poor positioning in bed with posterior lean from scapular and head retraction. No core activation and no BLE activation during trial     Activities of Daily Living:  · Upper Body Dressing: total assistance adjusting gown at EOB   · Lower Body Dressing: total assistance donning socks/adjustment     Cognitive/Visual Perceptual:  Cognitive/Psychosocial Skills:     -       Oriented to: Person, Place, Time and Situation   -       Follows Commands/attention:Follows multistep  commands  -       Communication: clear/fluent  -       Memory: No Deficits noted  -       Safety awareness/insight to disability: intact   -       Mood/Affect/Coping skills/emotional control: Appropriate to situation    Physical Exam:  Balance:    -       no core activation/ poor   Upper Extremity Range of Motion:     -       Right Upper Extremity: WNL  -       Left Upper Extremity: WNL  Upper Extremity Strength:    -       Right Upper Extremity: WNL  -       Left Upper Extremity: WNL   Strength:    -       Right Upper Extremity: WNL  -       Left Upper Extremity: WNL    AMPAC 6 Click ADL:  AMPAC Total Score: 9    Treatment & Education:  Pt educated on role of occupational therapy, POC, and safety during ADLs and functional mobility. Pt and OT discussed importance of safe, continued mobility to optimize daily living skills. Pt verbalized understanding. White board updated during session. Pt given instruction to call for medical staff/nurse for assistance.     Education:    Patient left HOB elevated with all lines intact, call button in reach, RN notified and family present    GOALS:   Multidisciplinary Problems     Occupational Therapy Goals        Problem: Occupational Therapy     Goal Priority Disciplines Outcome Interventions   Occupational Therapy Goal     OT, PT/OT Ongoing, Progressing    Description: Goals to be met by: 2022 (1 month)     Patient will increase functional independence with ADLs by performing:    UE Dressing with Minimal Assistance.  Grooming while seated at EOB with Set-up Assistance.  Toileting from bedside commode with Moderate Assistance for hygiene and clothing management.   Sitting at edge of bed x10 minutes with Minimal Assistance.  Rolling to Bilateral with Minimal Assistance.   Supine to sit with Minimal Assistance.  Stand pivot transfers with Maximum Assistance.  Step transfer with Maximum Assistance  Toilet transfer to bedside commode with Maximum Assistance.                         History:     Past Medical History:   Diagnosis Date    Anemia     Aortic stenosis     Hypertension     Paroxysmal atrial fibrillation     Sleep apnea     Uterine cancer        Past Surgical History:   Procedure Laterality Date    [place]EN[place]CTOMY  1991    [facility], [place]    BILATERAL SALPINGO-OOPHORECT[place]     SECTION  1967    [facility], [place], [place]    GALLBLADDER SURGERY      [facility], [place], [place]    [facility] [place]    HERNIA REPAIR  1988    Removal of excess [place], [place]    [place]ERN[place] REPA[place]  2005    [employer] ([place]    [facility]ERN[place] REPA[place]  2018    [facility], [place]    HY[place]    KNEE SURGERY Right 2004    [employer], ([place], [place])    KNEE SURGERY Left 2013    [employer], ([place]       Time Tracking:     OT Date of Treatment: 22  OT Start Time: 1112  OT Stop Time: 1137  OT Total Time (min): 25 min    Billable Minutes:Evaluation 15 min  Self Care/Home Management 10 min    2022

## 2022-08-12 NOTE — PLAN OF CARE
SW unable to assess pt at this time. Left VM with family.    Ophelia Castellano LCSW  Case Management/[facility]  [phone]

## 2022-08-13 LAB
ALBUMIN SERPL BCP-MCNC: 1.2 G/DL (ref 3.5–5.2)
ALP SERPL-CCNC: 207 U/L (ref 55–135)
ALT SERPL W/O P-5'-P-CCNC: 10 U/L (ref 10–44)
ANION GAP SERPL CALC-SCNC: 11 MMOL/L (ref 8–16)
APTT BLDCRRT: 33.7 SEC (ref 21–32)
AST SERPL-CCNC: 13 U/L (ref 10–40)
BILIRUB SERPL-MCNC: 0.3 MG/DL (ref 0.1–1)
BUN SERPL-MCNC: 47 MG/DL (ref 8–23)
CALCIUM SERPL-MCNC: 7.7 MG/DL (ref 8.7–10.5)
CHLORIDE SERPL-SCNC: 105 MMOL/L (ref 95–110)
CO2 SERPL-SCNC: 17 MMOL/L (ref 23–29)
CREAT SERPL-MCNC: 0.8 MG/DL (ref 0.5–1.4)
CRP SERPL-MCNC: 165.7 MG/L (ref 0–8.2)
EST. GFR  (NO RACE VARIABLE): >60 ML/MIN/1.73 M^2
GLUCOSE SERPL-MCNC: 118 MG/DL (ref 70–110)
MAGNESIUM SERPL-MCNC: 2.6 MG/DL (ref 1.6–2.6)
PHOSPHATE SERPL-MCNC: 3.3 MG/DL (ref 2.7–4.5)
POCT GLUCOSE: 124 MG/DL (ref 70–110)
POCT GLUCOSE: 131 MG/DL (ref 70–110)
POCT GLUCOSE: 145 MG/DL (ref 70–110)
POCT GLUCOSE: 147 MG/DL (ref 70–110)
POCT GLUCOSE: 153 MG/DL (ref 70–110)
POCT GLUCOSE: 157 MG/DL (ref 70–110)
POCT GLUCOSE: 159 MG/DL (ref 70–110)
POTASSIUM SERPL-SCNC: 4 MMOL/L (ref 3.5–5.1)
PROT SERPL-MCNC: 4.7 G/DL (ref 6–8.4)
SODIUM SERPL-SCNC: 133 MMOL/L (ref 136–145)
TRIGL SERPL-MCNC: 122 MG/DL (ref 30–150)

## 2022-08-13 PROCEDURE — 84478 ASSAY OF TRIGLYCERIDES: CPT | Performed by: COLON & RECTAL SURGERY

## 2022-08-13 PROCEDURE — 25000003 PHARM REV CODE 250

## 2022-08-13 PROCEDURE — A4216 STERILE WATER/SALINE, 10 ML: HCPCS | Performed by: COLON & RECTAL SURGERY

## 2022-08-13 PROCEDURE — A9585 GADOBUTROL INJECTION: HCPCS | Performed by: COLON & RECTAL SURGERY

## 2022-08-13 PROCEDURE — 63600175 PHARM REV CODE 636 W HCPCS

## 2022-08-13 PROCEDURE — B4185 PARENTERAL SOL 10 GM LIPIDS: HCPCS

## 2022-08-13 PROCEDURE — 63600175 PHARM REV CODE 636 W HCPCS: Performed by: STUDENT IN AN ORGANIZED HEALTH CARE EDUCATION/TRAINING PROGRAM

## 2022-08-13 PROCEDURE — 85730 THROMBOPLASTIN TIME PARTIAL: CPT | Performed by: COLON & RECTAL SURGERY

## 2022-08-13 PROCEDURE — 25000003 PHARM REV CODE 250: Performed by: COLON & RECTAL SURGERY

## 2022-08-13 PROCEDURE — 25000003 PHARM REV CODE 250: Performed by: STUDENT IN AN ORGANIZED HEALTH CARE EDUCATION/TRAINING PROGRAM

## 2022-08-13 PROCEDURE — 63600175 PHARM REV CODE 636 W HCPCS: Performed by: COLON & RECTAL SURGERY

## 2022-08-13 PROCEDURE — 83735 ASSAY OF MAGNESIUM: CPT | Performed by: STUDENT IN AN ORGANIZED HEALTH CARE EDUCATION/TRAINING PROGRAM

## 2022-08-13 PROCEDURE — 86140 C-REACTIVE PROTEIN: CPT | Performed by: STUDENT IN AN ORGANIZED HEALTH CARE EDUCATION/TRAINING PROGRAM

## 2022-08-13 PROCEDURE — 84100 ASSAY OF PHOSPHORUS: CPT | Performed by: STUDENT IN AN ORGANIZED HEALTH CARE EDUCATION/TRAINING PROGRAM

## 2022-08-13 PROCEDURE — 80053 COMPREHEN METABOLIC PANEL: CPT | Performed by: STUDENT IN AN ORGANIZED HEALTH CARE EDUCATION/TRAINING PROGRAM

## 2022-08-13 PROCEDURE — A4217 STERILE WATER/SALINE, 500 ML: HCPCS

## 2022-08-13 PROCEDURE — 25500020 PHARM REV CODE 255: Performed by: COLON & RECTAL SURGERY

## 2022-08-13 PROCEDURE — 20600001 HC STEP DOWN PRIVATE ROOM

## 2022-08-13 RX ORDER — MUPIROCIN 20 MG/G
OINTMENT TOPICAL 2 TIMES DAILY
Status: COMPLETED | OUTPATIENT
Start: 2022-08-13 | End: 2022-08-17

## 2022-08-13 RX ORDER — GADOBUTROL 604.72 MG/ML
10 INJECTION INTRAVENOUS
Status: COMPLETED | OUTPATIENT
Start: 2022-08-13 | End: 2022-08-13

## 2022-08-13 RX ORDER — CALCIUM CARBONATE 200(500)MG
500 TABLET,CHEWABLE ORAL EVERY 4 HOURS PRN
Status: DISCONTINUED | OUTPATIENT
Start: 2022-08-13 | End: 2022-08-20 | Stop reason: HOSPADM

## 2022-08-13 RX ORDER — ACETAMINOPHEN 500 MG
1000 TABLET ORAL EVERY 6 HOURS PRN
Status: DISCONTINUED | OUTPATIENT
Start: 2022-08-13 | End: 2022-08-20 | Stop reason: HOSPADM

## 2022-08-13 RX ORDER — METHOCARBAMOL 500 MG/1
500 TABLET, FILM COATED ORAL 4 TIMES DAILY PRN
Status: DISCONTINUED | OUTPATIENT
Start: 2022-08-13 | End: 2022-08-20 | Stop reason: HOSPADM

## 2022-08-13 RX ADMIN — Medication 10 ML: at 06:08

## 2022-08-13 RX ADMIN — ACETAMINOPHEN 650 MG: 325 TABLET ORAL at 10:08

## 2022-08-13 RX ADMIN — Medication 10 ML: at 12:08

## 2022-08-13 RX ADMIN — PIPERACILLIN SODIUM AND TAZOBACTAM SODIUM 4.5 G: 4; .5 INJECTION, POWDER, LYOPHILIZED, FOR SOLUTION INTRAVENOUS at 11:08

## 2022-08-13 RX ADMIN — ENOXAPARIN SODIUM 150 MG: 150 INJECTION SUBCUTANEOUS at 09:08

## 2022-08-13 RX ADMIN — MAGNESIUM SULFATE HEPTAHYDRATE: 500 INJECTION, SOLUTION INTRAMUSCULAR; INTRAVENOUS at 10:08

## 2022-08-13 RX ADMIN — I.V. FAT EMULSION 250 ML: 20 EMULSION INTRAVENOUS at 10:08

## 2022-08-13 RX ADMIN — MUPIROCIN: 20 OINTMENT TOPICAL at 09:08

## 2022-08-13 RX ADMIN — METOPROLOL SUCCINATE 25 MG: 25 TABLET, EXTENDED RELEASE ORAL at 09:08

## 2022-08-13 RX ADMIN — ASPIRIN 81 MG: 81 TABLET, COATED ORAL at 04:08

## 2022-08-13 RX ADMIN — GADOBUTROL 10 ML: 604.72 INJECTION INTRAVENOUS at 09:08

## 2022-08-13 RX ADMIN — PIPERACILLIN SODIUM AND TAZOBACTAM SODIUM 4.5 G: 4; .5 INJECTION, POWDER, LYOPHILIZED, FOR SOLUTION INTRAVENOUS at 05:08

## 2022-08-13 RX ADMIN — CALCIUM CARBONATE (ANTACID) CHEW TAB 500 MG 500 MG: 500 CHEW TAB at 02:08

## 2022-08-13 RX ADMIN — METHOCARBAMOL 500 MG: 500 TABLET ORAL at 09:08

## 2022-08-13 RX ADMIN — Medication 6 MG: at 09:08

## 2022-08-13 RX ADMIN — PIPERACILLIN SODIUM AND TAZOBACTAM SODIUM 4.5 G: 4; .5 INJECTION, POWDER, LYOPHILIZED, FOR SOLUTION INTRAVENOUS at 12:08

## 2022-08-13 RX ADMIN — LOPERAMIDE HYDROCHLORIDE 2 MG: 2 CAPSULE ORAL at 09:08

## 2022-08-13 RX ADMIN — ACETAMINOPHEN 1000 MG: 500 TABLET ORAL at 04:08

## 2022-08-13 RX ADMIN — ONDANSETRON 8 MG: 8 TABLET, ORALLY DISINTEGRATING ORAL at 04:08

## 2022-08-13 NOTE — ASSESSMENT & PLAN NOTE
77F PMH endometrial cancer Stage 1A grade 1 s/p TLH/BSO (2016), she now presents with a pelvic mass with compression of rectosigmoid colon.  Multiple biopsies have been performed including rectal biopsies of the mass resulting as necrotic and inflammatory tissue and vaginal cuff biopsy without any cancerous cells identified.  She is severely malnourished and debilitated with an albumin of 1.4 and prealbumin of 8 on admission.  Has A-fib (previously on Eliquis) last echo 8/11/22 EF 55-60%. Bilateral lower extremity venous US showed Right and Left DVT, occlusive on right side.     Plan:  - Malnutrition: Regular diet, Boost TID, TPN; Weekly prealbumin, INR, and triglycerides  - BID lovenox   - Gyn onc consulted for assistance is needed in OR  - Echo EF 55-60%  - Continue zosyn  - Dickson in place   - PT/OT to work with patient in setting of deconditioned state  - Will continue discussions with family as data is gathered in terms of goals of care for this patient and what operative intervention would look like.

## 2022-08-13 NOTE — NURSING
"Pt returned meds given no acute distress spouse at bedside. Attempted to prep pt for transfer to specialty pt stated, "not right now ". Will cont to monitor and evaluate  "

## 2022-08-13 NOTE — PROGRESS NOTES
[facility]  Colorectal Surgery  Progress Note    Patient Name: Linda Pierson  MRN: [id number]  Admission Date: 8/11/2022  Hospital Length of Stay: 2 days  Attending Physician: SALAS Davis MD    Subjective:     Interval History: NAEON.  Remains HDS, afebrile, and on room air. Tolerating diet, no complaints of nausea or vomiting. On TPN, reordered. Having bowel movements, crump in place.  MRI today to assess liver lesions for IR liver biopsy.     Post-Op Info:  * No surgery found *          Medications:  Continuous Infusions:   dextrose 10 % in water (D10W)      dextrose 5 % and 0.9 % NaCl 20 mL/hr at 08/12/22 0740    TPN ADULT CENTRAL LINE CUSTOM 42 mL/hr at 08/12/22 2221    TPN ADULT CENTRAL LINE CUSTOM       Scheduled Meds:   aspirin  81 mg Oral Daily    enoxaparin  1 mg/kg Subcutaneous BID    fat emulsion 20%  250 mL Intravenous Daily    fat emulsion 20%  250 mL Intravenous Daily    metoprolol succinate  25 mg Oral Daily    piperacillin-tazobactam (ZOSYN) IVPB  4.5 g Intravenous Q8H    sodium chloride 0.9%  10 mL Intravenous Q6H     PRN Meds:   acetaminophen    dextrose 10 % in water (D10W)    dextrose 10%    dextrose 10%    glucagon (human recombinant)    loperamide    melatonin    naloxone    ondansetron    oxyCODONE    sodium chloride 0.9%    sodium chloride 0.9%    sodium chloride 0.9%        Objective:     Vital Signs (Most Recent):  Temp: 97.7 °F (36.5 °C) (08/13/22 0357)  Pulse: 105 (08/13/22 0357)  Resp: 18 (08/13/22 0357)  BP: (!) 106/59 (08/13/22 0357)  SpO2: (!) 94 % (08/13/22 0357)   Vital Signs (24h Range):  Temp:  [97.7 °F (36.5 °C)-98.8 °F (37.1 °C)] 97.7 °F (36.5 °C)  Pulse:  [] 105  Resp:  [16-20] 18  SpO2:  [91 %-94 %] 94 %  BP: ()/(52-63) 106/59     Intake/Output - Last 3 Shifts         08/11 0700 08/12 0659 08/12 0700 08/13 0659 08/13 0700 08/14 0659    P.O. 240 100     I.V. (mL/kg) 565.1 (3.8) 681.3 (4.6)     IV Piggyback 255.4 294     TPN  371.6 1188.4     Total Intake(mL/kg) 1432.1 (9.6) 2263.7 (15.1)     Urine (mL/kg/hr) 1000 (0.3) 800 (0.2)     Stool 0 0     Total Output 1000 800     Net +432.1 +1463.7            Stool Occurrence 7 x 4 x             Physical Exam  Constitutional:       Appearance: She is ill-appearing.   HENT:      Head: Normocephalic.      Nose: Nose normal.   Eyes:      General: No scleral icterus.     Pupils: Pupils are equal, round, and reactive to light.   Cardiovascular:      Rate and Rhythm: Normal rate and regular rhythm.      Heart sounds: Normal heart sounds.   Pulmonary:      Effort: Pulmonary effort is normal. No respiratory distress.      Breath sounds: Normal breath sounds. No wheezing or rales.   Abdominal:      Palpations: Abdomen is soft. There is mass.      Tenderness: There is no abdominal tenderness. There is no guarding.      Hernia: A hernia (large ventral hernia) is present.   Genitourinary:     Comments: Dickson in place  Musculoskeletal:         General: Normal range of motion.      Cervical back: Full passive range of motion without pain and neck supple.   Skin:     General: Skin is warm and dry.      Coloration: Skin is pale.   Neurological:      General: No focal deficit present.      Mental Status: She is alert.   Psychiatric:         Mood and Affect: Mood normal.         Behavior: Behavior normal.       Significant Labs:  BMP (Last 3 Results):   Recent Labs   Lab 08/11/22  0601 08/12/22  0406 08/13/22  0419    99 118*   * 133* 133*   K 3.9 3.5 4.0    103 105   CO2 16* 18* 17*   BUN 57* 51* 47*   CREATININE 0.9 0.8 0.8   CALCIUM 8.1* 7.4* 7.7*   MG  --  2.4 2.6       CBC (Last 3 Results):   Recent Labs   Lab 08/10/22  1641 08/11/22  0603 08/12/22  0406   WBC 28.7* 29.05*  29.05* 24.50*   RBC 3.57* 3.56*  3.56* 2.93*   HGB 9.8* 9.7*  9.7* 8.1*   HCT 30.9* 31.0*  31.0* 25.7*   * 335  335 373   MCV 86.6 87  87 88   MCH 27.5* 27.2  27.2 27.6   MCHC 31.7 31.3*  31.3* 31.5*        CMP (Last 3 Results):   Recent Labs   Lab 08/11/22  0601 08/12/22  0406 08/13/22  0419    99 118*   CALCIUM 8.1* 7.4* 7.7*   ALBUMIN 1.4* 1.2* 1.2*   PROT 5.0* 4.5* 4.7*   * 133* 133*   K 3.9 3.5 4.0   CO2 16* 18* 17*    103 105   BUN 57* 51* 47*   CREATININE 0.9 0.8 0.8   ALKPHOS 235* 230* 207*   ALT 9* 10 10   AST 15 14 13   BILITOT 0.3 0.2 0.3       CRP (Last 3 Results):   Recent Labs   Lab 08/10/22  1641 08/11/22  0601 08/13/22  0419   CRP 26.40* 276.9* 165.7*       Coagulation:   Recent Labs   Lab 08/11/22  0806 08/11/22  1702 08/13/22  0419   LABPROT 11.0  --   --    INR 1.1  --   --    APTT >150.0*   < > 33.7*    < > = values in this interval not displayed.       LFTs:   Recent Labs   Lab 08/13/22  0419   ALT 10   AST 13   ALKPHOS 207*   BILITOT 0.3   PROT 4.7*   ALBUMIN 1.2*       Prealbumin (Last 3 Results):   Recent Labs   Lab 08/11/22  0805   PREALBUMIN 8*         Significant Diagnostics:  I have reviewed all pertinent imaging results/findings within the past 24 hours.    Assessment/Plan:     * Pelvic mass  77F PMH endometrial cancer Stage 1A grade 1 s/p TLH/BSO (2016), she now presents with a pelvic mass with compression of rectosigmoid colon.  Multiple biopsies have been performed including rectal biopsies of the mass resulting as necrotic and inflammatory tissue and vaginal cuff biopsy without any cancerous cells identified.  She is severely malnourished and debilitated with an albumin of 1.4 and prealbumin of 8 on admission.  Has A-fib (previously on Eliquis) last echo 8/11/22 EF 55-60%. Bilateral lower extremity venous US showed Right and Left DVT, occlusive on right side.     Plan:  - Malnutrition: Regular diet, Boost TID, TPN; Weekly prealbumin, INR, and triglycerides  - BID lovenox   -DC opioid pain control, start tylenol  - Gyn onc consulted for assistance is needed in OR  - Echo EF 55-60%  - Continue zosyn  - Dickson in place   - PT/OT to work with patient in setting of  deconditioned state  - Will continue discussions with family as data is gathered in terms of goals of care for this patient and what operative intervention would look like.        Leny Beckford MD  Colorectal Surgery  [facility]

## 2022-08-13 NOTE — SUBJECTIVE & OBJECTIVE
Subjective:     Interval History: NAEON.  Remains HDS, afebrile, and on room air. Tolerating diet, no complaints of nausea or vomiting. On TPN, reordered. Having bowel movements, crump in place.  MRI today to assess liver lesions for IR liver biopsy.     Post-Op Info:  * No surgery found *          Medications:  Continuous Infusions:   dextrose 10 % in water (D10W)      dextrose 5 % and 0.9 % NaCl 20 mL/hr at 08/12/22 0740    TPN ADULT CENTRAL LINE CUSTOM 42 mL/hr at 08/12/22 2221    TPN ADULT CENTRAL LINE CUSTOM       Scheduled Meds:   aspirin  81 mg Oral Daily    enoxaparin  1 mg/kg Subcutaneous BID    fat emulsion 20%  250 mL Intravenous Daily    fat emulsion 20%  250 mL Intravenous Daily    metoprolol succinate  25 mg Oral Daily    piperacillin-tazobactam (ZOSYN) IVPB  4.5 g Intravenous Q8H    sodium chloride 0.9%  10 mL Intravenous Q6H     PRN Meds:   acetaminophen    dextrose 10 % in water (D10W)    dextrose 10%    dextrose 10%    glucagon (human recombinant)    loperamide    melatonin    naloxone    ondansetron    oxyCODONE    sodium chloride 0.9%    sodium chloride 0.9%    sodium chloride 0.9%        Objective:     Vital Signs (Most Recent):  Temp: 97.7 °F (36.5 °C) (08/13/22 0357)  Pulse: 105 (08/13/22 0357)  Resp: 18 (08/13/22 0357)  BP: (!) 106/59 (08/13/22 0357)  SpO2: (!) 94 % (08/13/22 0357)   Vital Signs (24h Range):  Temp:  [97.7 °F (36.5 °C)-98.8 °F (37.1 °C)] 97.7 °F (36.5 °C)  Pulse:  [] 105  Resp:  [16-20] 18  SpO2:  [91 %-94 %] 94 %  BP: ()/(52-63) 106/59     Intake/Output - Last 3 Shifts         08/11 0700  08/12 0659 08/12 0700  08/13 0659 08/13 0700  08/14 0659    P.O. 240 100     I.V. (mL/kg) 565.1 (3.8) 681.3 (4.6)     IV Piggyback 255.4 294     .6 1188.4     Total Intake(mL/kg) 1432.1 (9.6) 2263.7 (15.1)     Urine (mL/kg/hr) 1000 (0.3) 800 (0.2)     Stool 0 0     Total Output 1000 800     Net +432.1 +1463.7            Stool Occurrence 7 x 4 x             Physical  Exam  Constitutional:       Appearance: She is ill-appearing.   HENT:      Head: Normocephalic.      Nose: Nose normal.   Eyes:      General: No scleral icterus.     Pupils: Pupils are equal, round, and reactive to light.   Cardiovascular:      Rate and Rhythm: Normal rate and regular rhythm.      Heart sounds: Normal heart sounds.   Pulmonary:      Effort: Pulmonary effort is normal. No respiratory distress.      Breath sounds: Normal breath sounds. No wheezing or rales.   Abdominal:      Palpations: Abdomen is soft. There is mass.      Tenderness: There is no abdominal tenderness. There is no guarding.      Hernia: A hernia (large ventral hernia) is present.   Genitourinary:     Comments: Dickson in place  Musculoskeletal:         General: Normal range of motion.      Cervical back: Full passive range of motion without pain and neck supple.   Skin:     General: Skin is warm and dry.      Coloration: Skin is pale.   Neurological:      General: No focal deficit present.      Mental Status: She is alert.   Psychiatric:         Mood and Affect: Mood normal.         Behavior: Behavior normal.       Significant Labs:  BMP (Last 3 Results):   Recent Labs   Lab 08/11/22  0601 08/12/22  0406 08/13/22  0419    99 118*   * 133* 133*   K 3.9 3.5 4.0    103 105   CO2 16* 18* 17*   BUN 57* 51* 47*   CREATININE 0.9 0.8 0.8   CALCIUM 8.1* 7.4* 7.7*   MG  --  2.4 2.6       CBC (Last 3 Results):   Recent Labs   Lab 08/10/22  1641 08/11/22  0603 08/12/22  0406   WBC 28.7* 29.05*  29.05* 24.50*   RBC 3.57* 3.56*  3.56* 2.93*   HGB 9.8* 9.7*  9.7* 8.1*   HCT 30.9* 31.0*  31.0* 25.7*   * 335  335 373   MCV 86.6 87  87 88   MCH 27.5* 27.2  27.2 27.6   MCHC 31.7 31.3*  31.3* 31.5*       CMP (Last 3 Results):   Recent Labs   Lab 08/11/22  0601 08/12/22  0406 08/13/22  0419    99 118*   CALCIUM 8.1* 7.4* 7.7*   ALBUMIN 1.4* 1.2* 1.2*   PROT 5.0* 4.5* 4.7*   * 133* 133*   K 3.9 3.5 4.0   CO2 16*  18* 17*    103 105   BUN 57* 51* 47*   CREATININE 0.9 0.8 0.8   ALKPHOS 235* 230* 207*   ALT 9* 10 10   AST 15 14 13   BILITOT 0.3 0.2 0.3       CRP (Last 3 Results):   Recent Labs   Lab 08/10/22  1641 08/11/22  0601 08/13/22  0419   CRP 26.40* 276.9* 165.7*       Coagulation:   Recent Labs   Lab 08/11/22  0806 08/11/22  1702 08/13/22  0419   LABPROT 11.0  --   --    INR 1.1  --   --    APTT >150.0*   < > 33.7*    < > = values in this interval not displayed.       LFTs:   Recent Labs   Lab 08/13/22  0419   ALT 10   AST 13   ALKPHOS 207*   BILITOT 0.3   PROT 4.7*   ALBUMIN 1.2*       Prealbumin (Last 3 Results):   Recent Labs   Lab 08/11/22  0805   PREALBUMIN 8*         Significant Diagnostics:  I have reviewed all pertinent imaging results/findings within the past 24 hours.

## 2022-08-13 NOTE — PLAN OF CARE
POC is reviewed and understood by patient. Oriented x4. Assist x4. Family at bdside. Imodium ordered and given for diarrhea. Dickson in place by urology. No c/o pain, nausea. No sign of distress noted. HOB elevated. Bed in lowest position. Call bell in reach. WCTM.

## 2022-08-14 LAB
ALBUMIN SERPL BCP-MCNC: 1.2 G/DL (ref 3.5–5.2)
ALP SERPL-CCNC: 203 U/L (ref 55–135)
ALT SERPL W/O P-5'-P-CCNC: 9 U/L (ref 10–44)
ANION GAP SERPL CALC-SCNC: 10 MMOL/L (ref 8–16)
AST SERPL-CCNC: 19 U/L (ref 10–40)
BASOPHILS # BLD AUTO: 0.04 K/UL (ref 0–0.2)
BASOPHILS NFR BLD: 0.2 % (ref 0–1.9)
BILIRUB SERPL-MCNC: 0.2 MG/DL (ref 0.1–1)
BUN SERPL-MCNC: 45 MG/DL (ref 8–23)
CALCIUM SERPL-MCNC: 7.6 MG/DL (ref 8.7–10.5)
CHLORIDE SERPL-SCNC: 103 MMOL/L (ref 95–110)
CO2 SERPL-SCNC: 19 MMOL/L (ref 23–29)
CREAT SERPL-MCNC: 0.7 MG/DL (ref 0.5–1.4)
CRP SERPL-MCNC: 157 MG/L (ref 0–8.2)
DIFFERENTIAL METHOD: ABNORMAL
EOSINOPHIL # BLD AUTO: 0.1 K/UL (ref 0–0.5)
EOSINOPHIL NFR BLD: 0.2 % (ref 0–8)
ERYTHROCYTE [DISTWIDTH] IN BLOOD BY AUTOMATED COUNT: 19.5 % (ref 11.5–14.5)
EST. GFR  (NO RACE VARIABLE): >60 ML/MIN/1.73 M^2
FACT X PPP CHRO-ACNC: 1.2 IU/ML (ref 0.3–0.7)
GLUCOSE SERPL-MCNC: 120 MG/DL (ref 70–110)
HCT VFR BLD AUTO: 24.7 % (ref 37–48.5)
HGB BLD-MCNC: 7.8 G/DL (ref 12–16)
IMM GRANULOCYTES # BLD AUTO: 0.2 K/UL (ref 0–0.04)
IMM GRANULOCYTES NFR BLD AUTO: 0.8 % (ref 0–0.5)
LYMPHOCYTES # BLD AUTO: 2.1 K/UL (ref 1–4.8)
LYMPHOCYTES NFR BLD: 8.4 % (ref 18–48)
MAGNESIUM SERPL-MCNC: 2.4 MG/DL (ref 1.6–2.6)
MCH RBC QN AUTO: 26.8 PG (ref 27–31)
MCHC RBC AUTO-ENTMCNC: 31.6 G/DL (ref 32–36)
MCV RBC AUTO: 85 FL (ref 82–98)
MONOCYTES # BLD AUTO: 1.1 K/UL (ref 0.3–1)
MONOCYTES NFR BLD: 4.4 % (ref 4–15)
NEUTROPHILS # BLD AUTO: 21.1 K/UL (ref 1.8–7.7)
NEUTROPHILS NFR BLD: 86 % (ref 38–73)
NRBC BLD-RTO: 0 /100 WBC
PHOSPHATE SERPL-MCNC: 3.2 MG/DL (ref 2.7–4.5)
PLATELET # BLD AUTO: 324 K/UL (ref 150–450)
PMV BLD AUTO: 9.5 FL (ref 9.2–12.9)
POCT GLUCOSE: 122 MG/DL (ref 70–110)
POCT GLUCOSE: 130 MG/DL (ref 70–110)
POCT GLUCOSE: 152 MG/DL (ref 70–110)
POCT GLUCOSE: 174 MG/DL (ref 70–110)
POTASSIUM SERPL-SCNC: 3.8 MMOL/L (ref 3.5–5.1)
PROT SERPL-MCNC: 4.7 G/DL (ref 6–8.4)
RBC # BLD AUTO: 2.91 M/UL (ref 4–5.4)
SODIUM SERPL-SCNC: 132 MMOL/L (ref 136–145)
WBC # BLD AUTO: 24.52 K/UL (ref 3.9–12.7)

## 2022-08-14 PROCEDURE — 25000003 PHARM REV CODE 250: Performed by: STUDENT IN AN ORGANIZED HEALTH CARE EDUCATION/TRAINING PROGRAM

## 2022-08-14 PROCEDURE — 25000003 PHARM REV CODE 250

## 2022-08-14 PROCEDURE — 20600001 HC STEP DOWN PRIVATE ROOM

## 2022-08-14 PROCEDURE — 83735 ASSAY OF MAGNESIUM: CPT | Performed by: STUDENT IN AN ORGANIZED HEALTH CARE EDUCATION/TRAINING PROGRAM

## 2022-08-14 PROCEDURE — 85025 COMPLETE CBC W/AUTO DIFF WBC: CPT

## 2022-08-14 PROCEDURE — A4216 STERILE WATER/SALINE, 10 ML: HCPCS | Performed by: COLON & RECTAL SURGERY

## 2022-08-14 PROCEDURE — A4217 STERILE WATER/SALINE, 500 ML: HCPCS

## 2022-08-14 PROCEDURE — B4185 PARENTERAL SOL 10 GM LIPIDS: HCPCS

## 2022-08-14 PROCEDURE — 63600175 PHARM REV CODE 636 W HCPCS: Performed by: STUDENT IN AN ORGANIZED HEALTH CARE EDUCATION/TRAINING PROGRAM

## 2022-08-14 PROCEDURE — 84100 ASSAY OF PHOSPHORUS: CPT | Performed by: STUDENT IN AN ORGANIZED HEALTH CARE EDUCATION/TRAINING PROGRAM

## 2022-08-14 PROCEDURE — 80053 COMPREHEN METABOLIC PANEL: CPT | Performed by: STUDENT IN AN ORGANIZED HEALTH CARE EDUCATION/TRAINING PROGRAM

## 2022-08-14 PROCEDURE — 85520 HEPARIN ASSAY: CPT | Performed by: COLON & RECTAL SURGERY

## 2022-08-14 PROCEDURE — 63600175 PHARM REV CODE 636 W HCPCS

## 2022-08-14 PROCEDURE — 86140 C-REACTIVE PROTEIN: CPT | Performed by: STUDENT IN AN ORGANIZED HEALTH CARE EDUCATION/TRAINING PROGRAM

## 2022-08-14 PROCEDURE — 63600175 PHARM REV CODE 636 W HCPCS: Performed by: COLON & RECTAL SURGERY

## 2022-08-14 PROCEDURE — 25000003 PHARM REV CODE 250: Performed by: COLON & RECTAL SURGERY

## 2022-08-14 RX ORDER — OXYCODONE HYDROCHLORIDE 5 MG/1
5 TABLET ORAL EVERY 4 HOURS PRN
Status: DISCONTINUED | OUTPATIENT
Start: 2022-08-14 | End: 2022-08-16

## 2022-08-14 RX ADMIN — ENOXAPARIN SODIUM 150 MG: 150 INJECTION SUBCUTANEOUS at 09:08

## 2022-08-14 RX ADMIN — DEXTROSE AND SODIUM CHLORIDE: 5; .9 INJECTION, SOLUTION INTRAVENOUS at 09:08

## 2022-08-14 RX ADMIN — PIPERACILLIN SODIUM AND TAZOBACTAM SODIUM 4.5 G: 4; .5 INJECTION, POWDER, LYOPHILIZED, FOR SOLUTION INTRAVENOUS at 11:08

## 2022-08-14 RX ADMIN — PIPERACILLIN SODIUM AND TAZOBACTAM SODIUM 4.5 G: 4; .5 INJECTION, POWDER, LYOPHILIZED, FOR SOLUTION INTRAVENOUS at 03:08

## 2022-08-14 RX ADMIN — PIPERACILLIN SODIUM AND TAZOBACTAM SODIUM 4.5 G: 4; .5 INJECTION, POWDER, LYOPHILIZED, FOR SOLUTION INTRAVENOUS at 08:08

## 2022-08-14 RX ADMIN — CALCIUM CARBONATE (ANTACID) CHEW TAB 500 MG 500 MG: 500 CHEW TAB at 10:08

## 2022-08-14 RX ADMIN — I.V. FAT EMULSION 250 ML: 20 EMULSION INTRAVENOUS at 10:08

## 2022-08-14 RX ADMIN — MUPIROCIN: 20 OINTMENT TOPICAL at 09:08

## 2022-08-14 RX ADMIN — Medication 10 ML: at 12:08

## 2022-08-14 RX ADMIN — OXYCODONE 5 MG: 5 TABLET ORAL at 05:08

## 2022-08-14 RX ADMIN — Medication 6 MG: at 10:08

## 2022-08-14 RX ADMIN — Medication 10 ML: at 06:08

## 2022-08-14 RX ADMIN — ASPIRIN 81 MG: 81 TABLET, COATED ORAL at 04:08

## 2022-08-14 RX ADMIN — METOPROLOL SUCCINATE 25 MG: 25 TABLET, EXTENDED RELEASE ORAL at 09:08

## 2022-08-14 RX ADMIN — OXYCODONE 5 MG: 5 TABLET ORAL at 10:08

## 2022-08-14 RX ADMIN — MAGNESIUM SULFATE HEPTAHYDRATE: 500 INJECTION, SOLUTION INTRAMUSCULAR; INTRAVENOUS at 10:08

## 2022-08-14 NOTE — PROGRESS NOTES
[facility]  Urology  Progress Note    Patient Name: Linda Pierson  MRN: [id number]  Admission Date: 8/11/2022  Hospital Length of Stay: 3 days  Code Status: Full Code   Attending Provider: SALAS Davis MD   Primary Care Physician: Primary Doctor No    Subjective:     HPI:  Linda Pierson is a 77 year old female with a history of endometrial cancer, AF (on eliquis), pelvic mass of quetionable origin presently admitted with weakness. Urology was consulted for questionable bladder involvement.    History per patient and chart. Per patient, she had a recent fall secondary to increased weakness. Denies any fevers, chills. Denies nausea, vomiting. Patient with a history of a pelvic mass of unknown origin. Had a prior biopsy mostly necrotic. Her recent imaging is not presently available for review, but per report there has been interval growth of her mass with questionable involvement of the bladder. Patient is a high risk surgical candidate and Dr. Davis requested urologic evaluation to determine extent of resection in order to adequately  patient and family.    On assessment, patient is AF, HDS. Leukocytosis to 29, Cr 0.9 (baseline 0.9) appears to have improved with crump placement at OSH. UA LE positive, nitrite negative. Microscopy with 12 WBC, trace bacteria. Prior CT reviewed which shows bilateral mild hydronephrosis and hydroureter down to the level of a widely distended bladder. There is a large pelvic mass posterior to the bladder with questionable involvement of the bladder. Per CT read from yesterday, interval growth of pelvic mass with new direct extension into rectum and sigmoid colon as well as stable appearing bilateral hydronephrosis.       Interval History:   Crump draining clear yellow urine. Pain mostly controlled.   Uop adequate.       Objective:     Temp:  [96.5 °F (35.8 °C)-98.5 °F (36.9 °C)] 98.5 °F (36.9 °C)  Pulse:  [] 104  Resp:  [16-18] 18  SpO2:  [92 %-95 %] 94 %  BP:  ()/(50-63) 105/63     Body mass index is 54.91 kg/m².           Drains       Drain  Duration                  Urethral Catheter 08/12/22 1400 Straight-tip 18 Fr. 1 day                    Physical Exam  Constitutional:       General: She is not in acute distress.     Appearance: She is well-developed.   HENT:      Head: Normocephalic.   Cardiovascular:      Rate and Rhythm: Normal rate.   Pulmonary:      Effort: Pulmonary effort is normal. No respiratory distress.   Abdominal:      Palpations: Abdomen is soft.   Genitourinary:     Comments: Dickson draining clear yellow urine.   Musculoskeletal:         General: Normal range of motion.      Cervical back: Normal range of motion.   Skin:     General: Skin is warm.   Neurological:      Mental Status: She is alert and oriented to person, place, and time.       Significant Labs:    BMP:  Recent Labs   Lab 08/10/22  1641 08/11/22  0601 08/12/22  0406 08/13/22  0419 08/14/22  0524   *   < > 133* 133* 132*   K 3.5   < > 3.5 4.0 3.8      < > 103 105 103   CO2 20   < > 18* 17* 19*   BUN 53*   < > 51* 47* 45*   CREATININE 0.96   < > 0.8 0.8 0.7   GLUCOSE 112*  --   --   --   --    CALCIUM 8.1*   < > 7.4* 7.7* 7.6*    < > = values in this interval not displayed.       CBC:   Recent Labs   Lab 08/11/22  0603 08/12/22  0406 08/14/22  0524   WBC 29.05*  29.05* 24.50* 24.52*   HGB 9.7*  9.7* 8.1* 7.8*   HCT 31.0*  31.0* 25.7* 24.7*     335 373 324       All pertinent labs results from the past 24 hours have been reviewed.    Significant Imaging:  All pertinent imaging results/findings from the past 24 hours have been reviewed.                    Assessment/Plan:     Hydronephrosis  Linda Pierson is a 77 year old female with a history of endometrial cancer, AF (on eliquis), pelvic mass of quetionable origin presently admitted with weakness. Urology was consulted for questionable bladder involvement.    - bedside flexible cystoscopy performed on Friday with  concern of bladder involvement.   - Result of cystoscopy discussed with patient.   - We also discussed that the liver MRI is concerning for a possible metastatic lesion.   - Dr. Davis's team has discussed surgical vs non surgical treatment options with this patient.   - Please notify  team in advance if pelvic exenteration is planned.   - IR biopsy of liver per primary.   - Recommend crump to stay indwelling for now and on discharge as cystoscopy showed concern for possible bladder outlet obstruction due to mass effect.   - Rest of care per primary.         VTE Risk Mitigation (From admission, onward)         Ordered     enoxaparin injection 150 mg  2 times daily         08/12/22 1501     IP VTE HIGH RISK PATIENT  Once         08/11/22 0646     Place sequential compression device  Until discontinued         08/11/22 5948                Ryan Cristobal MD  Urology  [facility]

## 2022-08-14 NOTE — PLAN OF CARE
POC is reviewed and understood by patient. Oriented x4. Assist x4. Family at [facility].MD on call notified of patient having pain requiring more than Tylenol to resolve it. Robaxin ordered and given. Pt seemed to be okay with pain level the rest of the night. Pt's family prefers Oxy be placed back on MAR for breakthrough pain control but st half the dosage. No sign of distress noted. HOB elevated. Bed in lowest position. Call bell in reach. WCTM.

## 2022-08-14 NOTE — ASSESSMENT & PLAN NOTE
Linda Pierson is a 77 year old female with a history of endometrial cancer, AF (on eliquis), pelvic mass of quetionable origin presently admitted with weakness. Urology was consulted for questionable bladder involvement.    - bedside flexible cystoscopy performed on Friday with concern of bladder involvement.   - Result of cystoscopy discussed with patient.   - We also discussed that the liver MRI is concerning for a possible metastatic lesion.   - Dr. Davis's team has discussed surgical vs non surgical treatment options with this patient.   - Please notify  team in advance if pelvic exenteration is planned.   - IR biopsy of liver per primary.   - Recommend crump to stay indwelling for now and on discharge as cystoscopy showed concern for possible bladder outlet obstruction due to mass effect.   - Rest of care per primary.

## 2022-08-14 NOTE — PLAN OF CARE
"  Problem: Adult Inpatient Plan of Care  Goal: Plan of Care Review  Outcome: Ongoing, Progressing     Problem: Adult Inpatient Plan of Care  Goal: Patient-Specific Goal (Individualized)  Outcome: Ongoing, Progressing     Problem: Infection  Goal: Absence of Infection Signs and Symptoms  Outcome: Ongoing, Progressing                                            Patient is on Zosyn IVPB every 8 hours     Problem: Fall Injury Risk  Goal: Absence of Fall and Fall-Related Injury  Outcome: Ongoing, Progressing     Problem: Coping Ineffective  Goal: Effective Coping  Outcome: Ongoing, Progressing                   Patient would benefit from a psych consult d/t diagnosis and prognosis     Problem: Pain Acute  Goal: Acceptable Pain Control and Functional Ability  Outcome: Ongoing, Progressing    SHIFT SUMMARY    Patient is AAO x2-3, reorient to time. Patient reported pain at beginning of shift and received new orders for Oxy 5 mg and patient tolerated pain med without any SE and evaluated at BS per Dr. Reed.   Patient presents with a poor appetite and remains on TPN and lipids[ orders renewed].  Albumin: 1.2.  New orders to DC lovenox and administered ASA 81 mg EC.  NPO after MN for liver biopsy [8/15/22].  Consents signed per daughter.  Anti Xa ordered and results: 1.20.  POC reviewed with patient, family and significant other.  Patient provided with comfort, turning and offering hydration throughout shift. Warm packs applied to abdominal mass and reported, " It feels better."      "

## 2022-08-14 NOTE — PROGRESS NOTES
[facility]  Colorectal Surgery  Progress Note    Patient Name: Linda Pierson  MRN: [id number]  Admission Date: 8/11/2022  Hospital Length of Stay: 3 days  Attending Physician: SALAS Davis MD    Subjective:     Interval History: Patient with some pain overnight. Now controlled. Denies Nausea, vomiting. On regular diet, tolerating. Malnourished, on TPN, reordered. Having bowel movements, crump in place. IR liver biopsy tomorrow, will hold PM lovenox, NPO at midnight.     Post-Op Info:  * No surgery found *          Medications:  Continuous Infusions:   dextrose 10 % in water (D10W)      dextrose 5 % and 0.9 % NaCl 20 mL/hr at 08/12/22 0740    TPN ADULT CENTRAL LINE CUSTOM 45 mL/hr at 08/13/22 2249    TPN ADULT CENTRAL LINE CUSTOM       Scheduled Meds:   aspirin  81 mg Oral Daily    enoxaparin  1 mg/kg Subcutaneous BID    fat emulsion 20%  250 mL Intravenous Daily    fat emulsion 20%  250 mL Intravenous Daily    metoprolol succinate  25 mg Oral Daily    mupirocin   Nasal BID    piperacillin-tazobactam (ZOSYN) IVPB  4.5 g Intravenous Q8H    sodium chloride 0.9%  10 mL Intravenous Q6H     PRN Meds:   acetaminophen    calcium carbonate    dextrose 10 % in water (D10W)    dextrose 10%    dextrose 10%    glucagon (human recombinant)    loperamide    melatonin    methocarbamoL    naloxone    ondansetron    sodium chloride 0.9%    sodium chloride 0.9%    sodium chloride 0.9%        Objective:     Vital Signs (Most Recent):  Temp: 98.5 °F (36.9 °C) (08/14/22 0742)  Pulse: 104 (08/14/22 0742)  Resp: 18 (08/14/22 0742)  BP: 105/63 (08/14/22 0742)  SpO2: (!) 94 % (08/14/22 0742)   Vital Signs (24h Range):  Temp:  [96.5 °F (35.8 °C)-98.5 °F (36.9 °C)] 98.5 °F (36.9 °C)  Pulse:  [] 104  Resp:  [16-18] 18  SpO2:  [92 %-95 %] 94 %  BP: ()/(50-63) 105/63     Intake/Output - Last 3 Shifts         08/12 0700 08/13 0659 08/13 0700 08/14 0659 08/14 0700  08/15 0659    P.O. 100 120      I.V. (mL/kg) 681.3 (4.6)      IV Piggyback 294 289.8     TPN 1188.4 1228     Total Intake(mL/kg) 2263.7 (15.1) 1637.8 (10.9)     Urine (mL/kg/hr) 800 (0.2) 750 (0.2)     Stool 0 0     Total Output 800 750     Net +1463.7 +887.8            Urine Occurrence  1 x     Stool Occurrence 4 x 2 x             Physical Exam  Constitutional:       Appearance: She is ill-appearing.   HENT:      Head: Normocephalic.      Nose: Nose normal.   Eyes:      General: No scleral icterus.     Pupils: Pupils are equal, round, and reactive to light.   Cardiovascular:      Rate and Rhythm: Normal rate and regular rhythm.      Heart sounds: Normal heart sounds.   Pulmonary:      Effort: Pulmonary effort is normal. No respiratory distress.      Breath sounds: Normal breath sounds. No wheezing or rales.   Abdominal:      Palpations: Abdomen is soft. There is mass.      Tenderness: There is no abdominal tenderness. There is no guarding.      Hernia: A hernia (large ventral hernia) is present.   Genitourinary:     Comments: Dickson in place  Musculoskeletal:         General: Normal range of motion.      Cervical back: Full passive range of motion without pain and neck supple.   Skin:     General: Skin is warm and dry.      Coloration: Skin is pale.   Neurological:      General: No focal deficit present.      Mental Status: She is alert.   Psychiatric:         Mood and Affect: Mood normal.         Behavior: Behavior normal.       Significant Labs:  BMP (Last 3 Results):   Recent Labs   Lab 08/12/22  0406 08/13/22  0419 08/14/22  0524   GLU 99 118* 120*   * 133* 132*   K 3.5 4.0 3.8    105 103   CO2 18* 17* 19*   BUN 51* 47* 45*   CREATININE 0.8 0.8 0.7   CALCIUM 7.4* 7.7* 7.6*   MG 2.4 2.6 2.4       CBC (Last 3 Results):   Recent Labs   Lab 08/11/22  0603 08/12/22  0406 08/14/22  0524   WBC 29.05*  29.05* 24.50* 24.52*   RBC 3.56*  3.56* 2.93* 2.91*   HGB 9.7*  9.7* 8.1* 7.8*   HCT 31.0*  31.0* 25.7* 24.7*     538 422 501    MCV 87  87 88 85   MCH 27.2  27.2 27.6 26.8*   MCHC 31.3*  31.3* 31.5* 31.6*       CMP (Last 3 Results):   Recent Labs   Lab 08/12/22  0406 08/13/22  0419 08/14/22  0524   GLU 99 118* 120*   CALCIUM 7.4* 7.7* 7.6*   ALBUMIN 1.2* 1.2* 1.2*   PROT 4.5* 4.7* 4.7*   * 133* 132*   K 3.5 4.0 3.8   CO2 18* 17* 19*    105 103   BUN 51* 47* 45*   CREATININE 0.8 0.8 0.7   ALKPHOS 230* 207* 203*   ALT 10 10 9*   AST 14 13 19   BILITOT 0.2 0.3 0.2       CRP (Last 3 Results):   Recent Labs   Lab 08/11/22  0601 08/13/22  0419 08/14/22  0524   .9* 165.7* 157.0*       Coagulation:   Recent Labs   Lab 08/11/22  0806 08/11/22  1702 08/13/22  0419   LABPROT 11.0  --   --    INR 1.1  --   --    APTT >150.0*   < > 33.7*    < > = values in this interval not displayed.       LFTs:   Recent Labs   Lab 08/14/22  0524   ALT 9*   AST 19   ALKPHOS 203*   BILITOT 0.2   PROT 4.7*   ALBUMIN 1.2*       Prealbumin (Last 3 Results):   Recent Labs   Lab 08/11/22  0805   PREALBUMIN 8*         Significant Diagnostics:  I have reviewed all pertinent imaging results/findings within the past 24 hours.    Assessment/Plan:     * Pelvic mass  77F PMH endometrial cancer Stage 1A grade 1 s/p TLH/BSO (2016), she now presents with a pelvic mass with compression of rectosigmoid colon.  Multiple biopsies have been performed including rectal biopsies of the mass resulting as necrotic and inflammatory tissue and vaginal cuff biopsy without any cancerous cells identified.  She is severely malnourished and debilitated with an albumin of 1.4 and prealbumin of 8 on admission.  Has A-fib (previously on Eliquis) last echo 8/11/22 EF 55-60%. Bilateral lower extremity venous US showed Right and Left DVT, occlusive on right side. MRI 8/13/2022 showed two indeterminate enhancing hepatic lesions, largest within the right hepatic dome.  Cannot exclude metastatic disease, recommended further evaluation with tissue sampling.     Plan:  - Malnutrition:  Regular diet, Boost TID, TPN; Weekly prealbumin, INR, and triglycerides  - Hold PM lovenox   - NPO at midnight  - IR liver biopsy tomorrow  - Gyn onc consulted for assistance is needed in OR  - Echo EF 55-60%  - Continue zosyn  - Dickson in place   - PT/OT to work with patient in setting of deconditioned state  - Will continue discussions with family as data is gathered in terms of goals of care for this patient and what operative intervention would look like.          Leny Beckford MD  Colorectal Surgery  [facility]

## 2022-08-14 NOTE — SUBJECTIVE & OBJECTIVE
Subjective:     Interval History: Patient with some pain overnight. Now controlled. Denies Nausea, vomiting. On regular diet, tolerating. Malnourished, on TPN, reordered. Having bowel movements, crump in place. IR liver biopsy tomorrow, will hold PM lovenox, NPO at midnight.     Post-Op Info:  * No surgery found *          Medications:  Continuous Infusions:   dextrose 10 % in water (D10W)      dextrose 5 % and 0.9 % NaCl 20 mL/hr at 08/12/22 0740    TPN ADULT CENTRAL LINE CUSTOM 45 mL/hr at 08/13/22 2249    TPN ADULT CENTRAL LINE CUSTOM       Scheduled Meds:   aspirin  81 mg Oral Daily    enoxaparin  1 mg/kg Subcutaneous BID    fat emulsion 20%  250 mL Intravenous Daily    fat emulsion 20%  250 mL Intravenous Daily    metoprolol succinate  25 mg Oral Daily    mupirocin   Nasal BID    piperacillin-tazobactam (ZOSYN) IVPB  4.5 g Intravenous Q8H    sodium chloride 0.9%  10 mL Intravenous Q6H     PRN Meds:   acetaminophen    calcium carbonate    dextrose 10 % in water (D10W)    dextrose 10%    dextrose 10%    glucagon (human recombinant)    loperamide    melatonin    methocarbamoL    naloxone    ondansetron    sodium chloride 0.9%    sodium chloride 0.9%    sodium chloride 0.9%        Objective:     Vital Signs (Most Recent):  Temp: 98.5 °F (36.9 °C) (08/14/22 0742)  Pulse: 104 (08/14/22 0742)  Resp: 18 (08/14/22 0742)  BP: 105/63 (08/14/22 0742)  SpO2: (!) 94 % (08/14/22 0742)   Vital Signs (24h Range):  Temp:  [96.5 °F (35.8 °C)-98.5 °F (36.9 °C)] 98.5 °F (36.9 °C)  Pulse:  [] 104  Resp:  [16-18] 18  SpO2:  [92 %-95 %] 94 %  BP: ()/(50-63) 105/63     Intake/Output - Last 3 Shifts         08/12 0700 08/13 0659 08/13 0700 08/14 0659 08/14 0700  08/15 0659    P.O. 100 120     I.V. (mL/kg) 681.3 (4.6)      IV Piggyback 294 289.8     TPN 1188.4 1228     Total Intake(mL/kg) 2263.7 (15.1) 1637.8 (10.9)     Urine (mL/kg/hr) 800 (0.2) 750 (0.2)     Stool 0 0     Total Output 800 750     Net +1463.7 +887.8             Urine Occurrence  1 x     Stool Occurrence 4 x 2 x             Physical Exam  Constitutional:       Appearance: She is ill-appearing.   HENT:      Head: Normocephalic.      Nose: Nose normal.   Eyes:      General: No scleral icterus.     Pupils: Pupils are equal, round, and reactive to light.   Cardiovascular:      Rate and Rhythm: Normal rate and regular rhythm.      Heart sounds: Normal heart sounds.   Pulmonary:      Effort: Pulmonary effort is normal. No respiratory distress.      Breath sounds: Normal breath sounds. No wheezing or rales.   Abdominal:      Palpations: Abdomen is soft. There is mass.      Tenderness: There is no abdominal tenderness. There is no guarding.      Hernia: A hernia (large ventral hernia) is present.   Genitourinary:     Comments: Dickson in place  Musculoskeletal:         General: Normal range of motion.      Cervical back: Full passive range of motion without pain and neck supple.   Skin:     General: Skin is warm and dry.      Coloration: Skin is pale.   Neurological:      General: No focal deficit present.      Mental Status: She is alert.   Psychiatric:         Mood and Affect: Mood normal.         Behavior: Behavior normal.       Significant Labs:  BMP (Last 3 Results):   Recent Labs   Lab 08/12/22  0406 08/13/22 0419 08/14/22  0524   GLU 99 118* 120*   * 133* 132*   K 3.5 4.0 3.8    105 103   CO2 18* 17* 19*   BUN 51* 47* 45*   CREATININE 0.8 0.8 0.7   CALCIUM 7.4* 7.7* 7.6*   MG 2.4 2.6 2.4       CBC (Last 3 Results):   Recent Labs   Lab 08/11/22  0603 08/12/22  0406 08/14/22  0524   WBC 29.05*  29.05* 24.50* 24.52*   RBC 3.56*  3.56* 2.93* 2.91*   HGB 9.7*  9.7* 8.1* 7.8*   HCT 31.0*  31.0* 25.7* 24.7*     335 373 324   MCV 87  87 88 85   MCH 27.2  27.2 27.6 26.8*   MCHC 31.3*  31.3* 31.5* 31.6*       CMP (Last 3 Results):   Recent Labs   Lab 08/12/22  0406 08/13/22  0419 08/14/22  0524   GLU 99 118* 120*   CALCIUM 7.4* 7.7* 7.6*   ALBUMIN 1.2*  1.2* 1.2*   PROT 4.5* 4.7* 4.7*   * 133* 132*   K 3.5 4.0 3.8   CO2 18* 17* 19*    105 103   BUN 51* 47* 45*   CREATININE 0.8 0.8 0.7   ALKPHOS 230* 207* 203*   ALT 10 10 9*   AST 14 13 19   BILITOT 0.2 0.3 0.2       CRP (Last 3 Results):   Recent Labs   Lab 08/11/22  0601 08/13/22  0419 08/14/22  0524   .9* 165.7* 157.0*       Coagulation:   Recent Labs   Lab 08/11/22  0806 08/11/22  1702 08/13/22 0419   LABPROT 11.0  --   --    INR 1.1  --   --    APTT >150.0*   < > 33.7*    < > = values in this interval not displayed.       LFTs:   Recent Labs   Lab 08/14/22  0524   ALT 9*   AST 19   ALKPHOS 203*   BILITOT 0.2   PROT 4.7*   ALBUMIN 1.2*       Prealbumin (Last 3 Results):   Recent Labs   Lab 08/11/22  0805   PREALBUMIN 8*         Significant Diagnostics:  I have reviewed all pertinent imaging results/findings within the past 24 hours.

## 2022-08-14 NOTE — SUBJECTIVE & OBJECTIVE
Interval History:   Dickson draining clear yellow urine. Pain mostly controlled.   Uop adequate.       Objective:     Temp:  [96.5 °F (35.8 °C)-98.5 °F (36.9 °C)] 98.5 °F (36.9 °C)  Pulse:  [] 104  Resp:  [16-18] 18  SpO2:  [92 %-95 %] 94 %  BP: ()/(50-63) 105/63     Body mass index is 54.91 kg/m².           Drains       Drain  Duration                  Urethral Catheter 08/12/22 1400 Straight-tip 18 Fr. 1 day                    Physical Exam  Constitutional:       General: She is not in acute distress.     Appearance: She is well-developed.   HENT:      Head: Normocephalic.   Cardiovascular:      Rate and Rhythm: Normal rate.   Pulmonary:      Effort: Pulmonary effort is normal. No respiratory distress.   Abdominal:      Palpations: Abdomen is soft.   Genitourinary:     Comments: Dickson draining clear yellow urine.   Musculoskeletal:         General: Normal range of motion.      Cervical back: Normal range of motion.   Skin:     General: Skin is warm.   Neurological:      Mental Status: She is alert and oriented to person, place, and time.       Significant Labs:    BMP:  Recent Labs   Lab 08/10/22  1641 08/11/22  0601 08/12/22  0406 08/13/22  0419 08/14/22  0524   *   < > 133* 133* 132*   K 3.5   < > 3.5 4.0 3.8      < > 103 105 103   CO2 20   < > 18* 17* 19*   BUN 53*   < > 51* 47* 45*   CREATININE 0.96   < > 0.8 0.8 0.7   GLUCOSE 112*  --   --   --   --    CALCIUM 8.1*   < > 7.4* 7.7* 7.6*    < > = values in this interval not displayed.       CBC:   Recent Labs   Lab 08/11/22  0603 08/12/22  0406 08/14/22  0524   WBC 29.05*  29.05* 24.50* 24.52*   HGB 9.7*  9.7* 8.1* 7.8*   HCT 31.0*  31.0* 25.7* 24.7*     335 373 324       All pertinent labs results from the past 24 hours have been reviewed.    Significant Imaging:  All pertinent imaging results/findings from the past 24 hours have been reviewed.

## 2022-08-14 NOTE — ASSESSMENT & PLAN NOTE
77F PMH endometrial cancer Stage 1A grade 1 s/p TLH/BSO (2016), she now presents with a pelvic mass with compression of rectosigmoid colon.  Multiple biopsies have been performed including rectal biopsies of the mass resulting as necrotic and inflammatory tissue and vaginal cuff biopsy without any cancerous cells identified.  She is severely malnourished and debilitated with an albumin of 1.4 and prealbumin of 8 on admission.  Has A-fib (previously on Eliquis) last echo 8/11/22 EF 55-60%. Bilateral lower extremity venous US showed Right and Left DVT, occlusive on right side. MRI 8/13/2022 showed two indeterminate enhancing hepatic lesions, largest within the right hepatic dome.  Cannot exclude metastatic disease, recommended further evaluation with tissue sampling.     Plan:  - Malnutrition: Regular diet, Boost TID, TPN; Weekly prealbumin, INR, and triglycerides  - Hold PM lovenox   - NPO at midnight  - IR liver biopsy tomorrow  - Gyn onc consulted for assistance is needed in OR  - Echo EF 55-60%  - Continue zosyn  - Dickson in place   - Regular diet  - PT/OT to work with patient in setting of deconditioned state  - Will continue discussions with family as data is gathered in terms of goals of care for this patient and what operative intervention would look like.

## 2022-08-15 LAB
ABO + RH BLD: NORMAL
ALBUMIN SERPL BCP-MCNC: 1.2 G/DL (ref 3.5–5.2)
ALP SERPL-CCNC: 182 U/L (ref 55–135)
ALT SERPL W/O P-5'-P-CCNC: 9 U/L (ref 10–44)
ANION GAP SERPL CALC-SCNC: 10 MMOL/L (ref 8–16)
ANISOCYTOSIS BLD QL SMEAR: SLIGHT
AST SERPL-CCNC: 14 U/L (ref 10–40)
BASO STIPL BLD QL SMEAR: ABNORMAL
BASOPHILS # BLD AUTO: 0.02 K/UL (ref 0–0.2)
BASOPHILS # BLD AUTO: 0.03 K/UL (ref 0–0.2)
BASOPHILS NFR BLD: 0.1 % (ref 0–1.9)
BASOPHILS NFR BLD: 0.1 % (ref 0–1.9)
BILIRUB SERPL-MCNC: 0.2 MG/DL (ref 0.1–1)
BLD GP AB SCN CELLS X3 SERPL QL: NORMAL
BUN SERPL-MCNC: 45 MG/DL (ref 8–23)
BURR CELLS BLD QL SMEAR: ABNORMAL
CALCIUM SERPL-MCNC: 8.1 MG/DL (ref 8.7–10.5)
CHLORIDE SERPL-SCNC: 102 MMOL/L (ref 95–110)
CO2 SERPL-SCNC: 20 MMOL/L (ref 23–29)
CREAT SERPL-MCNC: 0.7 MG/DL (ref 0.5–1.4)
CRP SERPL-MCNC: 156.4 MG/L (ref 0–8.2)
DIFFERENTIAL METHOD: ABNORMAL
DIFFERENTIAL METHOD: ABNORMAL
EOSINOPHIL # BLD AUTO: 0.1 K/UL (ref 0–0.5)
EOSINOPHIL # BLD AUTO: 0.1 K/UL (ref 0–0.5)
EOSINOPHIL NFR BLD: 0.3 % (ref 0–8)
EOSINOPHIL NFR BLD: 0.4 % (ref 0–8)
ERYTHROCYTE [DISTWIDTH] IN BLOOD BY AUTOMATED COUNT: 19.2 % (ref 11.5–14.5)
ERYTHROCYTE [DISTWIDTH] IN BLOOD BY AUTOMATED COUNT: 19.3 % (ref 11.5–14.5)
EST. GFR  (NO RACE VARIABLE): >60 ML/MIN/1.73 M^2
GLUCOSE SERPL-MCNC: 128 MG/DL (ref 70–110)
HCT VFR BLD AUTO: 25 % (ref 37–48.5)
HCT VFR BLD AUTO: 25 % (ref 37–48.5)
HGB BLD-MCNC: 8.1 G/DL (ref 12–16)
HGB BLD-MCNC: 8.2 G/DL (ref 12–16)
IMM GRANULOCYTES # BLD AUTO: 0.18 K/UL (ref 0–0.04)
IMM GRANULOCYTES # BLD AUTO: 0.18 K/UL (ref 0–0.04)
IMM GRANULOCYTES NFR BLD AUTO: 0.7 % (ref 0–0.5)
IMM GRANULOCYTES NFR BLD AUTO: 0.8 % (ref 0–0.5)
INR PPP: 0.9 (ref 0.8–1.2)
LYMPHOCYTES # BLD AUTO: 1.9 K/UL (ref 1–4.8)
LYMPHOCYTES # BLD AUTO: 2.1 K/UL (ref 1–4.8)
LYMPHOCYTES NFR BLD: 8.3 % (ref 18–48)
LYMPHOCYTES NFR BLD: 8.6 % (ref 18–48)
MAGNESIUM SERPL-MCNC: 2.4 MG/DL (ref 1.6–2.6)
MCH RBC QN AUTO: 27.3 PG (ref 27–31)
MCH RBC QN AUTO: 27.7 PG (ref 27–31)
MCHC RBC AUTO-ENTMCNC: 32.4 G/DL (ref 32–36)
MCHC RBC AUTO-ENTMCNC: 32.8 G/DL (ref 32–36)
MCV RBC AUTO: 84 FL (ref 82–98)
MCV RBC AUTO: 85 FL (ref 82–98)
MONOCYTES # BLD AUTO: 1.1 K/UL (ref 0.3–1)
MONOCYTES # BLD AUTO: 1.2 K/UL (ref 0.3–1)
MONOCYTES NFR BLD: 4.6 % (ref 4–15)
MONOCYTES NFR BLD: 4.7 % (ref 4–15)
NEUTROPHILS # BLD AUTO: 19.4 K/UL (ref 1.8–7.7)
NEUTROPHILS # BLD AUTO: 21.2 K/UL (ref 1.8–7.7)
NEUTROPHILS NFR BLD: 85.7 % (ref 38–73)
NEUTROPHILS NFR BLD: 85.7 % (ref 38–73)
NRBC BLD-RTO: 0 /100 WBC
NRBC BLD-RTO: 0 /100 WBC
PHOSPHATE SERPL-MCNC: 3 MG/DL (ref 2.7–4.5)
PLATELET # BLD AUTO: 298 K/UL (ref 150–450)
PLATELET # BLD AUTO: 333 K/UL (ref 150–450)
PLATELET BLD QL SMEAR: ABNORMAL
PMV BLD AUTO: 9.6 FL (ref 9.2–12.9)
PMV BLD AUTO: 9.7 FL (ref 9.2–12.9)
POCT GLUCOSE: 127 MG/DL (ref 70–110)
POCT GLUCOSE: 128 MG/DL (ref 70–110)
POCT GLUCOSE: 150 MG/DL (ref 70–110)
POCT GLUCOSE: 161 MG/DL (ref 70–110)
POIKILOCYTOSIS BLD QL SMEAR: SLIGHT
POLYCHROMASIA BLD QL SMEAR: ABNORMAL
POTASSIUM SERPL-SCNC: 3.7 MMOL/L (ref 3.5–5.1)
PREALB SERPL-MCNC: 7 MG/DL (ref 20–43)
PROT SERPL-MCNC: 4.9 G/DL (ref 6–8.4)
PROTHROMBIN TIME: 9.9 SEC (ref 9–12.5)
RBC # BLD AUTO: 2.96 M/UL (ref 4–5.4)
RBC # BLD AUTO: 2.97 M/UL (ref 4–5.4)
SODIUM SERPL-SCNC: 132 MMOL/L (ref 136–145)
WBC # BLD AUTO: 22.64 K/UL (ref 3.9–12.7)
WBC # BLD AUTO: 24.74 K/UL (ref 3.9–12.7)

## 2022-08-15 PROCEDURE — 25000003 PHARM REV CODE 250: Performed by: RADIOLOGY

## 2022-08-15 PROCEDURE — 25000003 PHARM REV CODE 250: Performed by: COLON & RECTAL SURGERY

## 2022-08-15 PROCEDURE — 25000003 PHARM REV CODE 250

## 2022-08-15 PROCEDURE — 25000003 PHARM REV CODE 250: Performed by: STUDENT IN AN ORGANIZED HEALTH CARE EDUCATION/TRAINING PROGRAM

## 2022-08-15 PROCEDURE — 20600001 HC STEP DOWN PRIVATE ROOM

## 2022-08-15 PROCEDURE — 99232 PR SUBSEQUENT HOSPITAL CARE,LEVL II: ICD-10-PCS | Mod: ,,, | Performed by: OBSTETRICS & GYNECOLOGY

## 2022-08-15 PROCEDURE — 85025 COMPLETE CBC W/AUTO DIFF WBC: CPT | Performed by: COLON & RECTAL SURGERY

## 2022-08-15 PROCEDURE — 63600175 PHARM REV CODE 636 W HCPCS: Performed by: STUDENT IN AN ORGANIZED HEALTH CARE EDUCATION/TRAINING PROGRAM

## 2022-08-15 PROCEDURE — 86901 BLOOD TYPING SEROLOGIC RH(D): CPT | Performed by: COLON & RECTAL SURGERY

## 2022-08-15 PROCEDURE — 84100 ASSAY OF PHOSPHORUS: CPT | Performed by: STUDENT IN AN ORGANIZED HEALTH CARE EDUCATION/TRAINING PROGRAM

## 2022-08-15 PROCEDURE — 63600175 PHARM REV CODE 636 W HCPCS: Performed by: RADIOLOGY

## 2022-08-15 PROCEDURE — 85025 COMPLETE CBC W/AUTO DIFF WBC: CPT | Mod: 91 | Performed by: STUDENT IN AN ORGANIZED HEALTH CARE EDUCATION/TRAINING PROGRAM

## 2022-08-15 PROCEDURE — 86140 C-REACTIVE PROTEIN: CPT | Performed by: STUDENT IN AN ORGANIZED HEALTH CARE EDUCATION/TRAINING PROGRAM

## 2022-08-15 PROCEDURE — 83735 ASSAY OF MAGNESIUM: CPT | Performed by: STUDENT IN AN ORGANIZED HEALTH CARE EDUCATION/TRAINING PROGRAM

## 2022-08-15 PROCEDURE — 36415 COLL VENOUS BLD VENIPUNCTURE: CPT | Performed by: COLON & RECTAL SURGERY

## 2022-08-15 PROCEDURE — B4185 PARENTERAL SOL 10 GM LIPIDS: HCPCS | Performed by: STUDENT IN AN ORGANIZED HEALTH CARE EDUCATION/TRAINING PROGRAM

## 2022-08-15 PROCEDURE — 94761 N-INVAS EAR/PLS OXIMETRY MLT: CPT

## 2022-08-15 PROCEDURE — A4216 STERILE WATER/SALINE, 10 ML: HCPCS | Performed by: COLON & RECTAL SURGERY

## 2022-08-15 PROCEDURE — A4217 STERILE WATER/SALINE, 500 ML: HCPCS | Performed by: STUDENT IN AN ORGANIZED HEALTH CARE EDUCATION/TRAINING PROGRAM

## 2022-08-15 PROCEDURE — 84134 ASSAY OF PREALBUMIN: CPT | Performed by: STUDENT IN AN ORGANIZED HEALTH CARE EDUCATION/TRAINING PROGRAM

## 2022-08-15 PROCEDURE — 80053 COMPREHEN METABOLIC PANEL: CPT | Performed by: STUDENT IN AN ORGANIZED HEALTH CARE EDUCATION/TRAINING PROGRAM

## 2022-08-15 PROCEDURE — 85610 PROTHROMBIN TIME: CPT | Performed by: STUDENT IN AN ORGANIZED HEALTH CARE EDUCATION/TRAINING PROGRAM

## 2022-08-15 PROCEDURE — 99232 SBSQ HOSP IP/OBS MODERATE 35: CPT | Mod: ,,, | Performed by: OBSTETRICS & GYNECOLOGY

## 2022-08-15 RX ORDER — LIDOCAINE HYDROCHLORIDE 10 MG/ML
INJECTION INFILTRATION; PERINEURAL CODE/TRAUMA/SEDATION MEDICATION
Status: COMPLETED | OUTPATIENT
Start: 2022-08-15 | End: 2022-08-15

## 2022-08-15 RX ORDER — MIDAZOLAM HYDROCHLORIDE 1 MG/ML
INJECTION INTRAMUSCULAR; INTRAVENOUS CODE/TRAUMA/SEDATION MEDICATION
Status: COMPLETED | OUTPATIENT
Start: 2022-08-15 | End: 2022-08-15

## 2022-08-15 RX ORDER — FENTANYL CITRATE 50 UG/ML
INJECTION, SOLUTION INTRAMUSCULAR; INTRAVENOUS CODE/TRAUMA/SEDATION MEDICATION
Status: COMPLETED | OUTPATIENT
Start: 2022-08-15 | End: 2022-08-15

## 2022-08-15 RX ADMIN — METHOCARBAMOL 500 MG: 500 TABLET ORAL at 11:08

## 2022-08-15 RX ADMIN — MUPIROCIN: 20 OINTMENT TOPICAL at 09:08

## 2022-08-15 RX ADMIN — MIDAZOLAM HYDROCHLORIDE 0.5 MG: 1 INJECTION, SOLUTION INTRAMUSCULAR; INTRAVENOUS at 03:08

## 2022-08-15 RX ADMIN — Medication 10 ML: at 11:08

## 2022-08-15 RX ADMIN — LIDOCAINE HYDROCHLORIDE 5 ML: 10 INJECTION, SOLUTION INFILTRATION; PERINEURAL at 03:08

## 2022-08-15 RX ADMIN — PIPERACILLIN SODIUM AND TAZOBACTAM SODIUM 4.5 G: 4; .5 INJECTION, POWDER, LYOPHILIZED, FOR SOLUTION INTRAVENOUS at 07:08

## 2022-08-15 RX ADMIN — SOYBEAN OIL 250 ML: 20 INJECTION, SOLUTION INTRAVENOUS at 09:08

## 2022-08-15 RX ADMIN — OXYCODONE 2.5 MG: 5 TABLET ORAL at 07:08

## 2022-08-15 RX ADMIN — METOPROLOL SUCCINATE 25 MG: 25 TABLET, EXTENDED RELEASE ORAL at 10:08

## 2022-08-15 RX ADMIN — FENTANYL CITRATE 12.5 MCG: 0.05 INJECTION, SOLUTION INTRAMUSCULAR; INTRAVENOUS at 03:08

## 2022-08-15 RX ADMIN — Medication 10 ML: at 06:08

## 2022-08-15 RX ADMIN — OXYCODONE 5 MG: 5 TABLET ORAL at 11:08

## 2022-08-15 RX ADMIN — MAGNESIUM SULFATE HEPTAHYDRATE: 500 INJECTION, SOLUTION INTRAMUSCULAR; INTRAVENOUS at 09:08

## 2022-08-15 RX ADMIN — PIPERACILLIN SODIUM AND TAZOBACTAM SODIUM 4.5 G: 4; .5 INJECTION, POWDER, LYOPHILIZED, FOR SOLUTION INTRAVENOUS at 08:08

## 2022-08-15 RX ADMIN — Medication 10 ML: at 12:08

## 2022-08-15 RX ADMIN — OXYCODONE 5 MG: 5 TABLET ORAL at 07:08

## 2022-08-15 NOTE — NURSING
Pt off floor for procedure will resume care upon return   Esophageal cancer  Gout  Depression  History of BPH  CVA (cerebral vascular accident)  Esophageal cancer  Non Hodgkin's lymphoma

## 2022-08-15 NOTE — PLAN OF CARE
Patient AAOx3, no distress noted, respirations even and unlabored, will continue to monitor. VSS. Acceptance of education, consents signed, H/P done. Labs reviewed. Patient in IR Room 173 for liver mass biopsy procedure. Warm blankets applied to patient. Patient prepped and draped in sterile fashion.

## 2022-08-15 NOTE — PLAN OF CARE
SW sent referrals to SNF per PT/OT recommendations. Pt stated they would like to prioritize [facility] for placement. Sw will continue to monitor.     Ophelia Castellano LCSW  Case Management/[facility]  [phone]

## 2022-08-15 NOTE — PLAN OF CARE
POC is reviewed and understood by patient. Oriented x4. Pt is to have a liver biopsy today and is currently NPO. Pt has been having consistent rectal bleeding all throughout the night shift. Pt asymptomatic. Adequate urine output. Call bell in reach. HOB elevated. WCTM.

## 2022-08-15 NOTE — PROGRESS NOTES
S:  Endorses abdominal pain this AM, 7/10. Awaiting liver biopsy today. NPO. Patient and family to decide on their preference for surgical intervention after results of liver biopsy.      O:  Temp:  [97.6 °F (36.4 °C)-98.6 °F (37 °C)] 97.9 °F (36.6 °C)  Pulse:  [100-114] 102  Resp:  [14-20] 14  SpO2:  [91 %-95 %] 92 %  BP: ()/(56-75) 120/68    No acute distress AxO x3  Abdomen soft, non-tender     A/P:  - s/p RA-TLH/BSO in 2016 in the setting of Stage IAI Endometrial Cancer  - Pelvic mass of unknown etiology now with concern for invasion into bladder, rectum, and possibly vagina  - Evaluated by staff, Gynecology Oncology available for surgical intervention as planned per primary team  - Agree with medical optimization before surgery attempted due to patient's multiple co morbidities and clinical status  - Thank you for your consult. We will continue to follow along. Please page [phone] with any concerns.    Hiral Roe MD  PGY 2  Obstetrics and Gynecology

## 2022-08-15 NOTE — H&P
Inpatient Radiology Pre-procedure Note    History of Present Illness:  Linda Pierson is a 77 y.o. female who presents for liver mass biopsy.  Admission H&P reviewed.  Past Medical History:   Diagnosis Date    Anemia     Aortic stenosis     Hypertension     Paroxysmal atrial fibrillation     Sleep apnea     Uterine cancer      Past Surgical History:   Procedure Laterality Date    APPENDECTOMY  1991    [employer], [place], [place]    BILATERAL SALPINGO-OOPHORECTOM[place] (BSO) [place]     SECTION  1967    [facility], [place], [place]    GALLBLADDER SURGERY      [facility], [place]    [place] BY[place]  [place]    [facility] [place]    HERNIA REPAIR  1988    Removal of excess fat ([facility], [place], [place])    HERNIA REPAIR  2005    [employer] ([place], [place])    HERNIA REPAIR  2018    [facility], [facility], [place]    HYST[place]ilateral [place]    KNEE SURGERY Right 2004    [employer], ([place], [place])    KNEE SURGERY Left 2013    [employer], ([place])       Review of Systems:   As documented in primary team H&P    Home Meds:   Prior to Admission medications    Medication Sig Start Date End Date Taking? Authorizing Provider   aspirin (ECOTRIN) 81 MG EC tablet Take 81 mg by mouth Daily.    Historical Provider   calcium-vitamin D 600 mg-10 mcg (400 unit) Tab Take 1 tablet by mouth once daily.    Historical Provider   cholecalciferol, vitamin D3, 125 mcg (5,000 unit) Tab Take 5,000 Units by mouth Daily.    Historical Provider   cyanocobalamin (VITAMIN B-12) 1000 MCG tablet Take 1,000 mcg by mouth Daily.    Historical Provider   ferrous sulfate (FEOSOL) 325 mg (65 mg iron) Tab tablet Take 325 mg by mouth Daily.    Historical Provider   metoprolol succinate (TOPROL-XL) 25 MG 24 hr tablet Take 1 tablet by mouth once daily. 22  Historical Provider   oxybutynin (DITROPAN-XL) 10 MG 24 hr tablet Take 1 tablet by  mouth once daily. 6/18/21 6/17/22  Historical Provider     Scheduled Meds:    aspirin  81 mg Oral Daily    fat emulsion 20%  250 mL Intravenous Daily    metoprolol succinate  25 mg Oral Daily    mupirocin   Nasal BID    piperacillin-tazobactam (ZOSYN) IVPB  4.5 g Intravenous Q8H    sodium chloride 0.9%  10 mL Intravenous Q6H     Continuous Infusions:    dextrose 10 % in water (D10W)      dextrose 5 % and 0.9 % NaCl Stopped (08/14/22 1538)    TPN ADULT CENTRAL LINE CUSTOM 45 mL/hr at 08/14/22 2246    TPN ADULT CENTRAL LINE CUSTOM       PRN Meds:acetaminophen, calcium carbonate, dextrose 10 % in water (D10W), dextrose 10%, dextrose 10%, glucagon (human recombinant), loperamide, melatonin, methocarbamoL, naloxone, ondansetron, oxyCODONE, sodium chloride 0.9%, sodium chloride 0.9%, Flushing PICC Protocol **AND** sodium chloride 0.9% **AND** sodium chloride 0.9%  Anticoagulants/Antiplatelets: aspirin    Allergies: Review of patient's allergies indicates:  No Known Allergies  Sedation Hx: have not been any systemic reactions    Labs:  Recent Labs   Lab 08/15/22  0051   INR 0.9       Recent Labs   Lab 08/15/22  0626   WBC 22.64*   HGB 8.2*   HCT 25.0*   MCV 85         Recent Labs   Lab 08/15/22  0450   *   *   K 3.7      CO2 20*   BUN 45*   CREATININE 0.7   CALCIUM 8.1*   MG 2.4   ALT 9*   AST 14   ALBUMIN 1.2*   BILITOT 0.2         Vitals:  Temp: 98.1 °F (36.7 °C) (08/15/22 0757)  Pulse: (!) 116 (08/15/22 0757)  Resp: 14 (08/15/22 1133)  BP: 109/60 (08/15/22 0757)  SpO2: 96 % (08/15/22 0900)     Physical Exam:  ASA: 2  Mallampati: 2    General: no acute distress  Mental Status: alert and oriented to person, place and time  HEENT: normocephalic, atraumatic  Chest: unlabored breathing  Heart: regular heart rate  Abdomen: nondistended  Extremity: moves all extremities    Plan: liver mass biopsy  Sedation Plan: moderate      Ibis Max MD  Radiology PGY-2

## 2022-08-15 NOTE — NURSING
Patient received resting comfortably in bed in MPU 2 post liver biopsy. (Self-turning mattress engaged.) Procedure site dressing is clean and dry (RUQ, underneath right breast.) Patient denies pain. VSS. Afib on monitor. Patient to recover for 1 hour and return to room 1043.

## 2022-08-15 NOTE — PROGRESS NOTES
[facility]  Colorectal Surgery  Progress Note    Patient Name: Linda Pierson  MRN: [id number]  Admission Date: 8/11/2022  Hospital Length of Stay: 4 days  Attending Physician: SALAS Davis MD    Subjective:     Interval History: Pt with blood clots present with 2 Bms overnight. She is NPO. H/H stable this morning.     Post-Op Info:  * No surgery found *          Medications:  Continuous Infusions:   dextrose 10 % in water (D10W)      dextrose 5 % and 0.9 % NaCl Stopped (08/14/22 1538)    TPN ADULT CENTRAL LINE CUSTOM 45 mL/hr at 08/14/22 2246     Scheduled Meds:   aspirin  81 mg Oral Daily    fat emulsion 20%  250 mL Intravenous Daily    metoprolol succinate  25 mg Oral Daily    mupirocin   Nasal BID    piperacillin-tazobactam (ZOSYN) IVPB  4.5 g Intravenous Q8H    sodium chloride 0.9%  10 mL Intravenous Q6H     PRN Meds:   acetaminophen    calcium carbonate    dextrose 10 % in water (D10W)    dextrose 10%    dextrose 10%    glucagon (human recombinant)    loperamide    melatonin    methocarbamoL    naloxone    ondansetron    oxyCODONE    sodium chloride 0.9%    sodium chloride 0.9%    sodium chloride 0.9%        Objective:     Vital Signs (Most Recent):  Temp: 98.1 °F (36.7 °C) (08/15/22 0757)  Pulse: (!) 116 (08/15/22 0757)  Resp: 15 (08/15/22 0757)  BP: 109/60 (08/15/22 0757)  SpO2: 96 % (08/15/22 0900)   Vital Signs (24h Range):  Temp:  [97.6 °F (36.4 °C)-98.6 °F (37 °C)] 98.1 °F (36.7 °C)  Pulse:  [100-116] 116  Resp:  [14-20] 15  SpO2:  [90 %-96 %] 96 %  BP: ()/(56-75) 109/60     Intake/Output - Last 3 Shifts         08/13 0700  08/14 0659 08/14 0700  08/15 0659 08/15 0700  08/16 0659    P.O. 120 780     I.V. (mL/kg)  464.8 (3.1)     IV Piggyback 289.8 259.8     TPN 1228 1188.8     Total Intake(mL/kg) 1637.8 (10.9) 2693.4 (18)     Urine (mL/kg/hr) 750 (0.2) 1200 (0.3)     Stool 0 0     Total Output 750 1200     Net +887.8 +1493.4            Urine Occurrence 1 x       Stool Occurrence 2 x 6 x             Physical Exam  Constitutional:       Appearance: She is ill-appearing.   HENT:      Head: Normocephalic.      Nose: Nose normal.   Eyes:      General: No scleral icterus.     Pupils: Pupils are equal, round, and reactive to light.   Cardiovascular:      Rate and Rhythm: Normal rate and regular rhythm.      Heart sounds: Normal heart sounds.   Pulmonary:      Effort: Pulmonary effort is normal. No respiratory distress.      Breath sounds: Normal breath sounds. No wheezing or rales.   Abdominal:      Palpations: Abdomen is soft. There is mass.      Tenderness: There is no abdominal tenderness. There is no guarding.      Hernia: A hernia (large ventral hernia) is present.   Genitourinary:     Comments: Dickson in place  Musculoskeletal:         General: Normal range of motion.      Cervical back: Full passive range of motion without pain and neck supple.   Skin:     General: Skin is warm and dry.      Coloration: Skin is pale.   Neurological:      General: No focal deficit present.      Mental Status: She is alert.   Psychiatric:         Mood and Affect: Mood normal.         Behavior: Behavior normal.       Significant Labs:  BMP (Last 3 Results):   Recent Labs   Lab 08/13/22  0419 08/14/22  0524 08/15/22  0450   * 120* 128*   * 132* 132*   K 4.0 3.8 3.7    103 102   CO2 17* 19* 20*   BUN 47* 45* 45*   CREATININE 0.8 0.7 0.7   CALCIUM 7.7* 7.6* 8.1*   MG 2.6 2.4 2.4       CBC (Last 3 Results):   Recent Labs   Lab 08/14/22  0524 08/15/22  0051 08/15/22  0626   WBC 24.52* 24.74* 22.64*   RBC 2.91* 2.97* 2.96*   HGB 7.8* 8.1* 8.2*   HCT 24.7* 25.0* 25.0*    333 298   MCV 85 84 85   MCH 26.8* 27.3 27.7   MCHC 31.6* 32.4 32.8       CMP (Last 3 Results):   Recent Labs   Lab 08/13/22 0419 08/14/22  0524 08/15/22  0450   * 120* 128*   CALCIUM 7.7* 7.6* 8.1*   ALBUMIN 1.2* 1.2* 1.2*   PROT 4.7* 4.7* 4.9*   * 132* 132*   K 4.0 3.8 3.7   CO2 17* 19* 20*     103 102   BUN 47* 45* 45*   CREATININE 0.8 0.7 0.7   ALKPHOS 207* 203* 182*   ALT 10 9* 9*   AST 13 19 14   BILITOT 0.3 0.2 0.2       CRP (Last 3 Results):   Recent Labs   Lab 08/13/22  0419 08/14/22  0524 08/15/22  0450   .7* 157.0* 156.4*       Coagulation:   Recent Labs   Lab 08/13/22  0419 08/15/22  0051   LABPROT  --  9.9   INR  --  0.9   APTT 33.7*  --        LFTs:   Recent Labs   Lab 08/15/22  0450   ALT 9*   AST 14   ALKPHOS 182*   BILITOT 0.2   PROT 4.9*   ALBUMIN 1.2*       Prealbumin (Last 3 Results):   Recent Labs   Lab 08/11/22  0805 08/15/22  0051   PREALBUMIN 8* 7*         Significant Diagnostics:  I have reviewed all pertinent imaging results/findings within the past 24 hours.    Assessment/Plan:     * Pelvic mass  77F PMH endometrial cancer Stage 1A grade 1 s/p TLH/BSO (2016), she now presents with a pelvic mass with compression of rectosigmoid colon.  Multiple biopsies have been performed including rectal biopsies of the mass resulting as necrotic and inflammatory tissue and vaginal cuff biopsy without any cancerous cells identified.  She is severely malnourished and debilitated with an albumin of 1.4 and prealbumin of 8 on admission.  Has A-fib (previously on Eliquis) last echo 8/11/22 EF 55-60%. Bilateral lower extremity venous US showed Right and Left DVT, occlusive on right side. MRI 8/13/2022 showed two indeterminate enhancing hepatic lesions, largest within the right hepatic dome.  Cannot exclude metastatic disease, recommended further evaluation with tissue sampling.     Plan:  - Malnutrition: NPO today for procedure, TPN; Weekly prealbumin, INR, and triglycerides  - Holding lovenox today, possibly resume after IR  - IR consult for liver biopsy, requested frozen and permanent specimens  - Gyn onc consulted for assistance is needed in OR  - Echo EF 55-60%  - Continue zosyn  - Dickson in place   - Regular diet  - PT/OT to work with patient in setting of deconditioned state  -  Will continue discussions with family as data is gathered in terms of goals of care for this patient and what operative intervention would look like.          Jeff Gonzales MD  Colorectal Surgery  [facility]

## 2022-08-15 NOTE — PLAN OF CARE
[facility]  Initial Discharge Assessment       Primary Care Provider: Primary Doctor No    Admission Diagnosis: Abscess of sigmoid colon [K63.0]    Admission Date: 8/11/2022  Expected Discharge Date: 8/19/2022    Discharge Barriers Identified: None    Payor: MEDICARE / Plan: MEDICARE PART A & B / Product Type: Government /     Extended Emergency Contact Information  Primary Emergency Contact: Huy Gonzalez  Home Phone: [phone]  Relation: Significant other  Preferred language: English  Secondary Emergency Contact: Muriel Pierson  Mobile Phone: [phone]  Relation: Daughter    Discharge Plan A: Skilled Nursing Facility  Discharge Plan B: Skilled Nursing Facility      [id number] - [place], [place] - [address] AT [facility] [address]  [address]  [facility] [address]  Phone: [phone] Fax: [phone]      Initial Assessment (most recent)     Adult Discharge Assessment - 08/15/22 1024        Discharge Assessment    Assessment Type Discharge Planning Brief Assessment     Confirmed/corrected address, phone number and insurance Yes     Confirmed Demographics Correct on Facesheet     Source of Information patient     When was your last doctors appointment? 07/12/22     Does patient/caregiver understand observation status Yes     Communicated FADY with patient/caregiver Yes     Reason For Admission Abcess of sigmoid colon     Lives With significant other     Facility Arrived From: Home     Do you expect to return to your current living situation? No     Do you have help at home or someone to help you manage your care at home? Yes     Who are your caregiver(s) and their phone number(s)? Huy-Significant [phone]     Prior to hospitilization cognitive status: Alert/Oriented     Current cognitive status: Alert/Oriented     Walking or Climbing Stairs Difficulty ambulation difficulty, requires equipment;stair climbing difficulty, requires equipment;transferring  difficulty, requires equipment     Mobility Management Pt uses walker and wheelchair at home     Dressing/Bathing Difficulty none     Home Accessibility wheelchair accessible     Home Layout Able to live on 1st floor     Equipment Currently Used at Home walker, rolling;wheelchair     Readmission within 30 days? No     Patient currently being followed by outpatient case management? No     Do you currently have service(s) that help you manage your care at home? No     Do you take prescription medications? Yes     Do you have prescription coverage? Yes     Coverage Medicare     Do you have any problems affording any of your prescribed medications? No     Is the patient taking medications as prescribed? yes     Who is going to help you get home at discharge? Huy-Significant [phone]     How do you get to doctors appointments? family or friend will provide     Are you on dialysis? No     Do you take coumadin? No     Discharge Plan A Skilled Nursing Facility     Discharge Plan B Skilled Nursing Facility     DME Needed Upon Discharge  other (see comments)   Unknown at this time    Discharge Barriers Identified None        Relationship/Environment    Name(s) of Who Lives With Patient Huy-Significant [phone]                      Spoke to pt. Pt lives at home with Huy-Significant [phone]. Post hospital stay significant other will be pt support person and pt. has transportation at d/c with significant. There have been no hospitalizations within the last 30 days per pt. Verified pt PCP and preferred pharmacy. Pt stated not on Coumadin and is not receiving dialysis. All questions answered regarding case management/ discharge planning , pt verbalized understanding. Discharge booklet with SW contact information given to pt.     Ophelia Castellano LCSW  Case Management/[facility]  [phone]

## 2022-08-15 NOTE — NURSING
Upon cleaning patient as she had some loose stool, me and another nurse noticed a lot of bright red blood coming from her rectum along with a huge blood clot that was about the size of half the palm of my hand. It appeared to have traveled to her anterior pelvic area near the crump, but no blood noted in crump bag. No more flank bleeding noticed anywhere else post cleaning her bottom. MD on call notified. Will continue to monitor more frequently for more bleeding.

## 2022-08-15 NOTE — NURSING
Procedure/sedation recovery complete. VSS. Procedure site dressing remains CDI. Denies pain. Report called to primary RN Melva. Awaiting patient transport.

## 2022-08-15 NOTE — CARE UPDATE
RAPID RESPONSE NURSE ROUND       Rounding completed with charge RNLacey. Concern for bloody bowel movement/bleeding from rectum with associated tachycardia, BP stable. CBC, T&S ordered. Will follow up.     Instructed to call [phone] for further concerns or assistance.

## 2022-08-15 NOTE — PT/OT/SLP PROGRESS
Occupational Therapy      Patient Name:  Linda Pierson   MRN:  [id number]    Patient not seen today secondary to Other (Comment) (Pt refused at 1021 despite encouragement including offering to t/f pt to new bed. Pt awaiting liver biospy and stated she would prefer to wait until after procedure for therapy services.). Will follow-up per POC.    Yahaira Cade OT  8/15/2022

## 2022-08-15 NOTE — CARE UPDATE
RAPID RESPONSE NURSE ROUND       Rounding completed with charge RN, Tonya. Pt with 2 Bloody BMs overnight. CBC stable. No hemodynanic instability. Primary team aware. Patient to receive Liver Bx. Would recommend serial H/H's. No concerns verbalized at this time. Instructed to call [phone] for further concerns or assistance.

## 2022-08-15 NOTE — SUBJECTIVE & OBJECTIVE
Subjective:     Interval History: Pt with blood clots present with 2 Bms overnight. She is NPO. H/H stable this morning.     Post-Op Info:  * No surgery found *          Medications:  Continuous Infusions:   dextrose 10 % in water (D10W)      dextrose 5 % and 0.9 % NaCl Stopped (08/14/22 1538)    TPN ADULT CENTRAL LINE CUSTOM 45 mL/hr at 08/14/22 2246     Scheduled Meds:   aspirin  81 mg Oral Daily    fat emulsion 20%  250 mL Intravenous Daily    metoprolol succinate  25 mg Oral Daily    mupirocin   Nasal BID    piperacillin-tazobactam (ZOSYN) IVPB  4.5 g Intravenous Q8H    sodium chloride 0.9%  10 mL Intravenous Q6H     PRN Meds:   acetaminophen    calcium carbonate    dextrose 10 % in water (D10W)    dextrose 10%    dextrose 10%    glucagon (human recombinant)    loperamide    melatonin    methocarbamoL    naloxone    ondansetron    oxyCODONE    sodium chloride 0.9%    sodium chloride 0.9%    sodium chloride 0.9%        Objective:     Vital Signs (Most Recent):  Temp: 98.1 °F (36.7 °C) (08/15/22 0757)  Pulse: (!) 116 (08/15/22 0757)  Resp: 15 (08/15/22 0757)  BP: 109/60 (08/15/22 0757)  SpO2: 96 % (08/15/22 0900)   Vital Signs (24h Range):  Temp:  [97.6 °F (36.4 °C)-98.6 °F (37 °C)] 98.1 °F (36.7 °C)  Pulse:  [100-116] 116  Resp:  [14-20] 15  SpO2:  [90 %-96 %] 96 %  BP: ()/(56-75) 109/60     Intake/Output - Last 3 Shifts         08/13 0700 08/14 0659 08/14 0700  08/15 0659 08/15 0700 08/16 0659    P.O. 120 780     I.V. (mL/kg)  464.8 (3.1)     IV Piggyback 289.8 259.8     TPN 1228 1188.8     Total Intake(mL/kg) 1637.8 (10.9) 2693.4 (18)     Urine (mL/kg/hr) 750 (0.2) 1200 (0.3)     Stool 0 0     Total Output 750 1200     Net +887.8 +1493.4            Urine Occurrence 1 x      Stool Occurrence 2 x 6 x             Physical Exam  Constitutional:       Appearance: She is ill-appearing.   HENT:      Head: Normocephalic.      Nose: Nose normal.   Eyes:      General: No scleral icterus.     Pupils: Pupils are  equal, round, and reactive to light.   Cardiovascular:      Rate and Rhythm: Normal rate and regular rhythm.      Heart sounds: Normal heart sounds.   Pulmonary:      Effort: Pulmonary effort is normal. No respiratory distress.      Breath sounds: Normal breath sounds. No wheezing or rales.   Abdominal:      Palpations: Abdomen is soft. There is mass.      Tenderness: There is no abdominal tenderness. There is no guarding.      Hernia: A hernia (large ventral hernia) is present.   Genitourinary:     Comments: Dickson in place  Musculoskeletal:         General: Normal range of motion.      Cervical back: Full passive range of motion without pain and neck supple.   Skin:     General: Skin is warm and dry.      Coloration: Skin is pale.   Neurological:      General: No focal deficit present.      Mental Status: She is alert.   Psychiatric:         Mood and Affect: Mood normal.         Behavior: Behavior normal.       Significant Labs:  BMP (Last 3 Results):   Recent Labs   Lab 08/13/22  0419 08/14/22  0524 08/15/22  0450   * 120* 128*   * 132* 132*   K 4.0 3.8 3.7    103 102   CO2 17* 19* 20*   BUN 47* 45* 45*   CREATININE 0.8 0.7 0.7   CALCIUM 7.7* 7.6* 8.1*   MG 2.6 2.4 2.4       CBC (Last 3 Results):   Recent Labs   Lab 08/14/22  0524 08/15/22  0051 08/15/22  0626   WBC 24.52* 24.74* 22.64*   RBC 2.91* 2.97* 2.96*   HGB 7.8* 8.1* 8.2*   HCT 24.7* 25.0* 25.0*    333 298   MCV 85 84 85   MCH 26.8* 27.3 27.7   MCHC 31.6* 32.4 32.8       CMP (Last 3 Results):   Recent Labs   Lab 08/13/22 0419 08/14/22  0524 08/15/22  0450   * 120* 128*   CALCIUM 7.7* 7.6* 8.1*   ALBUMIN 1.2* 1.2* 1.2*   PROT 4.7* 4.7* 4.9*   * 132* 132*   K 4.0 3.8 3.7   CO2 17* 19* 20*    103 102   BUN 47* 45* 45*   CREATININE 0.8 0.7 0.7   ALKPHOS 207* 203* 182*   ALT 10 9* 9*   AST 13 19 14   BILITOT 0.3 0.2 0.2       CRP (Last 3 Results):   Recent Labs   Lab 08/13/22  0419 08/14/22  0524 08/15/22  045    .7* 157.0* 156.4*       Coagulation:   Recent Labs   Lab 08/13/22  0419 08/15/22  0051   LABPROT  --  9.9   INR  --  0.9   APTT 33.7*  --        LFTs:   Recent Labs   Lab 08/15/22  0450   ALT 9*   AST 14   ALKPHOS 182*   BILITOT 0.2   PROT 4.9*   ALBUMIN 1.2*       Prealbumin (Last 3 Results):   Recent Labs   Lab 08/11/22  0805 08/15/22  0051   PREALBUMIN 8* 7*         Significant Diagnostics:  I have reviewed all pertinent imaging results/findings within the past 24 hours.

## 2022-08-15 NOTE — NURSING
Pt had another episode of rectal bleeding. Bright red blood noted oozing from rectum traveling to anterior groin area. I would assume about 100cc of blood from rectum. A few small clots noted. Pt asymptomatic. Pt states no feeling of dizziness or lightheadedness, or nausea. Pt's BP was very stable with a SBP of 118. Pt was tachycardic with a HR of 113. MD, charge nurse, and rapid response notified of rectal bleeding. Lab orders made and collected. MOLINA.

## 2022-08-15 NOTE — ASSESSMENT & PLAN NOTE
77F PMH endometrial cancer Stage 1A grade 1 s/p TLH/BSO (2016), she now presents with a pelvic mass with compression of rectosigmoid colon.  Multiple biopsies have been performed including rectal biopsies of the mass resulting as necrotic and inflammatory tissue and vaginal cuff biopsy without any cancerous cells identified.  She is severely malnourished and debilitated with an albumin of 1.4 and prealbumin of 8 on admission.  Has A-fib (previously on Eliquis) last echo 8/11/22 EF 55-60%. Bilateral lower extremity venous US showed Right and Left DVT, occlusive on right side. MRI 8/13/2022 showed two indeterminate enhancing hepatic lesions, largest within the right hepatic dome.  Cannot exclude metastatic disease, recommended further evaluation with tissue sampling.     Plan:  - Malnutrition: NPO today for procedure, TPN; Weekly prealbumin, INR, and triglycerides  - Holding lovenox today, possibly resume after IR  - IR consult for liver biopsy, requested frozen and permanent specimens  - Gyn onc consulted for assistance is needed in OR  - Echo EF 55-60%  - Continue zosyn  - Dickson in place   - Regular diet  - PT/OT to work with patient in setting of deconditioned state  - Will continue discussions with family as data is gathered in terms of goals of care for this patient and what operative intervention would look like.

## 2022-08-15 NOTE — NURSING
Pt had c/o excessive pain. MD states okay to give oral pain med before liver biopsy. Pt's family prefers that pt only has half of Oxy as she has a history of hallucinations, and excessive drowsiness. Day nurse witnessed only half of med given and the other half wasted.

## 2022-08-15 NOTE — PROCEDURES
Radiology Post-Procedure Note    Pre Op Diagnosis: Liver mass    Post Op Diagnosis: Same    Procedure: Liver mass biopsy    Procedure performed by: Rainer Owen MD    Written Informed Consent Obtained: Yes  Specimen Removed: YES 15 x 18 gauge cores  Estimated Blood Loss: Minimal    Findings:   Under US and CT guidance, 17/18 gauge biopsy system was used to sample right liver lobe mass. Tract embolized with Gelfoam slurry. No complications. See dictation.    Patient tolerated procedure well.    Rainer Owen M.D.  Interventional Radiology  Department of Radiology  Pager: [phone]

## 2022-08-15 NOTE — PLAN OF CARE
Liver mass biopsy procedure completed. Patient tolerated well. Patient AAOx3, no distress noted, respirations even and unlabored, will continue to monitor. VSS. Right abdominal site clean, dry, and intact; no bleeding or hematoma noted. Patient to be transferred to MPU for 1 hour post-procedural recovery per MD. Report to be given at bedside to RN.

## 2022-08-15 NOTE — PT/OT/SLP PROGRESS
"Physical Therapy Missed Treatment Note      Patient Name:  Linda Pierson   MRN:  [id number]  Admitting Diagnosis:  Pelvic mass   Recent Surgery: * No surgery found *    Admit Date: 8/11/2022  Length of Stay: 4 days    Patient not seen today due to pt refusal. Per pt, "I'm not doing anything until after this procedure." Linda Pierson's plan of care and PT goals reviewed on this date and remain appropriate. Will follow-up for progressive mobility pending continued medical stability and patient participation.    Gaye Wilson, PT, DPT  8/15/2022   Pager: [phone]    "

## 2022-08-16 LAB
ALBUMIN SERPL BCP-MCNC: 1.2 G/DL (ref 3.5–5.2)
ALP SERPL-CCNC: 2070 U/L (ref 55–135)
ALT SERPL W/O P-5'-P-CCNC: 176 U/L (ref 10–44)
ANION GAP SERPL CALC-SCNC: 10 MMOL/L (ref 8–16)
AST SERPL-CCNC: 781 U/L (ref 10–40)
BASOPHILS # BLD AUTO: 0.01 K/UL (ref 0–0.2)
BASOPHILS NFR BLD: 0.1 % (ref 0–1.9)
BILIRUB SERPL-MCNC: 1.2 MG/DL (ref 0.1–1)
BUN SERPL-MCNC: 49 MG/DL (ref 8–23)
CALCIUM SERPL-MCNC: 8.4 MG/DL (ref 8.7–10.5)
CHLORIDE SERPL-SCNC: 103 MMOL/L (ref 95–110)
CO2 SERPL-SCNC: 21 MMOL/L (ref 23–29)
CREAT SERPL-MCNC: 0.8 MG/DL (ref 0.5–1.4)
CRP SERPL-MCNC: 147.7 MG/L (ref 0–8.2)
DIFFERENTIAL METHOD: ABNORMAL
EOSINOPHIL # BLD AUTO: 0.1 K/UL (ref 0–0.5)
EOSINOPHIL NFR BLD: 0.6 % (ref 0–8)
ERYTHROCYTE [DISTWIDTH] IN BLOOD BY AUTOMATED COUNT: 19.2 % (ref 11.5–14.5)
EST. GFR  (NO RACE VARIABLE): >60 ML/MIN/1.73 M^2
GLUCOSE SERPL-MCNC: 136 MG/DL (ref 70–110)
HCT VFR BLD AUTO: 23.6 % (ref 37–48.5)
HGB BLD-MCNC: 7.5 G/DL (ref 12–16)
IMM GRANULOCYTES # BLD AUTO: 0.12 K/UL (ref 0–0.04)
IMM GRANULOCYTES NFR BLD AUTO: 0.8 % (ref 0–0.5)
LYMPHOCYTES # BLD AUTO: 1.5 K/UL (ref 1–4.8)
LYMPHOCYTES NFR BLD: 10.5 % (ref 18–48)
MAGNESIUM SERPL-MCNC: 2.4 MG/DL (ref 1.6–2.6)
MCH RBC QN AUTO: 27.7 PG (ref 27–31)
MCHC RBC AUTO-ENTMCNC: 31.8 G/DL (ref 32–36)
MCV RBC AUTO: 87 FL (ref 82–98)
MONOCYTES # BLD AUTO: 0.9 K/UL (ref 0.3–1)
MONOCYTES NFR BLD: 6.5 % (ref 4–15)
NEUTROPHILS # BLD AUTO: 11.6 K/UL (ref 1.8–7.7)
NEUTROPHILS NFR BLD: 81.5 % (ref 38–73)
NRBC BLD-RTO: 0 /100 WBC
PHOSPHATE SERPL-MCNC: 3.5 MG/DL (ref 2.7–4.5)
PLATELET # BLD AUTO: 302 K/UL (ref 150–450)
PMV BLD AUTO: 9.8 FL (ref 9.2–12.9)
POCT GLUCOSE: 138 MG/DL (ref 70–110)
POCT GLUCOSE: 140 MG/DL (ref 70–110)
POCT GLUCOSE: 143 MG/DL (ref 70–110)
POCT GLUCOSE: 150 MG/DL (ref 70–110)
POCT GLUCOSE: 159 MG/DL (ref 70–110)
POTASSIUM SERPL-SCNC: 4 MMOL/L (ref 3.5–5.1)
PROT SERPL-MCNC: 5.1 G/DL (ref 6–8.4)
RBC # BLD AUTO: 2.71 M/UL (ref 4–5.4)
SODIUM SERPL-SCNC: 134 MMOL/L (ref 136–145)
WBC # BLD AUTO: 14.25 K/UL (ref 3.9–12.7)

## 2022-08-16 PROCEDURE — 25000003 PHARM REV CODE 250

## 2022-08-16 PROCEDURE — 94761 N-INVAS EAR/PLS OXIMETRY MLT: CPT

## 2022-08-16 PROCEDURE — 84100 ASSAY OF PHOSPHORUS: CPT | Performed by: STUDENT IN AN ORGANIZED HEALTH CARE EDUCATION/TRAINING PROGRAM

## 2022-08-16 PROCEDURE — 25000003 PHARM REV CODE 250: Performed by: STUDENT IN AN ORGANIZED HEALTH CARE EDUCATION/TRAINING PROGRAM

## 2022-08-16 PROCEDURE — A4217 STERILE WATER/SALINE, 500 ML: HCPCS | Performed by: STUDENT IN AN ORGANIZED HEALTH CARE EDUCATION/TRAINING PROGRAM

## 2022-08-16 PROCEDURE — 25000003 PHARM REV CODE 250: Performed by: NURSE PRACTITIONER

## 2022-08-16 PROCEDURE — 97535 SELF CARE MNGMENT TRAINING: CPT

## 2022-08-16 PROCEDURE — A4216 STERILE WATER/SALINE, 10 ML: HCPCS | Performed by: COLON & RECTAL SURGERY

## 2022-08-16 PROCEDURE — 20600001 HC STEP DOWN PRIVATE ROOM

## 2022-08-16 PROCEDURE — 63600175 PHARM REV CODE 636 W HCPCS: Performed by: STUDENT IN AN ORGANIZED HEALTH CARE EDUCATION/TRAINING PROGRAM

## 2022-08-16 PROCEDURE — 83735 ASSAY OF MAGNESIUM: CPT | Performed by: STUDENT IN AN ORGANIZED HEALTH CARE EDUCATION/TRAINING PROGRAM

## 2022-08-16 PROCEDURE — 99233 PR SUBSEQUENT HOSPITAL CARE,LEVL III: ICD-10-PCS | Mod: ,,, | Performed by: INTERNAL MEDICINE

## 2022-08-16 PROCEDURE — 80053 COMPREHEN METABOLIC PANEL: CPT | Performed by: STUDENT IN AN ORGANIZED HEALTH CARE EDUCATION/TRAINING PROGRAM

## 2022-08-16 PROCEDURE — 85025 COMPLETE CBC W/AUTO DIFF WBC: CPT | Performed by: STUDENT IN AN ORGANIZED HEALTH CARE EDUCATION/TRAINING PROGRAM

## 2022-08-16 PROCEDURE — 86140 C-REACTIVE PROTEIN: CPT | Performed by: STUDENT IN AN ORGANIZED HEALTH CARE EDUCATION/TRAINING PROGRAM

## 2022-08-16 PROCEDURE — 97530 THERAPEUTIC ACTIVITIES: CPT

## 2022-08-16 PROCEDURE — 63600175 PHARM REV CODE 636 W HCPCS: Performed by: NURSE PRACTITIONER

## 2022-08-16 PROCEDURE — 25000242 PHARM REV CODE 250 ALT 637 W/ HCPCS: Performed by: NURSE PRACTITIONER

## 2022-08-16 PROCEDURE — 25000003 PHARM REV CODE 250: Performed by: COLON & RECTAL SURGERY

## 2022-08-16 PROCEDURE — 99233 SBSQ HOSP IP/OBS HIGH 50: CPT | Mod: ,,, | Performed by: INTERNAL MEDICINE

## 2022-08-16 PROCEDURE — B4185 PARENTERAL SOL 10 GM LIPIDS: HCPCS | Performed by: NURSE PRACTITIONER

## 2022-08-16 RX ORDER — OXYCODONE HCL 5 MG/5 ML
2.5 SOLUTION, ORAL ORAL EVERY 4 HOURS PRN
Status: DISCONTINUED | OUTPATIENT
Start: 2022-08-16 | End: 2022-08-20 | Stop reason: HOSPADM

## 2022-08-16 RX ORDER — OXYCODONE HCL 5 MG/5 ML
5 SOLUTION, ORAL ORAL EVERY 4 HOURS PRN
Status: DISCONTINUED | OUTPATIENT
Start: 2022-08-16 | End: 2022-08-20 | Stop reason: HOSPADM

## 2022-08-16 RX ORDER — ENOXAPARIN SODIUM 150 MG/ML
150 INJECTION SUBCUTANEOUS
Status: DISCONTINUED | OUTPATIENT
Start: 2022-08-16 | End: 2022-08-16

## 2022-08-16 RX ORDER — ENOXAPARIN SODIUM 150 MG/ML
120 INJECTION SUBCUTANEOUS
Status: DISCONTINUED | OUTPATIENT
Start: 2022-08-16 | End: 2022-08-20 | Stop reason: HOSPADM

## 2022-08-16 RX ADMIN — MAGNESIUM SULFATE HEPTAHYDRATE: 500 INJECTION, SOLUTION INTRAMUSCULAR; INTRAVENOUS at 11:08

## 2022-08-16 RX ADMIN — OXYCODONE HYDROCHLORIDE 5 MG: 5 SOLUTION ORAL at 08:08

## 2022-08-16 RX ADMIN — OXYCODONE 5 MG: 5 TABLET ORAL at 03:08

## 2022-08-16 RX ADMIN — ASPIRIN 81 MG: 81 TABLET, COATED ORAL at 05:08

## 2022-08-16 RX ADMIN — Medication 10 ML: at 12:08

## 2022-08-16 RX ADMIN — PIPERACILLIN SODIUM AND TAZOBACTAM SODIUM 4.5 G: 4; .5 INJECTION, POWDER, LYOPHILIZED, FOR SOLUTION INTRAVENOUS at 03:08

## 2022-08-16 RX ADMIN — METOPROLOL SUCCINATE 25 MG: 25 TABLET, EXTENDED RELEASE ORAL at 09:08

## 2022-08-16 RX ADMIN — ACETAMINOPHEN 1000 MG: 500 TABLET ORAL at 09:08

## 2022-08-16 RX ADMIN — ENOXAPARIN SODIUM 150 MG: 150 INJECTION SUBCUTANEOUS at 12:08

## 2022-08-16 RX ADMIN — ENOXAPARIN SODIUM 120 MG: 150 INJECTION SUBCUTANEOUS at 08:08

## 2022-08-16 RX ADMIN — Medication 10 ML: at 05:08

## 2022-08-16 RX ADMIN — SMOFLIPID 250 ML: 6; 6; 5; 3 INJECTION, EMULSION INTRAVENOUS at 10:08

## 2022-08-16 RX ADMIN — MUPIROCIN: 20 OINTMENT TOPICAL at 09:08

## 2022-08-16 RX ADMIN — Medication 10 ML: at 06:08

## 2022-08-16 RX ADMIN — PIPERACILLIN SODIUM AND TAZOBACTAM SODIUM 4.5 G: 4; .5 INJECTION, POWDER, LYOPHILIZED, FOR SOLUTION INTRAVENOUS at 12:08

## 2022-08-16 RX ADMIN — MUPIROCIN: 20 OINTMENT TOPICAL at 08:08

## 2022-08-16 RX ADMIN — OXYCODONE HYDROCHLORIDE 2.5 MG: 5 SOLUTION ORAL at 12:08

## 2022-08-16 RX ADMIN — PIPERACILLIN SODIUM AND TAZOBACTAM SODIUM 4.5 G: 4; .5 INJECTION, POWDER, LYOPHILIZED, FOR SOLUTION INTRAVENOUS at 08:08

## 2022-08-16 NOTE — PROGRESS NOTES
[facility]  Colorectal Surgery  Progress Note    Patient Name: Linda Pierson  MRN: [id number]  Admission Date: 8/11/2022  Hospital Length of Stay: 5 days  Attending Physician: SALAS Davis MD    Subjective:     Interval History: No acute overnight events, AF, VSS. Patient underwent IR liver biopsy yesterday, pending path. Bedridden. Pain is well controlled. Tolerating CLD    Post-Op Info:  * No surgery found *          Medications:  Continuous Infusions:   dextrose 10 % in water (D10W)      dextrose 5 % and 0.9 % NaCl Stopped (08/15/22 1916)    TPN ADULT CENTRAL LINE CUSTOM 45 mL/hr at 08/15/22 2147     Scheduled Meds:   aspirin  81 mg Oral Daily    fat emulsion 20%  250 mL Intravenous Daily    metoprolol succinate  25 mg Oral Daily    mupirocin   Nasal BID    piperacillin-tazobactam (ZOSYN) IVPB  4.5 g Intravenous Q8H    sodium chloride 0.9%  10 mL Intravenous Q6H     PRN Meds:   acetaminophen    calcium carbonate    dextrose 10 % in water (D10W)    dextrose 10%    dextrose 10%    glucagon (human recombinant)    loperamide    melatonin    methocarbamoL    naloxone    ondansetron    oxyCODONE    sodium chloride 0.9%    sodium chloride 0.9%    sodium chloride 0.9%        Objective:     Vital Signs (Most Recent):  Temp: 97.8 °F (36.6 °C) (08/16/22 0805)  Pulse: (!) 114 (08/16/22 0805)  Resp: 18 (08/16/22 0805)  BP: (!) 124/59 (08/16/22 0805)  SpO2: (!) 91 % (08/16/22 0805)   Vital Signs (24h Range):  Temp:  [97.6 °F (36.4 °C)-98.3 °F (36.8 °C)] 97.8 °F (36.6 °C)  Pulse:  [101-117] 114  Resp:  [14-21] 18  SpO2:  [90 %-97 %] 91 %  BP: ()/(53-68) 124/59     Intake/Output - Last 3 Shifts         08/14 0700  08/15 0659 08/15 0700 08/16 0659 08/16 0700 08/17 0659    P.O. 780 360     I.V. (mL/kg) 464.8 (3.1) 302.3 (2)     IV Piggyback 259.8 261.2 37.6    TPN 1188.8 267.9     Total Intake(mL/kg) 2693.4 (18) 1191.3 (8) 37.6 (0.3)    Urine (mL/kg/hr) 1200 (0.3) 700 (0.2)     Stool 0       Total Output 1200 700     Net +1493.4 +491.3 +37.6           Stool Occurrence 6 x              Physical Exam  Constitutional:       Appearance: She is ill-appearing.   HENT:      Head: Normocephalic.      Nose: Nose normal.   Eyes:      Pupils: Pupils are equal, round, and reactive to light.   Cardiovascular:      Rate and Rhythm: Normal rate and regular rhythm.      Heart sounds: Normal heart sounds.   Pulmonary:      Effort: Pulmonary effort is normal. No respiratory distress.   Abdominal:      Palpations: Abdomen is soft. There is mass.      Tenderness: There is no abdominal tenderness. There is no guarding.      Hernia: A hernia (large ventral hernia) is present.   Genitourinary:     Comments: Dickson in place  Musculoskeletal:         General: Normal range of motion.      Cervical back: Full passive range of motion without pain and neck supple.   Skin:     General: Skin is warm and dry.   Neurological:      General: No focal deficit present.      Mental Status: She is alert.   Psychiatric:         Mood and Affect: Mood normal.         Behavior: Behavior normal.       Significant Labs:  BMP (Last 3 Results):   Recent Labs   Lab 08/14/22  0524 08/15/22  0450 08/16/22  0356   * 128* 136*   * 132* 134*   K 3.8 3.7 4.0    102 103   CO2 19* 20* 21*   BUN 45* 45* 49*   CREATININE 0.7 0.7 0.8   CALCIUM 7.6* 8.1* 8.4*   MG 2.4 2.4 2.4       CBC (Last 3 Results):   Recent Labs   Lab 08/14/22  0524 08/15/22  0051 08/15/22  0626   WBC 24.52* 24.74* 22.64*   RBC 2.91* 2.97* 2.96*   HGB 7.8* 8.1* 8.2*   HCT 24.7* 25.0* 25.0*    333 298   MCV 85 84 85   MCH 26.8* 27.3 27.7   MCHC 31.6* 32.4 32.8       CMP (Last 3 Results):   Recent Labs   Lab 08/14/22  0524 08/15/22  0450 08/16/22  0356   * 128* 136*   CALCIUM 7.6* 8.1* 8.4*   ALBUMIN 1.2* 1.2* 1.2*   PROT 4.7* 4.9* 5.1*   * 132* 134*   K 3.8 3.7 4.0   CO2 19* 20* 21*    102 103   BUN 45* 45* 49*   CREATININE 0.7 0.7 0.8   ALKPHOS  203* 182* 2,070*   ALT 9* 9* 176*   AST 19 14 781*   BILITOT 0.2 0.2 1.2*       CRP (Last 3 Results):   Recent Labs   Lab 08/14/22  0524 08/15/22  0450 08/16/22  0356   .0* 156.4* 147.7*       Coagulation:   Recent Labs   Lab 08/13/22  0419 08/15/22  0051   LABPROT  --  9.9   INR  --  0.9   APTT 33.7*  --        LFTs:   Recent Labs   Lab 08/16/22  0356   *   *   ALKPHOS 2,070*   BILITOT 1.2*   PROT 5.1*   ALBUMIN 1.2*       Prealbumin (Last 3 Results):   Recent Labs   Lab 08/11/22  0805 08/15/22  0051   PREALBUMIN 8* 7*         Significant Diagnostics:  I have reviewed all pertinent imaging results/findings within the past 24 hours.    Assessment/Plan:     * Pelvic mass  77F PMH endometrial cancer Stage 1A grade 1 s/p TLH/BSO (2016), she now presents with a pelvic mass with compression of rectosigmoid colon.  Multiple biopsies have been performed including rectal biopsies of the mass resulting as necrotic and inflammatory tissue and vaginal cuff biopsy without any cancerous cells identified.  She is severely malnourished and debilitated with an albumin of 1.4 and prealbumin of 8 on admission.  Has A-fib (previously on Eliquis) last echo 8/11/22 EF 55-60%. Bilateral lower extremity venous US showed Right and Left DVT, occlusive on right side. MRI 8/13/2022 showed two indeterminate enhancing hepatic lesions, largest within the right hepatic dome.  Cannot exclude metastatic disease, recommended further evaluation with tissue sampling.     Plan:  - Malnutrition: TPN; Weekly prealbumin, INR, and triglycerides  - Resuming lovenox today  - F/u pathology on IR liver specimens  - Gyn onc consulted for assistance is needed in OR  - Echo EF 55-60%  - Continue zosyn  - Dickson in place   - PT/OT to work with patient in setting of deconditioned state  - Will continue discussions with family as data is gathered in terms of goals of care for this patient and what operative intervention would look  like.          Danie Hough MD  Colorectal Surgery  [facility]

## 2022-08-16 NOTE — ASSESSMENT & PLAN NOTE
77F PMH endometrial cancer Stage 1A grade 1 s/p TLH/BSO (2016), she now presents with a pelvic mass with compression of rectosigmoid colon.  Multiple biopsies have been performed including rectal biopsies of the mass resulting as necrotic and inflammatory tissue and vaginal cuff biopsy without any cancerous cells identified.  She is severely malnourished and debilitated with an albumin of 1.4 and prealbumin of 8 on admission.  Has A-fib (previously on Eliquis) last echo 8/11/22 EF 55-60%. Bilateral lower extremity venous US showed Right and Left DVT, occlusive on right side. MRI 8/13/2022 showed two indeterminate enhancing hepatic lesions, largest within the right hepatic dome.  Cannot exclude metastatic disease, recommended further evaluation with tissue sampling.     Plan:  - Malnutrition: TPN; Weekly prealbumin, INR, and triglycerides  - Resuming lovenox today  - F/u pathology on IR liver specimens  - Gyn onc consulted for assistance is needed in OR  - Echo EF 55-60%  - Continue zosyn  - Dickson in place   - PT/OT to work with patient in setting of deconditioned state  - Will continue discussions with family as data is gathered in terms of goals of care for this patient and what operative intervention would look like.

## 2022-08-16 NOTE — SUBJECTIVE & OBJECTIVE
Subjective:     Interval History: No acute overnight events, AF, VSS. Patient underwent IR liver biopsy yesterday, pending path. Bedridden. Pain is well controlled. Tolerating CLD    Post-Op Info:  * No surgery found *          Medications:  Continuous Infusions:   dextrose 10 % in water (D10W)      dextrose 5 % and 0.9 % NaCl Stopped (08/15/22 1916)    TPN ADULT CENTRAL LINE CUSTOM 45 mL/hr at 08/15/22 2147     Scheduled Meds:   aspirin  81 mg Oral Daily    fat emulsion 20%  250 mL Intravenous Daily    metoprolol succinate  25 mg Oral Daily    mupirocin   Nasal BID    piperacillin-tazobactam (ZOSYN) IVPB  4.5 g Intravenous Q8H    sodium chloride 0.9%  10 mL Intravenous Q6H     PRN Meds:   acetaminophen    calcium carbonate    dextrose 10 % in water (D10W)    dextrose 10%    dextrose 10%    glucagon (human recombinant)    loperamide    melatonin    methocarbamoL    naloxone    ondansetron    oxyCODONE    sodium chloride 0.9%    sodium chloride 0.9%    sodium chloride 0.9%        Objective:     Vital Signs (Most Recent):  Temp: 97.8 °F (36.6 °C) (08/16/22 0805)  Pulse: (!) 114 (08/16/22 0805)  Resp: 18 (08/16/22 0805)  BP: (!) 124/59 (08/16/22 0805)  SpO2: (!) 91 % (08/16/22 0805)   Vital Signs (24h Range):  Temp:  [97.6 °F (36.4 °C)-98.3 °F (36.8 °C)] 97.8 °F (36.6 °C)  Pulse:  [101-117] 114  Resp:  [14-21] 18  SpO2:  [90 %-97 %] 91 %  BP: ()/(53-68) 124/59     Intake/Output - Last 3 Shifts         08/14 0700  08/15 0659 08/15 0700  08/16 0659 08/16 0700  08/17 0659    P.O. 780 360     I.V. (mL/kg) 464.8 (3.1) 302.3 (2)     IV Piggyback 259.8 261.2 37.6    TPN 1188.8 267.9     Total Intake(mL/kg) 2693.4 (18) 1191.3 (8) 37.6 (0.3)    Urine (mL/kg/hr) 1200 (0.3) 700 (0.2)     Stool 0      Total Output 1200 700     Net +1493.4 +491.3 +37.6           Stool Occurrence 6 x              Physical Exam  Constitutional:       Appearance: She is ill-appearing.   HENT:      Head: Normocephalic.      Nose: Nose normal.    Eyes:      Pupils: Pupils are equal, round, and reactive to light.   Cardiovascular:      Rate and Rhythm: Normal rate and regular rhythm.      Heart sounds: Normal heart sounds.   Pulmonary:      Effort: Pulmonary effort is normal. No respiratory distress.   Abdominal:      Palpations: Abdomen is soft. There is mass.      Tenderness: There is no abdominal tenderness. There is no guarding.      Hernia: A hernia (large ventral hernia) is present.   Genitourinary:     Comments: Dickson in place  Musculoskeletal:         General: Normal range of motion.      Cervical back: Full passive range of motion without pain and neck supple.   Skin:     General: Skin is warm and dry.   Neurological:      General: No focal deficit present.      Mental Status: She is alert.   Psychiatric:         Mood and Affect: Mood normal.         Behavior: Behavior normal.       Significant Labs:  BMP (Last 3 Results):   Recent Labs   Lab 08/14/22  0524 08/15/22  0450 08/16/22  0356   * 128* 136*   * 132* 134*   K 3.8 3.7 4.0    102 103   CO2 19* 20* 21*   BUN 45* 45* 49*   CREATININE 0.7 0.7 0.8   CALCIUM 7.6* 8.1* 8.4*   MG 2.4 2.4 2.4       CBC (Last 3 Results):   Recent Labs   Lab 08/14/22  0524 08/15/22  0051 08/15/22  0626   WBC 24.52* 24.74* 22.64*   RBC 2.91* 2.97* 2.96*   HGB 7.8* 8.1* 8.2*   HCT 24.7* 25.0* 25.0*    333 298   MCV 85 84 85   MCH 26.8* 27.3 27.7   MCHC 31.6* 32.4 32.8       CMP (Last 3 Results):   Recent Labs   Lab 08/14/22  0524 08/15/22  0450 08/16/22  0356   * 128* 136*   CALCIUM 7.6* 8.1* 8.4*   ALBUMIN 1.2* 1.2* 1.2*   PROT 4.7* 4.9* 5.1*   * 132* 134*   K 3.8 3.7 4.0   CO2 19* 20* 21*    102 103   BUN 45* 45* 49*   CREATININE 0.7 0.7 0.8   ALKPHOS 203* 182* 2,070*   ALT 9* 9* 176*   AST 19 14 781*   BILITOT 0.2 0.2 1.2*       CRP (Last 3 Results):   Recent Labs   Lab 08/14/22  0524 08/15/22  0450 08/16/22  0356   .0* 156.4* 147.7*       Coagulation:   Recent Labs    Lab 08/13/22  0419 08/15/22  0051   LABPROT  --  9.9   INR  --  0.9   APTT 33.7*  --        LFTs:   Recent Labs   Lab 08/16/22  0356   *   *   ALKPHOS 2,070*   BILITOT 1.2*   PROT 5.1*   ALBUMIN 1.2*       Prealbumin (Last 3 Results):   Recent Labs   Lab 08/11/22  0805 08/15/22  0051   PREALBUMIN 8* 7*         Significant Diagnostics:  I have reviewed all pertinent imaging results/findings within the past 24 hours.

## 2022-08-16 NOTE — ASSESSMENT & PLAN NOTE
77 year old female with large pelvic mass of unknown etiology involving the rectum, vaginal cuff, and bladder. Biopsies thus far have been inconclusive. Palliative consulted for GOC. Now s/p liver bx 8/15. Path pending     Code status: Full, not addressed     Surrogate decision maker: Legal NOK is patient's 6 children. Patient verbalized 8/16 that she would want her significant other and children to make decisions together      GOC/ACP  8/16/22 Met with patient at bedside. Re-introduced palliative medicine since patient was unable to participate. Discussed my worry that despite best efforts with therapy and boosting nutrition patient will never be a candidate for an extensive surgery. Patient expressed understanding. Patient is hopeful that the bx results will lead to potential cancer treatment options that will improve her quality of life and give her as much meaningful time as possible. Explained that we share in her hope.     -Will follow up once path results and options are known       8/12/22  -Met with patient, significant other, 1 son, and daughter in law at bedside. Patient too sleepy to participate. Introduced palliative medicine. Significant other shared their journey since mass was first discovered in May. Patient has had a gradual decline since this time with a sharp decline over the past month. Prior to this patient is described as very active and always on the go. Family understands she cannot undergo surgery at this time. Family is hoping that patient will get stronger with TPN and PT/OT. They are hopeful that a tissue dx can be obtained. They are open to continued discussions as more is known.

## 2022-08-16 NOTE — PROGRESS NOTES
S:  Family members at bedside. Endorses mild abdominal pain this AM. POD#1 s/p liver biopsy per IR. Continues to be on TPN.      O:  Temp:  [97.6 °F (36.4 °C)-98.3 °F (36.8 °C)] 98 °F (36.7 °C)  Pulse:  [101-117] 112  Resp:  [14-21] 18  SpO2:  [90 %-97 %] 93 %  BP: ()/(53-68) 118/60    No acute distress AxO x3  Abdomen soft, non-tender     A/P:  - s/p RA-TLH/BSO in 2016 in the setting of Stage IAI Endometrial Cancer  - Pelvic mass of unknown etiology now with concern for invasion into bladder, rectum, and possibly vagina  - Evaluated by staff, Gynecology Oncology available for surgical intervention as planned per primary team  - Await liver biopsy pathology  - Agree with medical optimization before surgery attempted due to patient's multiple co morbidities and clinical status  - Thank you for your consult. We will continue to follow along. Please page [phone] with any concerns.    Katherine Boecking MD   Ob/Gyn PGY-3

## 2022-08-16 NOTE — PROGRESS NOTES
[facility] - [facility]  Palliative Medicine  Progress Note    Patient Name: Linda Pierson  MRN: [id number]  Admission Date: 8/11/2022  Hospital Length of Stay: 5 days  Code Status: Full Code   Attending Provider: SALAS Davis MD  Consulting Provider: Juanita Hood MD  Primary Care Physician: Primary Doctor No  Principal Problem:Pelvic mass    Patient information was obtained from patient, spouse/SO and primary team.      Assessment/Plan:     Palliative care encounter  77 year old female with large pelvic mass of unknown etiology involving the rectum, vaginal cuff, and bladder. Biopsies thus far have been inconclusive. Palliative consulted for GOC. Now s/p liver bx 8/15. Path pending     Code status: Full, not addressed     Surrogate decision maker: Legal NOK is patient's 6 children. Patient verbalized 8/16 that she would want her significant other and children to make decisions together      GOC/ACP  8/16/22 Met with patient at bedside. Re-introduced palliative medicine since patient was unable to participate. Discussed my worry that despite best efforts with therapy and boosting nutrition patient will never be a candidate for an extensive surgery. Patient expressed understanding. Patient is hopeful that the bx results will lead to potential cancer treatment options that will improve her quality of life and give her as much meaningful time as possible. Explained that we share in her hope.     -Will follow up once path results and options are known       8/12/22  -Met with patient, significant other, 1 son, and daughter in law at bedside. Patient too sleepy to participate. Introduced palliative medicine. Significant other shared their journey since mass was first discovered in May. Patient has had a gradual decline since this time with a sharp decline over the past month. Prior to this patient is described as very active and always on the go. Family understands she cannot undergo surgery at this time. Family is  hoping that patient will get stronger with TPN and PT/OT. They are hopeful that a tissue dx can be obtained. They are open to continued discussions as more is known.             I will follow-up with patient. Please contact us if you have any additional questions.    Subjective:     Chief Complaint: No chief complaint on file.      HPI:   77 y.o. with history of Afib, Aortic stenosis, MO, with history of Stage IAG1 endometrial cancer s/p robotic hysterectomy and staging 2016. Pt now with large pelvic mass involving the rectum, bladder and vaginal cuff. Multiple biopsies have been inconclusive. Patient has had significant functional decline over the past month to the point of being bedbound. Appetite has declined. Concern that patient is too high risk for exenteration. Primary team working on improving functional status. Palliative consulted for [facility]      Hospital Course:  No notes on file    Interval Hx: Patient much more alert compared to last visit. S/p liver bx 8/15/22. Results pending. Patient reports feeling fatigued. Did not work with therapy yesterday. Remains on TPN. Reports poor appetite and not liking the food at [facility]. Significant other at bedside     Past Medical History:   Diagnosis Date    Anemia     Aortic stenosis     Hypertension     Paroxysmal atrial fibrillation     Sleep apnea     Uterine cancer        Past Surgical History:   Procedure Laterality Date    [place] [facility] [place]    BILATERAL SALPINGO-OOPHOR[place]) [place]     [place]  1967    [place]    [place]    [place]    [facility] [place]    HERNIA REPAIR  1988    [place]    [place]    [facility] [place]    [place]    [facility]    HYSTERECTOM[place]  [place]ilateral 09/13/2016    [place]    KNEE SURGERY Right 06/2004    [employer] ([place]    KNEE SURGERY Left 07/2013    [employer] ([place]       Review of patient's allergies indicates:  No Known Allergies    Medications:  Continuous Infusions:   dextrose 10 % in water (D10W)      dextrose 5 % and 0.9 % NaCl Stopped (08/15/22 1916)    TPN ADULT CENTRAL LINE CUSTOM 45 mL/hr at 08/15/22 2147    TPN ADULT CENTRAL LINE CUSTOM       Scheduled Meds:   aspirin  81 mg Oral Daily    enoxaparin  120 mg Subcutaneous Q12H    lipid (SMOFLIPID)  250 mL Intravenous Daily    metoprolol succinate  25 mg Oral Daily    mupirocin   Nasal BID    piperacillin-tazobactam (ZOSYN) IVPB  4.5 g Intravenous Q8H    sodium chloride 0.9%  10 mL Intravenous Q6H     PRN Meds:acetaminophen, calcium carbonate, dextrose 10 % in water (D10W), dextrose 10%, dextrose 10%, glucagon (human recombinant), loperamide, melatonin, methocarbamoL, naloxone, ondansetron, oxyCODONE **AND** oxyCODONE, sodium chloride 0.9%, sodium chloride 0.9%, Flushing PICC Protocol **AND** sodium chloride 0.9% **AND** sodium chloride 0.9%    Family History    None       Tobacco Use    Smoking status: Never Smoker    Smokeless tobacco: Never Used   Substance and Sexual Activity    Alcohol use: Not Currently    Drug use: Not Currently    Sexual activity: Not Currently       Review of Systems   Objective:     Vital Signs (Most Recent):  Temp: 98 °F (36.7 °C) (08/16/22 1340)  Pulse: (!) 112 (08/16/22 1349)  Resp: 20 (08/16/22 1340)  BP: (!) 100/55 (08/16/22 1340)  SpO2: (!) 93 % (08/16/22 1349)   Vital Signs (24h Range):  Temp:  [97.6 °F (36.4 °C)-98.3 °F (36.8 °C)] 98 °F (36.7 °C)  Pulse:  [101-120] 112  Resp:  [15-21] 20  SpO2:  [90 %-97 %] 93 %  BP: ()/(53-67) 100/55     Weight: (!) 149.7 kg (330 lb)  Body mass index is 54.91 kg/m².    Physical Exam  Vitals and nursing note reviewed.   Constitutional:       General: She is not in acute distress.      Appearance: She is ill-appearing.   HENT:      Head: Normocephalic.      Right Ear: External ear normal.      Left Ear: External ear normal.      Nose: Nose normal.      Mouth/Throat:      Mouth: Mucous membranes are moist.   Eyes:      Pupils: Pupils are equal, round, and reactive to light.   Cardiovascular:      Rate and Rhythm: Tachycardia present.   Pulmonary:      Effort: Pulmonary effort is normal. No respiratory distress.   Abdominal:      General: There is no distension.   Musculoskeletal:      Cervical back: Normal range of motion.      Right lower leg: Edema present.      Left lower leg: Edema present.   Neurological:      Mental Status: She is alert and oriented to person, place, and time.   Psychiatric:         Mood and Affect: Mood normal.       Review of Symptoms      Symptom Assessment (ESAS 0-10 Scale)  Pain:  0  Dyspnea:  0  Anxiety:  0  Nausea:  0  Depression:  0  Anorexia:  0  Fatigue:  6  Insomnia:  0  Restlessness:  0  Agitation:  0 due to Other     CAM / Delirium:  Negative  Constipation:  Negative  Diarrhea:  Positive      Bowel Management Plan (BMP):  Yes      Pain Assessment in Advanced Demential Scale (PAINAD)   Breathing - Independent of vocalization:  0  Negative vocalization:  0  Facial expression:  0  Body language:  0  Consolability:  0  Total:  0    Modified Nafisa Scale:  0    ECOG Performance Status thGthrthathdtheth:th th5th Living Arrangement: Lives with significant other.    Psychosocial/Cultural: Pt has been with significant other for many years. Pt has 6 children. Enjoys playing [employer] and traveling in her RV    Spiritual:  F - Carlee and Belief:  Quaker    Patient too sleepy to answer ESAS       Advance Care Planning   Advance Directives:   Living Will: No    LaPOST: No    Do Not Resuscitate Status: No    Medical Power of : No      Decision Making:  Family answered questions and Patient answered questions       Significant Labs: All pertinent labs within the past 24 hours have been  reviewed.  CBC:   Recent Labs   Lab 08/16/22  1015   WBC 14.25*   HGB 7.5*   HCT 23.6*   MCV 87          BMP:  Recent Labs   Lab 08/16/22  0356   *   *   K 4.0      CO2 21*   BUN 49*   CREATININE 0.8   CALCIUM 8.4*   MG 2.4       LFT:  Lab Results   Component Value Date     (H) 08/16/2022    ALKPHOS 2,070 (H) 08/16/2022    BILITOT 1.2 (H) 08/16/2022     Albumin:   Albumin   Date Value Ref Range Status   08/16/2022 1.2 (L) 3.5 - 5.2 g/dL Final     Protein:   Total Protein   Date Value Ref Range Status   08/16/2022 5.1 (L) 6.0 - 8.4 g/dL Final     Lactic acid:   Lab Results   Component Value Date    LACTATE 2.0 08/11/2022    LACTATE 1.8 08/10/2022       Significant Imaging: I have reviewed all pertinent imaging results/findings within the past 24 hours.    CT abdomen/pelvis 6/22  Impression:     1. Large, round heterogeneous soft tissue mass centered within the expected region of the vaginal cuff and likely distending the vagina.  Difficult to delineate fat planes, but appears to compress or directly invade the rectum.  Primary rectal neoplasm thought less likely given patient history.  Recommend correlation with previous sigmoidoscopy.  Additional mass effect on the bladder without definite invasion.  No suspicious lymphadenopathy.  2. Multiple ill-defined hypodensities in the liver.  Recommend further evaluation with contrast-enhanced MRI abdomen in order to exclude metastasis.  3. Bilateral hydronephrosis with distended urinary bladder, likely secondary to outlet obstruction from large pelvic mass.  4. Midline ventral abdominal hernia containing multiple loops of small bowel and mesentery without evidence of complication.  5. Hepatomegaly.  6. Additional findings as above.  This report was flagged in Epic as abnormal.        Juanita Hood MD  Palliative Medicine  [facility]    > 50% of 40  min visit spent in chart review, face to face discussion of goals of care,  symptom  assessment, coordination of care and emotional support

## 2022-08-16 NOTE — PT/OT/SLP PROGRESS
Physical Therapy [id number]    Patient Name:  Linda Pierson   MRN:  [id number]  Admitting Diagnosis:  Pelvic mass   Recent Surgery: * No surgery found *    Admit Date: 8/11/2022  Length of Stay: 5 days    Recommendations:     Discharge Recommendations:  nursing facility, skilled   Discharge Equipment Recommendations: none   Barriers to discharge: current level of assist required    Plan:     During this hospitalization, patient to be seen 3 x/week to address the identified rehab impairments via therapeutic activities, therapeutic exercises, neuromuscular re-education, wheelchair management/training and progress towards the established goals.  · Plan of Care Expires:  09/11/22  · Plan of Care Reviewed with: patient    Assessment:     Linda Pierson is a 77 y.o. female admitted with a medical diagnosis of Pelvic mass. Pt with fair tolerance to today's session. Pt with x2 sessions on this date.     1st session focused on discussion of PT plan and  overall goals of care (pt wanting to get stronger for surgery). Pt's bed was not conducive to mobility for her as it was too wide and did not lower down low enough to the ground. Discussed with pt need to obtain appropriate bed (pt had this bed delivered on 8/14-8/15 but pt was not placed in the bed). Pt was agreeable to therapy obtaining new bed and transferring to new bed.    2nd session focused on transferring pt to appropriate sizewise bed for mobility. Attempted to perform transfer in pt's room but due to pt not being in bariatric room on floor beds were unable to fit in room. Due to this pt was transferred in hallway via drawsheet transfer with pt consent. At end of session pt was repositioned for skin protection and comfort. Pt reports fatigue 2/2 transfer and agreeable to mobilization to EOB next treatment date. Pt remains motivated to improve.    Pt will benefit from SNF after discharge from acute services in order to continue to progress pt's strength, endurance, and  balance to help pt maximize independence at or near PLOF.    Problem List: weakness, impaired endurance, impaired functional mobility, impaired self care skills, gait instability, impaired balance, decreased upper extremity function, decreased lower extremity function, decreased coordination, impaired coordination, impaired fine motor, impaired skin, impaired cardiopulmonary response to activity, edema.  Rehab Prognosis: Good; patient would benefit from acute skilled PT services to address these deficits and reach maximum level of function.      Goals:   Multidisciplinary Problems     Physical Therapy Goals        Problem: Physical Therapy    Goal Priority Disciplines Outcome Goal Variances Interventions   Physical Therapy Goal     PT, PT/OT Ongoing, Progressing     Description: Goals to be met by: 2022     Patient will increase functional independence with mobility by performin. Sit to stand transfer with Moderate Assistance  2. Bed to chair transfer with Maximum Assistance using slide board prn  3. Sitting at edge of bed x15 minutes with Supervision  4. Stand for 2 minutes with Maximum Assistance using LRAD  5. Lower extremity exercise program x30 reps per handout, with independence                     Subjective     RN Brid notified prior to session.  present upon PT entrance into room. Patient agreeable to PT treatment session.    Chief Complaint: pt agreeable to PT session and transfer to new bed  Patient/Family Comments/goals: pt wants to get stronger  Pain/Comfort:  · Pain Rating 1: 0/10  · Pain Rating Post-Intervention 1: 0/10      Objective:     Additional staff present: OT; OT for cotx due to pt's multiple medical comorbidities and functional deficits req'ing two skilled therapists to appropriately progress pt's musculoskeletal strength, neuromuscular control, and endurance while taking into consideration severe medical acuity in the ICU    Patient found HOB elevated with: telemetry,  "peripheral IV, blood pressure cuff   Cognition:   · Alert and Cooperative  · AxOx4  · Command following: Follows multistep verbal commands  · Fluency: clear/fluent  General Precautions: Standard, Cardiac fall   Orthopedic Precautions:N/A   Braces: N/A   Body mass index is 54.91 kg/m².  Oxygen Device: Room Air  Vitals: BP (!) 117/58 (BP Location: Left arm, Patient Position: Lying)   Pulse 104   Temp 97.4 °F (36.3 °C) (Oral)   Resp 18   Ht 5' 5" (1.651 m)   Wt (!) 149.7 kg (330 lb)   SpO2 (!) 91%   BMI 54.91 kg/m²     Outcome Measures:  AM-PAC 6 CLICK MOBILITY  Turning over in bed (including adjusting bedclothes, sheets and blankets)?: 2  Sitting down on and standing up from a chair with arms (e.g., wheelchair, bedside commode, etc.): 1  Moving from lying on back to sitting on the side of the bed?: 2  Moving to and from a bed to a chair (including a wheelchair)?: 1  Need to walk in hospital room?: 1  Climbing 3-5 steps with a railing?: 1  Basic Mobility Total Score: 8     Functional Mobility:    Bed Mobility:   · Rolling/Turning to Left: maximal assistance and 2 persons  · Rolling/Turning to Right: maximal assistance and 2 persons  · Scooting to HOB via supine bridge: total assistance    Transfers:   · Bed <> Bed Transfer: Drawsheet technique with dependence and of 6 persons with slide board   · tfer to the Rt  · Pt able to perform chin tuck to assist with head/trunk mgmt    Transfers/Standing Balance/Gait: deferred 2/2 pt fatigue from extensive time to organize and complete drawsheet transfer to new bed    Education:   Time provided for education, counseling and discussion of health disposition in regards to patient's current status   All questions answered within PT scope of practice and to patient's satisfaction   PT role in POC to address current functional deficits   Pt educated on proper body mechanics, safety techniques, and energy conservation with PT facilitation and cueing throughout " session   Whiteboard updated with therapist name and pt's current mobility status documented above   Patient is appropriate for drawsheet transfer to/from cardiac chair with nursing staff    Patient left HOB elevated with all lines intact, call button in reach, RN notified and  present.    Time Tracking:     PT Received On: 08/16/22   PT Start Time: 1103     PT Stop Time: 1113  PT Start Time: 1356     PT Stop Time: 1445  PT Total Time (min): 10 minutes + 49 min     Billable Minutes:   · Therapeutic Activity 10 minutes (session 1)  · Therapeutic Activity 28 minutes (session 2)    Treatment Type: Evaluation  PT/PTA: PT       Gaye Wilson PT, DPT  8/16/2022  Pager: [phone]

## 2022-08-16 NOTE — SUBJECTIVE & OBJECTIVE
Interval Hx: Patient much more alert compared to last visit. S/p liver bx 8/15/22. Results pending. Patient reports feeling fatigued. Did not work with therapy yesterday. Remains on TPN. Reports poor appetite and not liking the food at [facility]. Significant other at bedside     Past Medical History:   Diagnosis Date    Anemia     Aortic stenosis     Hypertension     Paroxysmal atrial fibrillation     Sleep apnea     Uterine cancer        Past Surgical History:   Procedure Laterality Date    APPENDECTOMY  1991    [employer], [place]    BILATERAL SALPINGO-OOPHORECTOMY (BSO) [place]ilateral [place]    [place]     SECTION  1967    [facility] [place], [place]    GALLBLADDER SURGERY      [facility], [place]    [place]IC BY[place]  [place]    [facility], [place]    HERNIA REPAIR  1988    [facility] [place] ([facility]    HERNIA REPAIR  2005    [employer] ([place], [place])    [place]ERNIA REPAIR  2018    [place]    HYSTERE[place]OMY Bilateral 2016    [place]    KNEE SURGERY Right 2004    [employer], ([place]    KNEE SURGERY Left 2013    [employer] ([place]       Review of patient's allergies indicates:  No Known Allergies    Medications:  Continuous Infusions:   dextrose 10 % in water (D10W)      dextrose 5 % and 0.9 % NaCl Stopped (08/15/22 1916)    TPN ADULT CENTRAL LINE CUSTOM 45 mL/hr at 08/15/22 2147    TPN ADULT CENTRAL LINE CUSTOM       Scheduled Meds:   aspirin  81 mg Oral Daily    enoxaparin  120 mg Subcutaneous Q12H    lipid (SMOFLIPID)  250 mL Intravenous Daily    metoprolol succinate  25 mg Oral Daily    mupirocin   Nasal BID    piperacillin-tazobactam (ZOSYN) IVPB  4.5 g Intravenous Q8H    sodium chloride 0.9%  10 mL Intravenous Q6H     PRN Meds:acetaminophen, calcium carbonate, dextrose 10 % in water (D10W), dextrose 10%, dextrose 10%, glucagon (human recombinant), loperamide, melatonin, methocarbamoL, naloxone,  ondansetron, oxyCODONE **AND** oxyCODONE, sodium chloride 0.9%, sodium chloride 0.9%, Flushing PICC Protocol **AND** sodium chloride 0.9% **AND** sodium chloride 0.9%    Family History    None       Tobacco Use    Smoking status: Never Smoker    Smokeless tobacco: Never Used   Substance and Sexual Activity    Alcohol use: Not Currently    Drug use: Not Currently    Sexual activity: Not Currently       Review of Systems   Objective:     Vital Signs (Most Recent):  Temp: 98 °F (36.7 °C) (08/16/22 1340)  Pulse: (!) 112 (08/16/22 1349)  Resp: 20 (08/16/22 1340)  BP: (!) 100/55 (08/16/22 1340)  SpO2: (!) 93 % (08/16/22 1349)   Vital Signs (24h Range):  Temp:  [97.6 °F (36.4 °C)-98.3 °F (36.8 °C)] 98 °F (36.7 °C)  Pulse:  [101-120] 112  Resp:  [15-21] 20  SpO2:  [90 %-97 %] 93 %  BP: ()/(53-67) 100/55     Weight: (!) 149.7 kg (330 lb)  Body mass index is 54.91 kg/m².    Physical Exam  Vitals and nursing note reviewed.   Constitutional:       General: She is not in acute distress.     Appearance: She is ill-appearing.   HENT:      Head: Normocephalic.      Right Ear: External ear normal.      Left Ear: External ear normal.      Nose: Nose normal.      Mouth/Throat:      Mouth: Mucous membranes are moist.   Eyes:      Pupils: Pupils are equal, round, and reactive to light.   Cardiovascular:      Rate and Rhythm: Tachycardia present.   Pulmonary:      Effort: Pulmonary effort is normal. No respiratory distress.   Abdominal:      General: There is no distension.   Musculoskeletal:      Cervical back: Normal range of motion.      Right lower leg: Edema present.      Left lower leg: Edema present.   Neurological:      Mental Status: She is alert and oriented to person, place, and time.   Psychiatric:         Mood and Affect: Mood normal.       Review of Symptoms      Symptom Assessment (ESAS 0-10 Scale)  Pain:  0  Dyspnea:  0  Anxiety:  0  Nausea:  0  Depression:  0  Anorexia:  0  Fatigue:  6  Insomnia:  0  Restlessness:   0  Agitation:  0 due to Other     CAM / Delirium:  Negative  Constipation:  Negative  Diarrhea:  Positive      Bowel Management Plan (BMP):  Yes      Pain Assessment in Advanced Demential Scale (PAINAD)   Breathing - Independent of vocalization:  0  Negative vocalization:  0  Facial expression:  0  Body language:  0  Consolability:  0  Total:  0    Modified Nafisa Scale:  0    ECOG Performance Status rdGrdrrdarddrderd:rd rd3rd Living Arrangement: Lives with significant other.    Psychosocial/Cultural: Pt has been with significant other for many years. Pt has 6 children. Enjoys playing [employer] and traveling in her RV    Spiritual:  F - Carlee and Belief:  Episcopal    Patient too sleepy to answer ESAS       Advance Care Planning   Advance Directives:   Living Will: No    LaPOST: No    Do Not Resuscitate Status: No    Medical Power of : No      Decision Making:  Family answered questions and Patient answered questions       Significant Labs: All pertinent labs within the past 24 hours have been reviewed.  CBC:   Recent Labs   Lab 08/16/22  1015   WBC 14.25*   HGB 7.5*   HCT 23.6*   MCV 87          BMP:  Recent Labs   Lab 08/16/22  0356   *   *   K 4.0      CO2 21*   BUN 49*   CREATININE 0.8   CALCIUM 8.4*   MG 2.4       LFT:  Lab Results   Component Value Date     (H) 08/16/2022    ALKPHOS 2,070 (H) 08/16/2022    BILITOT 1.2 (H) 08/16/2022     Albumin:   Albumin   Date Value Ref Range Status   08/16/2022 1.2 (L) 3.5 - 5.2 g/dL Final     Protein:   Total Protein   Date Value Ref Range Status   08/16/2022 5.1 (L) 6.0 - 8.4 g/dL Final     Lactic acid:   Lab Results   Component Value Date    LACTATE 2.0 08/11/2022    LACTATE 1.8 08/10/2022       Significant Imaging: I have reviewed all pertinent imaging results/findings within the past 24 hours.    CT abdomen/pelvis 6/22  Impression:     1. Large, round heterogeneous soft tissue mass centered within the expected region of the vaginal cuff and likely  distending the vagina.  Difficult to delineate fat planes, but appears to compress or directly invade the rectum.  Primary rectal neoplasm thought less likely given patient history.  Recommend correlation with previous sigmoidoscopy.  Additional mass effect on the bladder without definite invasion.  No suspicious lymphadenopathy.  2. Multiple ill-defined hypodensities in the liver.  Recommend further evaluation with contrast-enhanced MRI abdomen in order to exclude metastasis.  3. Bilateral hydronephrosis with distended urinary bladder, likely secondary to outlet obstruction from large pelvic mass.  4. Midline ventral abdominal hernia containing multiple loops of small bowel and mesentery without evidence of complication.  5. Hepatomegaly.  6. Additional findings as above.  This report was flagged in Epic as abnormal.

## 2022-08-16 NOTE — PT/OT/SLP PROGRESS
Occupational Therapy   Co-Treatment  Co-treatment with PT for maximal pt participation, safety, and activity tolerance     Name: Linda Pierson  MRN: [id number]  Admitting Diagnosis:  Pelvic mass       Recommendations:     Discharge Recommendations: nursing facility, skilled  Discharge Equipment Recommendations:  none  Barriers to discharge:  Inaccessible home environment, Decreased caregiver support    Assessment:     Linda Pierson is a 77 y.o. female with a medical diagnosis of Pelvic mass.  She presents with impaired ADL and mobility performance deficits. Pt session attempted x2 with first attempt pt needing new Size wise bed for max therapeutic participation due to pt's body habitus and stature in upcoming sessions. On second attempt, pt bed delivered with bed transfer to be ultimately done in hallway today with dependent draw sheet method with multiple nurses assisting. Pt appreciative and repositioned for skin integrity and comfort. At this time, pt is motivated to participate and currently is a high fall risk. Pt would benefit from continued OT skilled services 3x/wk to improve daily living skills to optimize QOL.    Pt is recommended to discharge to SNF at this time.  Performance deficits affecting function are weakness, impaired functional mobility, impaired endurance, gait instability, impaired balance, impaired self care skills, decreased lower extremity function, decreased upper extremity function, decreased safety awareness.     Rehab Prognosis:  Good; patient would benefit from acute skilled OT services to address these deficits and reach maximum level of function.       Plan:     Patient to be seen 3 x/week to address the above listed problems via self-care/home management, therapeutic activities, therapeutic exercises  · Plan of Care Expires: 09/12/22  · Plan of Care Reviewed with: patient    Subjective     Pain/Comfort:  Pain Addressed 1: Reposition, Distraction  Pain Addressed 2: Reposition,  Distraction, Cessation of Activity    Objective:     Communicated with: RN prior to session.  Patient found HOB elevated with telemetry, peripheral IV, pressure relief boots, blood pressure cuff upon OT entry to room.    General Precautions: Standard, fall   Orthopedic Precautions:N/A   Braces: N/A  Respiratory Status: Room air     Occupational Performance:     Bed Mobility:    · Patient completed Rolling/Turning to Left with  dependent and 6 persons  · Patient completed Rolling/Turning to Right with dependent and 6 persons  · Patient completed Scooting/Bridging with dependent and 6 persons     Functional Mobility/Transfers:  · Functional Mobility: Pt draw sheet transfer x6 RNs, PT, therapy supervisor and PT student. Pt repositioned in bed for comfort following transfer.     Activities of Daily Living:  · Upper Body Dressing: total assistance adjusting gown fit during tf  · Toileting: total assistance using drawsheet      AMPA 6 Click ADL: 9    Treatment & Education:  Pt educated on role of occupational therapy, POC, and safety during ADLs and functional mobility. Pt and OT discussed importance of safe, continued mobility to optimize daily living skills. Pt verbalized understanding. White board updated during session. Pt given instruction to call for medical staff/nurse for assistance.       Patient left HOB elevated with all lines intact, RN notified and spouse presentEducation:      GOALS:   Multidisciplinary Problems     Occupational Therapy Goals        Problem: Occupational Therapy    Goal Priority Disciplines Outcome Interventions   Occupational Therapy Goal     OT, PT/OT Ongoing, Progressing    Description: Goals to be met by: 9/12/2022 (1 month)     Patient will increase functional independence with ADLs by performing:    UE Dressing with Minimal Assistance.  Grooming while seated at EOB with Set-up Assistance.  Toileting from bedside commode with Moderate Assistance for hygiene and clothing management.    Sitting at edge of bed x10 minutes with Minimal Assistance.  Rolling to Bilateral with Minimal Assistance.   Supine to sit with Minimal Assistance.  Stand pivot transfers with Maximum Assistance.  Step transfer with Maximum Assistance  Toilet transfer to bedside commode with Maximum Assistance.                         Time Tracking:     OT Date of Treatment: 08/16/22  OT Start Time: 1356 (0980-2905 first attempt)  OT Stop Time: 1445  OT Total Time (min): 49 min + 10 min= 59 min    Billable Minutes:Self Care/Home Management 35 min  Therapeutic Activity 24 min    OT/DESHAWN: OT          8/16/2022

## 2022-08-16 NOTE — NURSING
Skin tears noted beneath midline dressing. Midline removed. Unit charge and RN notified. Consult orders to be placed by unit staff.

## 2022-08-17 LAB
ALBUMIN SERPL BCP-MCNC: 1.3 G/DL (ref 3.5–5.2)
ALP SERPL-CCNC: 2262 U/L (ref 55–135)
ALT SERPL W/O P-5'-P-CCNC: 201 U/L (ref 10–44)
ANION GAP SERPL CALC-SCNC: 10 MMOL/L (ref 8–16)
AST SERPL-CCNC: 540 U/L (ref 10–40)
BASOPHILS # BLD AUTO: 0.03 K/UL (ref 0–0.2)
BASOPHILS NFR BLD: 0.2 % (ref 0–1.9)
BILIRUB SERPL-MCNC: 1.4 MG/DL (ref 0.1–1)
BUN SERPL-MCNC: 51 MG/DL (ref 8–23)
CALCIUM SERPL-MCNC: 8.6 MG/DL (ref 8.7–10.5)
CHLORIDE SERPL-SCNC: 101 MMOL/L (ref 95–110)
CO2 SERPL-SCNC: 23 MMOL/L (ref 23–29)
CREAT SERPL-MCNC: 0.8 MG/DL (ref 0.5–1.4)
CRP SERPL-MCNC: 182.4 MG/L (ref 0–8.2)
DIFFERENTIAL METHOD: ABNORMAL
EOSINOPHIL # BLD AUTO: 0.1 K/UL (ref 0–0.5)
EOSINOPHIL NFR BLD: 0.4 % (ref 0–8)
ERYTHROCYTE [DISTWIDTH] IN BLOOD BY AUTOMATED COUNT: 19.2 % (ref 11.5–14.5)
EST. GFR  (NO RACE VARIABLE): >60 ML/MIN/1.73 M^2
GLUCOSE SERPL-MCNC: 122 MG/DL (ref 70–110)
HCT VFR BLD AUTO: 25.6 % (ref 37–48.5)
HGB BLD-MCNC: 8.1 G/DL (ref 12–16)
IMM GRANULOCYTES # BLD AUTO: 0.34 K/UL (ref 0–0.04)
IMM GRANULOCYTES NFR BLD AUTO: 1.9 % (ref 0–0.5)
LYMPHOCYTES # BLD AUTO: 1.3 K/UL (ref 1–4.8)
LYMPHOCYTES NFR BLD: 7 % (ref 18–48)
MAGNESIUM SERPL-MCNC: 2.4 MG/DL (ref 1.6–2.6)
MCH RBC QN AUTO: 27.4 PG (ref 27–31)
MCHC RBC AUTO-ENTMCNC: 31.6 G/DL (ref 32–36)
MCV RBC AUTO: 87 FL (ref 82–98)
MONOCYTES # BLD AUTO: 1.2 K/UL (ref 0.3–1)
MONOCYTES NFR BLD: 6.6 % (ref 4–15)
NEUTROPHILS # BLD AUTO: 15 K/UL (ref 1.8–7.7)
NEUTROPHILS NFR BLD: 83.9 % (ref 38–73)
NRBC BLD-RTO: 0 /100 WBC
PHOSPHATE SERPL-MCNC: 3.3 MG/DL (ref 2.7–4.5)
PLATELET # BLD AUTO: 302 K/UL (ref 150–450)
PMV BLD AUTO: 10 FL (ref 9.2–12.9)
POCT GLUCOSE: 144 MG/DL (ref 70–110)
POCT GLUCOSE: 145 MG/DL (ref 70–110)
POCT GLUCOSE: 163 MG/DL (ref 70–110)
POTASSIUM SERPL-SCNC: 3.8 MMOL/L (ref 3.5–5.1)
PROT SERPL-MCNC: 5.6 G/DL (ref 6–8.4)
RBC # BLD AUTO: 2.96 M/UL (ref 4–5.4)
SODIUM SERPL-SCNC: 134 MMOL/L (ref 136–145)
TRIGL SERPL-MCNC: 232 MG/DL (ref 30–150)
WBC # BLD AUTO: 17.91 K/UL (ref 3.9–12.7)

## 2022-08-17 PROCEDURE — 99233 PR SUBSEQUENT HOSPITAL CARE,LEVL III: ICD-10-PCS | Mod: ,,, | Performed by: INTERNAL MEDICINE

## 2022-08-17 PROCEDURE — 83735 ASSAY OF MAGNESIUM: CPT | Performed by: STUDENT IN AN ORGANIZED HEALTH CARE EDUCATION/TRAINING PROGRAM

## 2022-08-17 PROCEDURE — 25000003 PHARM REV CODE 250: Performed by: STUDENT IN AN ORGANIZED HEALTH CARE EDUCATION/TRAINING PROGRAM

## 2022-08-17 PROCEDURE — 99232 SBSQ HOSP IP/OBS MODERATE 35: CPT | Mod: ,,, | Performed by: OBSTETRICS & GYNECOLOGY

## 2022-08-17 PROCEDURE — 80053 COMPREHEN METABOLIC PANEL: CPT | Performed by: STUDENT IN AN ORGANIZED HEALTH CARE EDUCATION/TRAINING PROGRAM

## 2022-08-17 PROCEDURE — C1751 CATH, INF, PER/CENT/MIDLINE: HCPCS

## 2022-08-17 PROCEDURE — 27000221 HC OXYGEN, UP TO 24 HOURS

## 2022-08-17 PROCEDURE — 99356 PR PROLONGED SERV,INPATIENT,1ST HR: CPT | Mod: ,,, | Performed by: INTERNAL MEDICINE

## 2022-08-17 PROCEDURE — 94761 N-INVAS EAR/PLS OXIMETRY MLT: CPT

## 2022-08-17 PROCEDURE — 63600175 PHARM REV CODE 636 W HCPCS: Performed by: STUDENT IN AN ORGANIZED HEALTH CARE EDUCATION/TRAINING PROGRAM

## 2022-08-17 PROCEDURE — 25000003 PHARM REV CODE 250: Performed by: COLON & RECTAL SURGERY

## 2022-08-17 PROCEDURE — 86140 C-REACTIVE PROTEIN: CPT | Performed by: STUDENT IN AN ORGANIZED HEALTH CARE EDUCATION/TRAINING PROGRAM

## 2022-08-17 PROCEDURE — 99232 PR SUBSEQUENT HOSPITAL CARE,LEVL II: ICD-10-PCS | Mod: ,,, | Performed by: OBSTETRICS & GYNECOLOGY

## 2022-08-17 PROCEDURE — 99356 PR PROLONGED SERV,INPATIENT,1ST HR: ICD-10-PCS | Mod: ,,, | Performed by: INTERNAL MEDICINE

## 2022-08-17 PROCEDURE — 63600175 PHARM REV CODE 636 W HCPCS: Performed by: NURSE PRACTITIONER

## 2022-08-17 PROCEDURE — 93005 ELECTROCARDIOGRAM TRACING: CPT

## 2022-08-17 PROCEDURE — 99900035 HC TECH TIME PER 15 MIN (STAT)

## 2022-08-17 PROCEDURE — 25000242 PHARM REV CODE 250 ALT 637 W/ HCPCS: Performed by: NURSE PRACTITIONER

## 2022-08-17 PROCEDURE — 99233 SBSQ HOSP IP/OBS HIGH 50: CPT | Mod: ,,, | Performed by: INTERNAL MEDICINE

## 2022-08-17 PROCEDURE — B4185 PARENTERAL SOL 10 GM LIPIDS: HCPCS | Performed by: STUDENT IN AN ORGANIZED HEALTH CARE EDUCATION/TRAINING PROGRAM

## 2022-08-17 PROCEDURE — 84478 ASSAY OF TRIGLYCERIDES: CPT | Performed by: COLON & RECTAL SURGERY

## 2022-08-17 PROCEDURE — 25000003 PHARM REV CODE 250

## 2022-08-17 PROCEDURE — 93010 ELECTROCARDIOGRAM REPORT: CPT | Mod: ,,, | Performed by: INTERNAL MEDICINE

## 2022-08-17 PROCEDURE — 93010 EKG 12-LEAD: ICD-10-PCS | Mod: ,,, | Performed by: INTERNAL MEDICINE

## 2022-08-17 PROCEDURE — 20600001 HC STEP DOWN PRIVATE ROOM

## 2022-08-17 PROCEDURE — A4217 STERILE WATER/SALINE, 500 ML: HCPCS | Performed by: STUDENT IN AN ORGANIZED HEALTH CARE EDUCATION/TRAINING PROGRAM

## 2022-08-17 PROCEDURE — 84100 ASSAY OF PHOSPHORUS: CPT | Performed by: STUDENT IN AN ORGANIZED HEALTH CARE EDUCATION/TRAINING PROGRAM

## 2022-08-17 PROCEDURE — A4216 STERILE WATER/SALINE, 10 ML: HCPCS | Performed by: COLON & RECTAL SURGERY

## 2022-08-17 PROCEDURE — 85025 COMPLETE CBC W/AUTO DIFF WBC: CPT | Performed by: STUDENT IN AN ORGANIZED HEALTH CARE EDUCATION/TRAINING PROGRAM

## 2022-08-17 RX ORDER — METOPROLOL TARTRATE 1 MG/ML
5 INJECTION, SOLUTION INTRAVENOUS ONCE
Status: COMPLETED | OUTPATIENT
Start: 2022-08-17 | End: 2022-08-17

## 2022-08-17 RX ORDER — METOPROLOL TARTRATE 1 MG/ML
5 INJECTION, SOLUTION INTRAVENOUS EVERY 5 MIN PRN
Status: COMPLETED | OUTPATIENT
Start: 2022-08-17 | End: 2022-08-18

## 2022-08-17 RX ADMIN — Medication 10 ML: at 12:08

## 2022-08-17 RX ADMIN — PIPERACILLIN SODIUM AND TAZOBACTAM SODIUM 4.5 G: 4; .5 INJECTION, POWDER, LYOPHILIZED, FOR SOLUTION INTRAVENOUS at 04:08

## 2022-08-17 RX ADMIN — OXYCODONE HYDROCHLORIDE 5 MG: 5 SOLUTION ORAL at 02:08

## 2022-08-17 RX ADMIN — METOROPROLOL TARTRATE 5 MG: 5 INJECTION, SOLUTION INTRAVENOUS at 11:08

## 2022-08-17 RX ADMIN — ASPIRIN 81 MG: 81 TABLET, COATED ORAL at 05:08

## 2022-08-17 RX ADMIN — OXYCODONE HYDROCHLORIDE 5 MG: 5 SOLUTION ORAL at 01:08

## 2022-08-17 RX ADMIN — ENOXAPARIN SODIUM 120 MG: 150 INJECTION SUBCUTANEOUS at 09:08

## 2022-08-17 RX ADMIN — MAGNESIUM SULFATE HEPTAHYDRATE: 500 INJECTION, SOLUTION INTRAMUSCULAR; INTRAVENOUS at 09:08

## 2022-08-17 RX ADMIN — SMOFLIPID 250 ML: 6; 6; 5; 3 INJECTION, EMULSION INTRAVENOUS at 09:08

## 2022-08-17 RX ADMIN — OXYCODONE HYDROCHLORIDE 5 MG: 5 SOLUTION ORAL at 05:08

## 2022-08-17 RX ADMIN — PIPERACILLIN SODIUM AND TAZOBACTAM SODIUM 4.5 G: 4; .5 INJECTION, POWDER, LYOPHILIZED, FOR SOLUTION INTRAVENOUS at 01:08

## 2022-08-17 RX ADMIN — OXYCODONE HYDROCHLORIDE 2.5 MG: 5 SOLUTION ORAL at 09:08

## 2022-08-17 RX ADMIN — METOROPROLOL TARTRATE 5 MG: 5 INJECTION, SOLUTION INTRAVENOUS at 06:08

## 2022-08-17 RX ADMIN — Medication 10 ML: at 06:08

## 2022-08-17 RX ADMIN — METOPROLOL SUCCINATE 25 MG: 25 TABLET, EXTENDED RELEASE ORAL at 09:08

## 2022-08-17 RX ADMIN — METHOCARBAMOL 500 MG: 500 TABLET ORAL at 05:08

## 2022-08-17 RX ADMIN — MUPIROCIN: 20 OINTMENT TOPICAL at 09:08

## 2022-08-17 RX ADMIN — PIPERACILLIN SODIUM AND TAZOBACTAM SODIUM 4.5 G: 4; .5 INJECTION, POWDER, LYOPHILIZED, FOR SOLUTION INTRAVENOUS at 09:08

## 2022-08-17 RX ADMIN — ONDANSETRON 8 MG: 8 TABLET, ORALLY DISINTEGRATING ORAL at 12:08

## 2022-08-17 NOTE — CARE UPDATE
RAPID RESPONSE NURSE ROUND       Rounding completed with EDMUNDO Loo RN, Mandy for tachycardia. No additional concerns verbalized at this time. Instructed to call [phone] for further concerns or assistance.

## 2022-08-17 NOTE — PROGRESS NOTES
08/17/22 1100   WOCN Assessment   WOCN Total Time (mins) 30   Visit Date 08/17/22   Visit Time 1100   Consult Type New   WOCN Speciality Wound   Intervention assessed;changed;chart review;orders   Teaching on-going        Altered Skin Integrity 08/16/22 1435 Left anterior;lower Arm Skin Tear Partial thickness tissue loss. Shallow open ulcer with a red or pink wound bed, without slough. Intact or Open/Ruptured Serum-filled blister.   Date First Assessed/Time First Assessed: 08/16/22 1435   Side: Left  Orientation: anterior;lower  Location: Arm  Is this injury device related?: Yes  Primary Wound Type: Skin Tear  Description of Altered Skin Integrity: Partial thickness tissue loss. ...   Wound Image    Dressing Appearance Dry;Intact   Drainage Amount Scant   Drainage Characteristics/Odor Serosanguineous   Appearance Pink;Yellow   Red (%), Wound Tissue Color 80 %   Yellow (%), Wound Tissue Color 20 %   Periwound Area Ecchymotic   Wound Edges Defined   Wound Length (cm) 2 cm   Wound Width (cm) 4 cm   Wound Depth (cm) 0.1 cm   Wound Volume (cm^3) 0.8 cm^3   Wound Surface Area (cm^2) 8 cm^2   Care Cleansed with:;Sterile normal saline   Dressing Foam;Changed;Silver        Altered Skin Integrity 08/17/22 1100 Left Buttocks Moisture associated dermatitis Partial thickness tissue loss. Shallow open ulcer with a red or pink wound bed, without slough. Intact or Open/Ruptured Serum-filled blister.   Date First Assessed/Time First Assessed: 08/17/22 1100   Side: Left  Location: Buttocks  Is this injury device related?: No  Primary Wound Type: Moisture associated dermatitis  Description of Altered Skin Integrity: Partial thickness tissue loss. Shal...   Description of Altered Skin Integrity Partial thickness tissue loss. Shallow open ulcer with a red or pink wound bed, without slough. Intact or Open/Ruptured Serum-filled blister.   Dressing Appearance Open to air   Drainage Amount None   Appearance Pink   Red (%), Wound Tissue  Color 100 %   Periwound Area Intact   Wound Length (cm) 0.3 cm   Wound Width (cm) 0.5 cm   Wound Depth (cm) 0.1 cm   Wound Volume (cm^3) 0.015 cm^3   Wound Surface Area (cm^2) 0.15 cm^2   Care Cleansed with:;Sterile normal saline   Dressing Foam   Wound consult performed for left arm and left buttock.  Recommendations:  Left arm and left buttock--Foam drsg.  Waffle overlay, turn every 2 hours.  Abdominal folds--apply interdry.  Noted to be on regular bed.  According to nurse; was on bariatric bed previously but was not able to get in and out with therapy.  Turned with assist x 3.  Tolerated well.  Orders placed.  Skin integrity NPDEANNA.

## 2022-08-17 NOTE — PT/OT/SLP PROGRESS
Occupational Therapy      Patient Name:  Linda Pierson   MRN:  [id number]    Patient not seen today for OT services. Will follow up per POC to address therapy deficits.     Yahaira Cade OT  8/17/2022

## 2022-08-17 NOTE — ASSESSMENT & PLAN NOTE
77F PMH endometrial cancer Stage 1A grade 1 s/p TLH/BSO (2016), she now presents with a pelvic mass with compression of rectosigmoid colon.  Multiple biopsies have been performed including rectal biopsies of the mass resulting as necrotic and inflammatory tissue and vaginal cuff biopsy without any cancerous cells identified.  She is severely malnourished and debilitated with an albumin of 1.4 and prealbumin of 8 on admission.  Has A-fib (previously on Eliquis) last echo 8/11/22 EF 55-60%. Bilateral lower extremity venous US showed Right and Left DVT, occlusive on right side. MRI 8/13/2022 showed two indeterminate enhancing hepatic lesions, largest within the right hepatic dome.  Cannot exclude metastatic disease, recommended further evaluation with tissue sampling.     Plan:  - Malnutrition: TPN; Weekly prealbumin, INR, and triglycerides  - Regular diet as tolerated  - PPX: lovenox  - F/u pathology on IR liver specimens  - Gyn onc consulted for assistance is needed in OR  - Continue zosyn  - Dickson in place   - PT/OT to work with patient in setting of deconditioned state  - Will continue discussions with family as data is gathered in terms of goals of care for this patient and what operative intervention would look like.

## 2022-08-17 NOTE — PROGRESS NOTES
S:  Family members at bedside. Endorses mild abdominal pain this AM. POD#2 s/p liver biopsy per IR. Continues to be on TPN and reg diet however patient without appetite. Reports BM overnight.     Family expressing concern for patient leg swelling, they applied Aquafor overnight, they feel her leg swelling has worsened, patient denies leg pain. Family also concerned for sediment in urine, patient denies any new pelvic pain     O:  Temp:  [96.8 °F (36 °C)-98 °F (36.7 °C)] 98 °F (36.7 °C)  Pulse:  [104-129] 111  Resp:  [16-20] 20  SpO2:  [91 %-98 %] 98 %  BP: (100-124)/(55-77) 117/71      No acute distress AxO x3  Abdomen soft, non-tender  Extrem: bilateral lower extremities non pitting edema, skin tight, thickened with skin breakdown and blistering, no areas of tenderness or erythema   : Dickson in place draining velasquez urine, no suprapubic tenderness      A/P:  - s/p RA-TLH/BSO in 2016 in the setting of Stage IAI Endometrial Cancer  - Pelvic mass of unknown etiology now with concern for invasion into bladder, rectum, and possibly vagina  - Evaluated by staff, Gynecology Oncology available for surgical intervention as planned per primary team  - Await liver biopsy pathology  - Agree with medical optimization before surgery attempted due to patient's multiple co morbidities and clinical status  - Thank you for your consult. We will continue to follow along. Please page [phone] with any concerns.     Hiral Roe MD  PGY 2  Obstetrics and Gynecology

## 2022-08-17 NOTE — SUBJECTIVE & OBJECTIVE
Subjective:     Interval History: No acute overnight events, AF, VSS. Path pending. Bedridden. Pain is well controlled. Tolerating regular diet. Denies N/V.    Post-Op Info:  * No surgery found *          Medications:  Continuous Infusions:   dextrose 10 % in water (D10W)      dextrose 5 % and 0.9 % NaCl Stopped (08/15/22 1916)    TPN ADULT CENTRAL LINE CUSTOM 45 mL/hr at 08/16/22 2301    TPN ADULT CENTRAL LINE CUSTOM       Scheduled Meds:   aspirin  81 mg Oral Daily    enoxaparin  120 mg Subcutaneous Q12H    lipid (SMOFLIPID)  250 mL Intravenous Daily    lipid (SMOFLIPID)  250 mL Intravenous Daily    metoprolol succinate  25 mg Oral Daily    mupirocin   Nasal BID    piperacillin-tazobactam (ZOSYN) IVPB  4.5 g Intravenous Q8H    sodium chloride 0.9%  10 mL Intravenous Q6H     PRN Meds:   acetaminophen    calcium carbonate    dextrose 10 % in water (D10W)    dextrose 10%    dextrose 10%    glucagon (human recombinant)    loperamide    melatonin    methocarbamoL    naloxone    ondansetron    oxyCODONE    And    oxyCODONE    sodium chloride 0.9%    sodium chloride 0.9%    sodium chloride 0.9%        Objective:     Vital Signs (Most Recent):  Temp: 98 °F (36.7 °C) (08/17/22 0736)  Pulse: (!) 130 (08/17/22 0736)  Resp: 18 (08/17/22 0945)  BP: 115/63 (08/17/22 0736)  SpO2: (!) 90 % (08/17/22 0736)   Vital Signs (24h Range):  Temp:  [96.8 °F (36 °C)-98 °F (36.7 °C)] 98 °F (36.7 °C)  Pulse:  [104-130] 130  Resp:  [16-20] 18  SpO2:  [90 %-98 %] 90 %  BP: (100-119)/(55-77) 115/63     Intake/Output - Last 3 Shifts         08/15 0700  08/16 0659 08/16 0700  08/17 0659 08/17 0700 08/18 0659    P.O. 360 360     I.V. (mL/kg) 302.3 (2)      IV Piggyback 261.2 37.6 299.7    .9 1112.6     Total Intake(mL/kg) 1191.3 (8) 1510.2 (10.1) 299.7 (2)    Urine (mL/kg/hr) 700 (0.2) 500 (0.1)     Stool  0     Total Output 700 500     Net +491.3 +1010.2 +299.7           Stool Occurrence  0 x             Physical Exam  Constitutional:        Appearance: She is ill-appearing.   HENT:      Head: Normocephalic.      Nose: Nose normal.   Eyes:      Pupils: Pupils are equal, round, and reactive to light.   Cardiovascular:      Rate and Rhythm: Normal rate and regular rhythm.      Heart sounds: Normal heart sounds.   Pulmonary:      Effort: Pulmonary effort is normal. No respiratory distress.   Abdominal:      Palpations: Abdomen is soft. There is mass.      Tenderness: There is no abdominal tenderness. There is no guarding.      Hernia: A hernia (large ventral hernia) is present.   Genitourinary:     Comments: Dickson in place  Musculoskeletal:         General: Normal range of motion.      Cervical back: Full passive range of motion without pain and neck supple.   Skin:     General: Skin is warm and dry.   Neurological:      General: No focal deficit present.      Mental Status: She is alert.   Psychiatric:         Mood and Affect: Mood normal.         Behavior: Behavior normal.       Significant Labs:  BMP (Last 3 Results):   Recent Labs   Lab 08/15/22  0450 08/16/22  0356 08/17/22  0409   * 136* 122*   * 134* 134*   K 3.7 4.0 3.8    103 101   CO2 20* 21* 23   BUN 45* 49* 51*   CREATININE 0.7 0.8 0.8   CALCIUM 8.1* 8.4* 8.6*   MG 2.4 2.4 2.4       CBC (Last 3 Results):   Recent Labs   Lab 08/15/22  0051 08/15/22  0626 08/16/22  1015   WBC 24.74* 22.64* 14.25*   RBC 2.97* 2.96* 2.71*   HGB 8.1* 8.2* 7.5*   HCT 25.0* 25.0* 23.6*    298 302   MCV 84 85 87   MCH 27.3 27.7 27.7   MCHC 32.4 32.8 31.8*       CMP (Last 3 Results):   Recent Labs   Lab 08/15/22  0450 08/16/22  0356 08/17/22  0409   * 136* 122*   CALCIUM 8.1* 8.4* 8.6*   ALBUMIN 1.2* 1.2* 1.3*   PROT 4.9* 5.1* 5.6*   * 134* 134*   K 3.7 4.0 3.8   CO2 20* 21* 23    103 101   BUN 45* 49* 51*   CREATININE 0.7 0.8 0.8   ALKPHOS 182* 2,070* 2,262*   ALT 9* 176* 201*   AST 14 781* 540*   BILITOT 0.2 1.2* 1.4*       CRP (Last 3 Results):   Recent Labs   Lab  08/15/22  0450 08/16/22  0356 08/17/22  0409   .4* 147.7* 182.4*       Coagulation:   Recent Labs   Lab 08/13/22  0419 08/15/22  0051   LABPROT  --  9.9   INR  --  0.9   APTT 33.7*  --        LFTs:   Recent Labs   Lab 08/17/22  0409   *   *   ALKPHOS 2,262*   BILITOT 1.4*   PROT 5.6*   ALBUMIN 1.3*       Prealbumin (Last 3 Results):   Recent Labs   Lab 08/11/22  0805 08/15/22  0051   PREALBUMIN 8* 7*         Significant Diagnostics:  I have reviewed all pertinent imaging results/findings within the past 24 hours.

## 2022-08-17 NOTE — PROGRESS NOTES
[facility]  Colorectal Surgery  Progress Note    Patient Name: Linda Pierson  MRN: [id number]  Admission Date: 8/11/2022  Hospital Length of Stay: 6 days  Attending Physician: SALAS Davis MD    Subjective:     Interval History: No acute overnight events, AF, VSS. Path pending. Bedridden. Pain is well controlled. Tolerating regular diet. Denies N/V.    Post-Op Info:  * No surgery found *          Medications:  Continuous Infusions:   dextrose 10 % in water (D10W)      dextrose 5 % and 0.9 % NaCl Stopped (08/15/22 1916)    TPN ADULT CENTRAL LINE CUSTOM 45 mL/hr at 08/16/22 2301    TPN ADULT CENTRAL LINE CUSTOM       Scheduled Meds:   aspirin  81 mg Oral Daily    enoxaparin  120 mg Subcutaneous Q12H    lipid (SMOFLIPID)  250 mL Intravenous Daily    lipid (SMOFLIPID)  250 mL Intravenous Daily    metoprolol succinate  25 mg Oral Daily    mupirocin   Nasal BID    piperacillin-tazobactam (ZOSYN) IVPB  4.5 g Intravenous Q8H    sodium chloride 0.9%  10 mL Intravenous Q6H     PRN Meds:   acetaminophen    calcium carbonate    dextrose 10 % in water (D10W)    dextrose 10%    dextrose 10%    glucagon (human recombinant)    loperamide    melatonin    methocarbamoL    naloxone    ondansetron    oxyCODONE    And    oxyCODONE    sodium chloride 0.9%    sodium chloride 0.9%    sodium chloride 0.9%        Objective:     Vital Signs (Most Recent):  Temp: 98 °F (36.7 °C) (08/17/22 0736)  Pulse: (!) 130 (08/17/22 0736)  Resp: 18 (08/17/22 0945)  BP: 115/63 (08/17/22 0736)  SpO2: (!) 90 % (08/17/22 0736)   Vital Signs (24h Range):  Temp:  [96.8 °F (36 °C)-98 °F (36.7 °C)] 98 °F (36.7 °C)  Pulse:  [104-130] 130  Resp:  [16-20] 18  SpO2:  [90 %-98 %] 90 %  BP: (100-119)/(55-77) 115/63     Intake/Output - Last 3 Shifts         08/15 0700  08/16 0659 08/16 0700 08/17 0659 08/17 0700 08/18 0659    P.O. 360 360     I.V. (mL/kg) 302.3 (2)      IV Piggyback 261.2 37.6 299.7    .9 1112.6     Total  Intake(mL/kg) 1191.3 (8) 1510.2 (10.1) 299.7 (2)    Urine (mL/kg/hr) 700 (0.2) 500 (0.1)     Stool  0     Total Output 700 500     Net +491.3 +1010.2 +299.7           Stool Occurrence  0 x             Physical Exam  Constitutional:       Appearance: She is ill-appearing.   HENT:      Head: Normocephalic.      Nose: Nose normal.   Eyes:      Pupils: Pupils are equal, round, and reactive to light.   Cardiovascular:      Rate and Rhythm: Normal rate and regular rhythm.      Heart sounds: Normal heart sounds.   Pulmonary:      Effort: Pulmonary effort is normal. No respiratory distress.   Abdominal:      Palpations: Abdomen is soft. There is mass.      Tenderness: There is no abdominal tenderness. There is no guarding.      Hernia: A hernia (large ventral hernia) is present.   Genitourinary:     Comments: Dickson in place  Musculoskeletal:         General: Normal range of motion.      Cervical back: Full passive range of motion without pain and neck supple.   Skin:     General: Skin is warm and dry.   Neurological:      General: No focal deficit present.      Mental Status: She is alert.   Psychiatric:         Mood and Affect: Mood normal.         Behavior: Behavior normal.       Significant Labs:  BMP (Last 3 Results):   Recent Labs   Lab 08/15/22  0450 08/16/22  0356 08/17/22  0409   * 136* 122*   * 134* 134*   K 3.7 4.0 3.8    103 101   CO2 20* 21* 23   BUN 45* 49* 51*   CREATININE 0.7 0.8 0.8   CALCIUM 8.1* 8.4* 8.6*   MG 2.4 2.4 2.4       CBC (Last 3 Results):   Recent Labs   Lab 08/15/22  0051 08/15/22  0626 08/16/22  1015   WBC 24.74* 22.64* 14.25*   RBC 2.97* 2.96* 2.71*   HGB 8.1* 8.2* 7.5*   HCT 25.0* 25.0* 23.6*    298 302   MCV 84 85 87   MCH 27.3 27.7 27.7   MCHC 32.4 32.8 31.8*       CMP (Last 3 Results):   Recent Labs   Lab 08/15/22  0450 08/16/22  0356 08/17/22  0409   * 136* 122*   CALCIUM 8.1* 8.4* 8.6*   ALBUMIN 1.2* 1.2* 1.3*   PROT 4.9* 5.1* 5.6*   * 134* 134*    K 3.7 4.0 3.8   CO2 20* 21* 23    103 101   BUN 45* 49* 51*   CREATININE 0.7 0.8 0.8   ALKPHOS 182* 2,070* 2,262*   ALT 9* 176* 201*   AST 14 781* 540*   BILITOT 0.2 1.2* 1.4*       CRP (Last 3 Results):   Recent Labs   Lab 08/15/22  0450 08/16/22  0356 08/17/22  0409   .4* 147.7* 182.4*       Coagulation:   Recent Labs   Lab 08/13/22  0419 08/15/22  0051   LABPROT  --  9.9   INR  --  0.9   APTT 33.7*  --        LFTs:   Recent Labs   Lab 08/17/22  0409   *   *   ALKPHOS 2,262*   BILITOT 1.4*   PROT 5.6*   ALBUMIN 1.3*       Prealbumin (Last 3 Results):   Recent Labs   Lab 08/11/22  0805 08/15/22  0051   PREALBUMIN 8* 7*         Significant Diagnostics:  I have reviewed all pertinent imaging results/findings within the past 24 hours.    Assessment/Plan:     * Pelvic mass  77F PMH endometrial cancer Stage 1A grade 1 s/p TLH/BSO (2016), she now presents with a pelvic mass with compression of rectosigmoid colon.  Multiple biopsies have been performed including rectal biopsies of the mass resulting as necrotic and inflammatory tissue and vaginal cuff biopsy without any cancerous cells identified.  She is severely malnourished and debilitated with an albumin of 1.4 and prealbumin of 8 on admission.  Has A-fib (previously on Eliquis) last echo 8/11/22 EF 55-60%. Bilateral lower extremity venous US showed Right and Left DVT, occlusive on right side. MRI 8/13/2022 showed two indeterminate enhancing hepatic lesions, largest within the right hepatic dome.  Cannot exclude metastatic disease, recommended further evaluation with tissue sampling.     Plan:  - Malnutrition: TPN; Weekly prealbumin, INR, and triglycerides  - Regular diet as tolerated  - PPX: lovenox  - F/u pathology on IR liver specimens  - Gyn onc consulted for assistance is needed in OR  - Continue zosyn  - Dickson in place   - PT/OT to work with patient in setting of deconditioned state  - Will continue discussions with family as data is  gathered in terms of goals of care for this patient and what operative intervention would look like.          Danie Hough MD  Colorectal Surgery  [facility]

## 2022-08-17 NOTE — PLAN OF CARE
SW in contact with hospice companies near pt home is [place]. Continuing to monitor.     Ophelia Castellano LCSW  Case Management/[facility]  [phone]

## 2022-08-18 PROBLEM — T14.8XXA BLOOD BLISTER: Status: ACTIVE | Noted: 2022-08-18

## 2022-08-18 PROBLEM — L30.8 DERMATITIS ASSOCIATED WITH MOISTURE: Status: ACTIVE | Noted: 2022-08-18

## 2022-08-18 LAB
ALBUMIN SERPL BCP-MCNC: 1.3 G/DL (ref 3.5–5.2)
ALP SERPL-CCNC: 1665 U/L (ref 55–135)
ALT SERPL W/O P-5'-P-CCNC: 115 U/L (ref 10–44)
ANION GAP SERPL CALC-SCNC: 11 MMOL/L (ref 8–16)
AST SERPL-CCNC: 146 U/L (ref 10–40)
BILIRUB SERPL-MCNC: 0.5 MG/DL (ref 0.1–1)
BUN SERPL-MCNC: 58 MG/DL (ref 8–23)
CALCIUM SERPL-MCNC: 8.6 MG/DL (ref 8.7–10.5)
CHLORIDE SERPL-SCNC: 101 MMOL/L (ref 95–110)
CO2 SERPL-SCNC: 23 MMOL/L (ref 23–29)
CREAT SERPL-MCNC: 0.9 MG/DL (ref 0.5–1.4)
CRP SERPL-MCNC: 244.4 MG/L (ref 0–8.2)
EST. GFR  (NO RACE VARIABLE): >60 ML/MIN/1.73 M^2
FACT X PPP CHRO-ACNC: 0.7 IU/ML (ref 0.3–0.7)
GLUCOSE SERPL-MCNC: 139 MG/DL (ref 70–110)
MAGNESIUM SERPL-MCNC: 2.4 MG/DL (ref 1.6–2.6)
PHOSPHATE SERPL-MCNC: 3.6 MG/DL (ref 2.7–4.5)
POCT GLUCOSE: 152 MG/DL (ref 70–110)
POCT GLUCOSE: 152 MG/DL (ref 70–110)
POCT GLUCOSE: 176 MG/DL (ref 70–110)
POCT GLUCOSE: 179 MG/DL (ref 70–110)
POTASSIUM SERPL-SCNC: 3.7 MMOL/L (ref 3.5–5.1)
PROT SERPL-MCNC: 5.5 G/DL (ref 6–8.4)
SODIUM SERPL-SCNC: 135 MMOL/L (ref 136–145)

## 2022-08-18 PROCEDURE — A4216 STERILE WATER/SALINE, 10 ML: HCPCS | Performed by: COLON & RECTAL SURGERY

## 2022-08-18 PROCEDURE — 83735 ASSAY OF MAGNESIUM: CPT | Performed by: STUDENT IN AN ORGANIZED HEALTH CARE EDUCATION/TRAINING PROGRAM

## 2022-08-18 PROCEDURE — 99233 SBSQ HOSP IP/OBS HIGH 50: CPT | Mod: ,,, | Performed by: STUDENT IN AN ORGANIZED HEALTH CARE EDUCATION/TRAINING PROGRAM

## 2022-08-18 PROCEDURE — 99497 ADVNCD CARE PLAN 30 MIN: CPT | Mod: ,,, | Performed by: EMERGENCY MEDICINE

## 2022-08-18 PROCEDURE — B4185 PARENTERAL SOL 10 GM LIPIDS: HCPCS | Performed by: STUDENT IN AN ORGANIZED HEALTH CARE EDUCATION/TRAINING PROGRAM

## 2022-08-18 PROCEDURE — 25000003 PHARM REV CODE 250: Performed by: STUDENT IN AN ORGANIZED HEALTH CARE EDUCATION/TRAINING PROGRAM

## 2022-08-18 PROCEDURE — 25000003 PHARM REV CODE 250

## 2022-08-18 PROCEDURE — 99497 PR ADVNCD CARE PLAN 30 MIN: ICD-10-PCS | Mod: ,,, | Performed by: EMERGENCY MEDICINE

## 2022-08-18 PROCEDURE — 84100 ASSAY OF PHOSPHORUS: CPT | Performed by: STUDENT IN AN ORGANIZED HEALTH CARE EDUCATION/TRAINING PROGRAM

## 2022-08-18 PROCEDURE — 20600001 HC STEP DOWN PRIVATE ROOM

## 2022-08-18 PROCEDURE — 25000242 PHARM REV CODE 250 ALT 637 W/ HCPCS: Performed by: NURSE PRACTITIONER

## 2022-08-18 PROCEDURE — 99223 PR INITIAL HOSPITAL CARE,LEVL III: ICD-10-PCS | Mod: ,,, | Performed by: NURSE PRACTITIONER

## 2022-08-18 PROCEDURE — A4217 STERILE WATER/SALINE, 500 ML: HCPCS | Performed by: STUDENT IN AN ORGANIZED HEALTH CARE EDUCATION/TRAINING PROGRAM

## 2022-08-18 PROCEDURE — 99233 PR SUBSEQUENT HOSPITAL CARE,LEVL III: ICD-10-PCS | Mod: ,,, | Performed by: EMERGENCY MEDICINE

## 2022-08-18 PROCEDURE — 99223 1ST HOSP IP/OBS HIGH 75: CPT | Mod: ,,, | Performed by: NURSE PRACTITIONER

## 2022-08-18 PROCEDURE — 63600175 PHARM REV CODE 636 W HCPCS: Performed by: NURSE PRACTITIONER

## 2022-08-18 PROCEDURE — 99233 PR SUBSEQUENT HOSPITAL CARE,LEVL III: ICD-10-PCS | Mod: ,,, | Performed by: STUDENT IN AN ORGANIZED HEALTH CARE EDUCATION/TRAINING PROGRAM

## 2022-08-18 PROCEDURE — 86140 C-REACTIVE PROTEIN: CPT | Performed by: STUDENT IN AN ORGANIZED HEALTH CARE EDUCATION/TRAINING PROGRAM

## 2022-08-18 PROCEDURE — 63600175 PHARM REV CODE 636 W HCPCS: Performed by: STUDENT IN AN ORGANIZED HEALTH CARE EDUCATION/TRAINING PROGRAM

## 2022-08-18 PROCEDURE — 85520 HEPARIN ASSAY: CPT | Performed by: COLON & RECTAL SURGERY

## 2022-08-18 PROCEDURE — 63600175 PHARM REV CODE 636 W HCPCS: Performed by: COLON & RECTAL SURGERY

## 2022-08-18 PROCEDURE — 99233 SBSQ HOSP IP/OBS HIGH 50: CPT | Mod: ,,, | Performed by: EMERGENCY MEDICINE

## 2022-08-18 PROCEDURE — 25000003 PHARM REV CODE 250: Performed by: COLON & RECTAL SURGERY

## 2022-08-18 PROCEDURE — 80053 COMPREHEN METABOLIC PANEL: CPT | Performed by: STUDENT IN AN ORGANIZED HEALTH CARE EDUCATION/TRAINING PROGRAM

## 2022-08-18 RX ORDER — METOPROLOL TARTRATE 1 MG/ML
10 INJECTION, SOLUTION INTRAVENOUS EVERY 6 HOURS PRN
Status: DISCONTINUED | OUTPATIENT
Start: 2022-08-18 | End: 2022-08-20 | Stop reason: HOSPADM

## 2022-08-18 RX ORDER — METOPROLOL SUCCINATE 50 MG/1
50 TABLET, EXTENDED RELEASE ORAL DAILY
Status: DISCONTINUED | OUTPATIENT
Start: 2022-08-19 | End: 2022-08-20 | Stop reason: HOSPADM

## 2022-08-18 RX ORDER — HYDROMORPHONE HYDROCHLORIDE 1 MG/ML
0.2 INJECTION, SOLUTION INTRAMUSCULAR; INTRAVENOUS; SUBCUTANEOUS ONCE
Status: COMPLETED | OUTPATIENT
Start: 2022-08-19 | End: 2022-08-19

## 2022-08-18 RX ADMIN — OXYCODONE HYDROCHLORIDE 2.5 MG: 5 SOLUTION ORAL at 03:08

## 2022-08-18 RX ADMIN — ENOXAPARIN SODIUM 120 MG: 150 INJECTION SUBCUTANEOUS at 08:08

## 2022-08-18 RX ADMIN — DEXTROSE AND SODIUM CHLORIDE: 5; .9 INJECTION, SOLUTION INTRAVENOUS at 05:08

## 2022-08-18 RX ADMIN — PIPERACILLIN SODIUM AND TAZOBACTAM SODIUM 4.5 G: 4; .5 INJECTION, POWDER, LYOPHILIZED, FOR SOLUTION INTRAVENOUS at 08:08

## 2022-08-18 RX ADMIN — Medication 10 ML: at 06:08

## 2022-08-18 RX ADMIN — OXYCODONE HYDROCHLORIDE 5 MG: 5 SOLUTION ORAL at 11:08

## 2022-08-18 RX ADMIN — PIPERACILLIN SODIUM AND TAZOBACTAM SODIUM 4.5 G: 4; .5 INJECTION, POWDER, LYOPHILIZED, FOR SOLUTION INTRAVENOUS at 01:08

## 2022-08-18 RX ADMIN — Medication 10 ML: at 12:08

## 2022-08-18 RX ADMIN — ONDANSETRON 8 MG: 8 TABLET, ORALLY DISINTEGRATING ORAL at 05:08

## 2022-08-18 RX ADMIN — SMOFLIPID 250 ML: 6; 6; 5; 3 INJECTION, EMULSION INTRAVENOUS at 10:08

## 2022-08-18 RX ADMIN — Medication 10 ML: at 11:08

## 2022-08-18 RX ADMIN — PIPERACILLIN SODIUM AND TAZOBACTAM SODIUM 4.5 G: 4; .5 INJECTION, POWDER, LYOPHILIZED, FOR SOLUTION INTRAVENOUS at 05:08

## 2022-08-18 RX ADMIN — OXYCODONE HYDROCHLORIDE 5 MG: 5 SOLUTION ORAL at 02:08

## 2022-08-18 RX ADMIN — MAGNESIUM SULFATE HEPTAHYDRATE: 500 INJECTION, SOLUTION INTRAMUSCULAR; INTRAVENOUS at 10:08

## 2022-08-18 RX ADMIN — METOROPROLOL TARTRATE 5 MG: 5 INJECTION, SOLUTION INTRAVENOUS at 05:08

## 2022-08-18 RX ADMIN — METOPROLOL SUCCINATE 25 MG: 25 TABLET, EXTENDED RELEASE ORAL at 08:08

## 2022-08-18 RX ADMIN — ASPIRIN 81 MG: 81 TABLET, COATED ORAL at 06:08

## 2022-08-18 RX ADMIN — OXYCODONE HYDROCHLORIDE 5 MG: 5 SOLUTION ORAL at 06:08

## 2022-08-18 RX ADMIN — OXYCODONE HYDROCHLORIDE 5 MG: 5 SOLUTION ORAL at 08:08

## 2022-08-18 NOTE — ASSESSMENT & PLAN NOTE
77F PMH endometrial cancer Stage 1A grade 1 s/p TLH/BSO (2016), she now presents with a pelvic mass with compression of rectosigmoid colon.  Multiple biopsies have been performed including rectal biopsies of the mass resulting as necrotic and inflammatory tissue and vaginal cuff biopsy without any cancerous cells identified.  She is severely malnourished and debilitated with an albumin of 1.4 and prealbumin of 8 on admission.  Has A-fib (previously on Eliquis) last echo 8/11/22 EF 55-60%. Bilateral lower extremity venous US showed Right and Left DVT, occlusive on right side. MRI 8/13/2022 showed two indeterminate enhancing hepatic lesions, largest within the right hepatic dome.  Cannot exclude metastatic disease, recommended further evaluation with tissue sampling.     Plan:  - Malnutrition: TPN; Weekly prealbumin, INR, and triglycerides  - Regular diet as tolerated  - PPX: lovenox  - metoprolol succinate increased to 50mg due to persistent tachycardia and with lopressor 5mg PRN  - F/u pathology on IR liver specimens  - Dickson in place   - PT/OT to work with patient in setting of deconditioned state  - After further consideration patient is not a candidate for surgical intervention due to multiple comorbidities and pathology finding will not impact management regarding pelvic mass. After discussion with patient and family, they have agreed to transition to comfort care and hospice. Due to deconditioned state we encourage inpatient hospice.   - Palliative and SW in case. Will continue discussions and appreciate recommendations.     Dispo: transition to inpatient hospice and comfort care

## 2022-08-18 NOTE — PROGRESS NOTES
S: Patient reporting poorly controlled pain this AM. No appetite, has not had PO intake. Family at bedside concerned about her extremity swelling. POD#3 s/p liver biopsy per IR, pathology preliminary (per note from Roberto Davis and Gianluca 8/17) showed vascular tissue and likely not helpful in determining etiology of her neoplasm. Family with questions regarding home hospice.     O:  Temp:  [97.6 °F (36.4 °C)-98.5 °F (36.9 °C)] 97.9 °F (36.6 °C)  Pulse:  [] 133  Resp:  [16-20] 20  SpO2:  [86 %-95 %] 91 %  BP: (103-123)/(57-80) 123/72    No acute distress AxO x3  Abdomen soft, non-tender     A/P:  - s/p RA-TLH/BSO in 2016 in the setting of Stage IAI Endometrial Cancer  - Pelvic mass of unknown etiology now with concern for invasion into bladder, rectum, and possibly vagina  - Evaluated by staff, Gynecology Oncology available for surgical intervention as planned per primary team  - Agree with medical optimization before surgery attempted due to patient's multiple co morbidities and clinical status  - Per CRS note with Dr Davis, liver biopsy prelim showed vascular tissue, likely not helping in determining etiology of pelvic neoplasm. Patient unlikely to tolerate surgery or adjuvant treatment, CRS and palliative medicine discussed with patient.    - Patient and family have made decision to transition to comfort care and home hospice.   - Spoke with nursing re pain medication.   - Thank you for your consult. We will sign off. Please page [phone] with any concerns.     Hiral Roe MD  PGY 2  Obstetrics and Gynecology

## 2022-08-18 NOTE — ASSESSMENT & PLAN NOTE
77 year old female with large pelvic mass of unknown etiology involving the rectum, vaginal cuff, and bladder. Biopsies thus far have been inconclusive. Palliative consulted for GOC. Now s/p liver bx 8/15. Final path pending but prelim read indicating liver lesion is vascular in nature. Pelvis mass etiology remains unknown       Surrogate decision maker: Legal NOK is patient's 6 children. Patient verbalized 8/16 that she would want her significant other and children to make decisions together      GOC/ACP  8/17/22 Met with patient and significant other at bedside. Discussed that unfortunately it does not seem that the liver bx is going to give us any answers. Provided support.  Even if it did reveal malignancy expressed my concern that patient's functional status has continued to decline despite TPN. I do not think she would be strong enough to undergo any cancer directed therapies. Shared that I do not think the patient will ever get better. Discussed that when we cannot fix the underlying problem we recommend shifting our focus toward her comfort and maximizing her quality of life for however long she has left. Patient was clear that if she is not going to get better she wants to be home and she wants to be comfortable.   Called patient's daughter to see what the children have been discussing. Daughter Muriel said that she has talked with Dr. Davis and the children are in agreement that comfort focused care is the best way to take care of the patient. They expressed wanting to continue artificial nutrition to give the patient as much meaningful time as possible. They also expressed concerns with being able to care for patient at home. Shared that I believe they would be able to manage the patient at home with the support of hospice. Also explained that if they want to continue artificial nutrition this is not an option in nursing homes. Said that I would reach out to Dr. Davis to ensure we are all on the  same page and that the next step would be working with SW to find an agency. io    Discussed above with Dr. Davis     -Patient and family all seem to be onboard with transitioning to comfort focused care with hospice. Appreciate SW working out the details with family and arranging. If family wants to continue artificial nutrition home hospice is the only option       8/16/22 Met with patient at bedside. Re-introduced palliative medicine since patient was unable to participate. Discussed my worry that despite best efforts with therapy and boosting nutrition patient will never be a candidate for an extensive surgery. Patient expressed understanding. Patient is hopeful that the bx results will lead to potential cancer treatment options that will improve her quality of life and give her as much meaningful time as possible. Explained that we share in her hope.         8/12/22  -Met with patient, significant other, 1 son, and daughter in law at bedside. Patient too sleepy to participate. Introduced palliative medicine. Significant other shared their journey since mass was first discovered in May. Patient has had a gradual decline since this time with a sharp decline over the past month. Prior to this patient is described as very active and always on the go. Family understands she cannot undergo surgery at this time. Family is hoping that patient will get stronger with TPN and PT/OT. They are hopeful that a tissue dx can be obtained. They are open to continued discussions as more is known.

## 2022-08-18 NOTE — SUBJECTIVE & OBJECTIVE
Interval Hx: Patient awake in bed. Only complaint is feeling hot. Denies pain. Denies shortness of breath. Remains on TPN. Taking in very little PO. No appetite.    Prelim path of liver lesion is vascular in nature. Unlikely to determine the etiology of the pelvic mass     Significant other at bedside.    Past Medical History:   Diagnosis Date    Anemia     Aortic stenosis     Hypertension     Paroxysmal atrial fibrillation     Sleep apnea     Uterine cancer        Past Surgical History:   Procedure Laterality Date    APPENDECTOMY  1991    [employer], [place], [place]    BILATERAL SALPINGO-OOPHORECTOMY (BSO) Bilateral 2016    [place], [place]     SECTION  1967    [facility], [place], [place]    GALLBLADDER SURGERY      [facility], [place]    GASTRIC BYPASS  1988    [facility], [place], [place]    HERNIA REPAIR  1988    Removal of excess fat ([facility], [place], [place])    HERNIA REPAIR  2005    [employer] ([place], [place])    HERNIA REPAIR  2018    [place], [place]    HYSTERECTOMY Bilateral 2016    [place], [place]    KNEE SURGERY Right 2004    [employer], ([place], [place])    KNEE SURGERY Left 2013    [employer], ([place], [place])       Review of patient's allergies indicates:  No Known Allergies    Medications:  Continuous Infusions:   dextrose 10 % in water (D10W)      dextrose 5 % and 0.9 % NaCl Stopped (08/15/22 1916)    TPN ADULT CENTRAL LINE CUSTOM 45 mL/hr at 22 1845    TPN ADULT CENTRAL LINE CUSTOM       Scheduled Meds:   aspirin  81 mg Oral Daily    enoxaparin  120 mg Subcutaneous Q12H    lipid (SMOFLIPID)  250 mL Intravenous Daily    metoprolol succinate  25 mg Oral Daily    mupirocin   Nasal BID    piperacillin-tazobactam (ZOSYN) IVPB  4.5 g Intravenous Q8H    sodium chloride 0.9%  10 mL Intravenous Q6H     PRN Meds:acetaminophen, calcium carbonate, dextrose 10 % in water (D10W), dextrose 10%, dextrose 10%, glucagon (human recombinant), loperamide,  melatonin, methocarbamoL, metoprolol, naloxone, ondansetron, oxyCODONE **AND** oxyCODONE, sodium chloride 0.9%, sodium chloride 0.9%, Flushing PICC Protocol **AND** sodium chloride 0.9% **AND** sodium chloride 0.9%    Family History    None       Tobacco Use    Smoking status: Never Smoker    Smokeless tobacco: Never Used   Substance and Sexual Activity    Alcohol use: Not Currently    Drug use: Not Currently    Sexual activity: Not Currently       Review of Systems   Objective:     Vital Signs (Most Recent):  Temp: 98.3 °F (36.8 °C) (08/17/22 1724)  Pulse: 106 (08/17/22 1844)  Resp: 20 (08/17/22 1722)  BP: (!) 106/57 (08/17/22 1902)  SpO2: (!) 94 % (08/17/22 1902)   Vital Signs (24h Range):  Temp:  [98 °F (36.7 °C)-98.5 °F (36.9 °C)] 98.3 °F (36.8 °C)  Pulse:  [] 106  Resp:  [16-20] 20  SpO2:  [86 %-98 %] 94 %  BP: (103-120)/(57-77) 106/57     Weight: (!) 149.7 kg (330 lb)  Body mass index is 54.91 kg/m².    Physical Exam  Vitals and nursing note reviewed.   Constitutional:       General: She is not in acute distress.     Appearance: She is ill-appearing.   HENT:      Head: Normocephalic.      Right Ear: External ear normal.      Left Ear: External ear normal.      Nose: Nose normal.      Mouth/Throat:      Mouth: Mucous membranes are moist.   Eyes:      Pupils: Pupils are equal, round, and reactive to light.   Cardiovascular:      Rate and Rhythm: Tachycardia present.   Pulmonary:      Effort: Pulmonary effort is normal. No respiratory distress.   Abdominal:      General: There is no distension.   Musculoskeletal:      Cervical back: Normal range of motion.      Right lower leg: Edema present.      Left lower leg: Edema present.   Neurological:      Mental Status: She is alert and oriented to person, place, and time.   Psychiatric:         Mood and Affect: Mood normal.       Review of Symptoms      Symptom Assessment (ESAS 0-10 Scale)  Pain:  0  Dyspnea:  0  Anxiety:  0  Nausea:  0  Depression:  4  Anorexia:   10  Fatigue:  7  Insomnia:  0  Restlessness:  0  Agitation:  0 due to Other     CAM / Delirium:  Negative  Constipation:  Negative  Diarrhea:  Positive      Bowel Management Plan (BMP):  Yes      Pain Assessment in Advanced Demential Scale (PAINAD)   Breathing - Independent of vocalization:  0  Negative vocalization:  0  Facial expression:  0  Body language:  0  Consolability:  0  Total:  0    Modified Nafisa Scale:  0    ECOG Performance Status thGthrthathdtheth:th th5th Living Arrangement: Lives with significant other.    Psychosocial/Cultural: Pt has been with significant other for many years. Pt has 6 children. Enjoys playing [employer] and traveling in her RV    Spiritual:  F - Carlee and Belief:  Mu-ism    Patient too sleepy to answer ESAS       Advance Care Planning   Advance Directives:   Living Will: No    LaPOST: No    Do Not Resuscitate Status: No    Medical Power of : No      Decision Making:  Family answered questions and Patient answered questions       Significant Labs: All pertinent labs within the past 24 hours have been reviewed.  CBC:   Recent Labs   Lab 08/17/22  1331   WBC 17.91*   HGB 8.1*   HCT 25.6*   MCV 87          BMP:  Recent Labs   Lab 08/17/22  0409   *   *   K 3.8      CO2 23   BUN 51*   CREATININE 0.8   CALCIUM 8.6*   MG 2.4       LFT:  Lab Results   Component Value Date     (H) 08/17/2022    ALKPHOS 2,262 (H) 08/17/2022    BILITOT 1.4 (H) 08/17/2022     Albumin:   Albumin   Date Value Ref Range Status   08/17/2022 1.3 (L) 3.5 - 5.2 g/dL Final     Protein:   Total Protein   Date Value Ref Range Status   08/17/2022 5.6 (L) 6.0 - 8.4 g/dL Final     Lactic acid:   Lab Results   Component Value Date    LACTATE 2.0 08/11/2022    LACTATE 1.8 08/10/2022       Significant Imaging: I have reviewed all pertinent imaging results/findings within the past 24 hours.    CT abdomen/pelvis 6/22/22  Impression:     1. Large, round heterogeneous soft tissue mass centered within the  expected region of the vaginal cuff and likely distending the vagina.  Difficult to delineate fat planes, but appears to compress or directly invade the rectum.  Primary rectal neoplasm thought less likely given patient history.  Recommend correlation with previous sigmoidoscopy.  Additional mass effect on the bladder without definite invasion.  No suspicious lymphadenopathy.  2. Multiple ill-defined hypodensities in the liver.  Recommend further evaluation with contrast-enhanced MRI abdomen in order to exclude metastasis.  3. Bilateral hydronephrosis with distended urinary bladder, likely secondary to outlet obstruction from large pelvic mass.  4. Midline ventral abdominal hernia containing multiple loops of small bowel and mesentery without evidence of complication.  5. Hepatomegaly.  6. Additional findings as above.  This report was flagged in Epic as abnormal.

## 2022-08-18 NOTE — ASSESSMENT & PLAN NOTE
- left heel noted to have blood blister.  - skin intact. No open wounds noted.  - paint with betadine daily.  - heel foam applied.  - pt wearing heel protector boots.

## 2022-08-18 NOTE — ASSESSMENT & PLAN NOTE
77 year old female with large pelvic mass of unknown etiology involving the rectum, vaginal cuff, and bladder. Biopsies thus far have been inconclusive. Palliative consulted for GOC. Now s/p liver bx 8/15. Final path pending but prelim read indicating liver lesion is vascular in nature. Pelvis mass etiology remains unknown       Surrogate decision maker: Legal NOK is patient's 6 children. Patient verbalized 8/16 that she would want her significant other and children to make decisions together      GOC/ACP  8/18  Met with family. Pt hemodynamically stable with symptoms well met.  P{t needing total care.  Exploring options for hospice.  Inpt hospice referral made and will liekly need to consider eventual home hospice options if they can continue LORETO through TPN/PPN.  Discussed with pt lack of appetite, normalizing the sign of advancing death and addressed pt's usual needs at the end of life with a focus on symptom mgmt, personal and dignity care, with prevention of physical suffering. Family worries that stopping LORETO would be akin to hastening death.       I worry that TPN/PPN may be a barrier to hospice acceptance or even nursing home acceptance.     Communicated with Dr. Wood, , and nursing.    A total of 40 min was spent on advance care planning, goals of care discussion, emotional support, formulating and communicating prognosis and goals of care, exploring burden/benefit of various approaches of treatment.       8/17/22 Met with patient and significant other at bedside. Discussed that unfortunately it does not seem that the liver bx is going to give us any answers. Provided support.  Even if it did reveal malignancy expressed my concern that patient's functional status has continued to decline despite TPN. I do not think she would be strong enough to undergo any cancer directed therapies. Shared that I do not think the patient will ever get better. Discussed that when we cannot fix the underlying problem we  recommend shifting our focus toward her comfort and maximizing her quality of life for however long she has left. Patient was clear that if she is not going to get better she wants to be home and she wants to be comfortable.   Called patient's daughter to see what the children have been discussing. Daughter Muriel said that she has talked with Dr. Davis and the children are in agreement that comfort focused care is the best way to take care of the patient. They expressed wanting to continue artificial nutrition to give the patient as much meaningful time as possible. They also expressed concerns with being able to care for patient at home. Shared that I believe they would be able to manage the patient at home with the support of hospice. Also explained that if they want to continue artificial nutrition this is not an option in nursing homes. Said that I would reach out to Dr. Davis to ensure we are all on the same page and that the next step would be working with SW to find an agency. io    Discussed above with Dr. Davis     -Patient and family all seem to be onboard with transitioning to comfort focused care with hospice. Appreciate SW working out the details with family and arranging. If family wants to continue artificial nutrition home hospice is the only option       8/16/22 Met with patient at bedside. Re-introduced palliative medicine since patient was unable to participate. Discussed my worry that despite best efforts with therapy and boosting nutrition patient will never be a candidate for an extensive surgery. Patient expressed understanding. Patient is hopeful that the bx results will lead to potential cancer treatment options that will improve her quality of life and give her as much meaningful time as possible. Explained that we share in her hope.         8/12/22  -Met with patient, significant other, 1 son, and daughter in law at bedside. Patient too sleepy to participate. Introduced  palliative medicine. Significant other shared their journey since mass was first discovered in May. Patient has had a gradual decline since this time with a sharp decline over the past month. Prior to this patient is described as very active and always on the go. Family understands she cannot undergo surgery at this time. Family is hoping that patient will get stronger with TPN and PT/OT. They are hopeful that a tissue dx can be obtained. They are open to continued discussions as more is known.

## 2022-08-18 NOTE — PROGRESS NOTES
Rigo oswaldo [facility]  Palliative Medicine  Progress Note    Patient Name: Linda Pierson  MRN: [id number]  Admission Date: 8/11/2022  Hospital Length of Stay: 7 days  Code Status: DNR   Attending Provider: SALAS Davis MD  Consulting Provider: Jin Siddiqui MD  Primary Care Physician: Primary Doctor No  Principal Problem:Pelvic mass    Patient information was obtained from patient, relative(s), past medical records and primary team.      Assessment/Plan:     Palliative care encounter  77 year old female with large pelvic mass of unknown etiology involving the rectum, vaginal cuff, and bladder. Biopsies thus far have been inconclusive. Palliative consulted for GOC. Now s/p liver bx 8/15. Final path pending but prelim read indicating liver lesion is vascular in nature. Pelvis mass etiology remains unknown       Surrogate decision maker: Legal NOK is patient's 6 children. Patient verbalized 8/16 that she would want her significant other and children to make decisions together      GOC/ACP  8/18  Met with family. Pt hemodynamically stable with symptoms well met.  P{t needing total care.  Exploring options for hospice.  Inpt hospice referral made and will liekly need to consider eventual home hospice options if they can continue LORETO through TPN/PPN.  Discussed with pt lack of appetite, normalizing the sign of advancing death and addressed pt's usual needs at the end of life with a focus on symptom mgmt, personal and dignity care, with prevention of physical suffering. Family worries that stopping LORETO would be akin to hastening death.       I worry that TPN/PPN may be a barrier to hospice acceptance or even nursing home acceptance.     Communicated with Dr. Wood, , and nursing.    A total of 40 min was spent on advance care planning, goals of care discussion, emotional support, formulating and communicating prognosis and goals of care, exploring burden/benefit of various approaches of treatment.       8/17/22 Met  with patient and significant other at bedside. Discussed that unfortunately it does not seem that the liver bx is going to give us any answers. Provided support.  Even if it did reveal malignancy expressed my concern that patient's functional status has continued to decline despite TPN. I do not think she would be strong enough to undergo any cancer directed therapies. Shared that I do not think the patient will ever get better. Discussed that when we cannot fix the underlying problem we recommend shifting our focus toward her comfort and maximizing her quality of life for however long she has left. Patient was clear that if she is not going to get better she wants to be home and she wants to be comfortable.   Called patient's daughter to see what the children have been discussing. Daughter Muriel said that she has talked with Dr. Davis and the children are in agreement that comfort focused care is the best way to take care of the patient. They expressed wanting to continue artificial nutrition to give the patient as much meaningful time as possible. They also expressed concerns with being able to care for patient at home. Shared that I believe they would be able to manage the patient at home with the support of hospice. Also explained that if they want to continue artificial nutrition this is not an option in nursing homes. Said that I would reach out to Dr. Davis to ensure we are all on the same page and that the next step would be working with SW to find an agency. io    Discussed above with Dr. Davis     -Patient and family all seem to be onboard with transitioning to comfort focused care with hospice. Appreciate SW working out the details with family and arranging. If family wants to continue artificial nutrition home hospice is the only option       8/16/22 Met with patient at bedside. Re-introduced palliative medicine since patient was unable to participate. Discussed my worry that despite best  efforts with therapy and boosting nutrition patient will never be a candidate for an extensive surgery. Patient expressed understanding. Patient is hopeful that the bx results will lead to potential cancer treatment options that will improve her quality of life and give her as much meaningful time as possible. Explained that we share in her hope.         8/12/22  -Met with patient, significant other, 1 son, and daughter in law at bedside. Patient too sleepy to participate. Introduced palliative medicine. Significant other shared their journey since mass was first discovered in May. Patient has had a gradual decline since this time with a sharp decline over the past month. Prior to this patient is described as very active and always on the go. Family understands she cannot undergo surgery at this time. Family is hoping that patient will get stronger with TPN and PT/OT. They are hopeful that a tissue dx can be obtained. They are open to continued discussions as more is known.             I will follow-up with patient. Please contact us if you have any additional questions.    Subjective:     Chief Complaint: No chief complaint on file.      HPI:   77 y.o. with history of Afib, Aortic stenosis, MO, with history of Stage IAG1 endometrial cancer s/p robotic hysterectomy and staging 9/2016. Pt now with large pelvic mass involving the rectum, bladder and vaginal cuff. Multiple biopsies have been inconclusive. Patient has had significant functional decline over the past month to the point of being bedbound. Appetite has declined. Concern that patient is too high risk for exenteration. Primary team working on improving functional status. Palliative consulted for [facility]      Hospital Course:  No notes on file    Interval Hx: Patient awake in bed. Only complaint is lack of appetiet and feeling tired. Denies pain. Denies shortness of breath. Remains on TPN and reports increased swelling    Prelim path of liver lesion is vascular  in nature. Unlikely to determine the etiology of the pelvic mass     Significant other at bedside along with all family members, including 3 adult children    Past Medical History:   Diagnosis Date    Anemia     Aortic stenosis     Hypertension     Paroxysmal atrial fibrillation     Sleep apnea     Uterine cancer        Past Surgical History:   Procedure Laterality Date    APPENDECTOMY  1991    [facility] [place]    BILATERAL SALPINGO-OOPHORECTOM[place] ([place]) [place]     SECTION  1967    [facility], [place], [place]    GALLBLADDER [facility]GERY      [facility] [place]    [facility]    [facility] [place]    HERNIA REPAIR  1988    [facility] of [place] [place]    HERNIA REPAIR  2005    [employer] ([place]    [facility]ERN[facility]PA[facility]  2018    [facility] [place]    HY[place]    KNEE SURGERY Right 2004    [employer] ([place]    KNEE SURGERY Left 2013    [employer] ([place]       Review of patient's allergies indicates:  No Known Allergies    Medications:  Continuous Infusions:   dextrose 10 % in water (D10W)      dextrose 5 % and 0.9 % NaCl 20 mL/hr at 22 0500    TPN ADULT CENTRAL LINE CUSTOM 45 mL/hr at 22 2152    TPN ADULT CENTRAL LINE CUSTOM       Scheduled Meds:   aspirin  81 mg Oral Daily    enoxaparin  120 mg Subcutaneous Q12H    lipid (SMOFLIPID)  250 mL Intravenous Daily    [START ON 2022] metoprolol succinate  50 mg Oral Daily    piperacillin-tazobactam (ZOSYN) IVPB  4.5 g Intravenous Q8H    sodium chloride 0.9%  10 mL Intravenous Q6H     PRN Meds:acetaminophen, calcium carbonate, dextrose 10 % in water (D10W), dextrose 10%, dextrose 10%, glucagon (human recombinant), loperamide, melatonin, methocarbamoL, metoprolol, naloxone, ondansetron, oxyCODONE **AND** oxyCODONE, sodium chloride 0.9%, sodium chloride  0.9%, Flushing PICC Protocol **AND** sodium chloride 0.9% **AND** sodium chloride 0.9%    Family History    None       Tobacco Use    Smoking status: Never Smoker    Smokeless tobacco: Never Used   Substance and Sexual Activity    Alcohol use: Not Currently    Drug use: Not Currently    Sexual activity: Not Currently       Review of Systems   Objective:     Vital Signs (Most Recent):  Temp: 97.3 °F (36.3 °C) (08/18/22 0804)  Pulse: (!) 128 (08/18/22 0906)  Resp: (!) 21 (08/18/22 0804)  BP: 137/60 (08/18/22 0804)  SpO2: (!) 94 % (08/18/22 0804)   Vital Signs (24h Range):  Temp:  [97.3 °F (36.3 °C)-98.5 °F (36.9 °C)] 97.3 °F (36.3 °C)  Pulse:  [] 128  Resp:  [16-21] 21  SpO2:  [86 %-95 %] 94 %  BP: (103-137)/(57-80) 137/60     Weight: (!) 149.7 kg (330 lb)  Body mass index is 54.91 kg/m².    Physical Exam  Vitals and nursing note reviewed.   Constitutional:       General: She is not in acute distress.     Appearance: She is ill-appearing.   HENT:      Head: Normocephalic.      Right Ear: External ear normal.      Left Ear: External ear normal.      Nose: Nose normal.      Mouth/Throat:      Mouth: Mucous membranes are moist.   Eyes:      Pupils: Pupils are equal, round, and reactive to light.   Cardiovascular:      Rate and Rhythm: Tachycardia present.   Pulmonary:      Effort: Pulmonary effort is normal. No respiratory distress.   Abdominal:      General: There is no distension.   Musculoskeletal:      Cervical back: Normal range of motion.      Right lower leg: Edema present.      Left lower leg: Edema present.   Neurological:      Mental Status: She is alert and oriented to person, place, and time.   Psychiatric:         Mood and Affect: Mood normal.       Review of Symptoms      Symptom Assessment (ESAS 0-10 Scale)  Pain:  0  Dyspnea:  0  Anxiety:  0  Nausea:  0  Depression:  0  Anorexia:  0  Fatigue:  7  Insomnia:  0  Restlessness:  0  Agitation:  0 due to Other     CAM / Delirium:   Negative  Constipation:  Negative  Diarrhea:  Positive      Bowel Management Plan (BMP):  Yes      Pain Assessment in Advanced Demential Scale (PAINAD)   Breathing - Independent of vocalization:  0  Negative vocalization:  0  Facial expression:  0  Body language:  0  Consolability:  0  Total:  0    Modified Nafisa Scale:  0    ECOG Performance Status thGthrthathdtheth:th th5th Living Arrangements:  Lives with family (Lives with significant other)    Psychosocial/Cultural: Pt has been with significant other for many years. Pt has 6 children. Enjoys playing [employer] and traveling in her RV    Spiritual:  F - Carlee and Belief:  Amish    Patient too sleepy to answer ESAS       Advance Care Planning   Advance Directives:   Living Will: No    LaPOST: No    Do Not Resuscitate Status: No    Medical Power of : No      Decision Making:  Family answered questions and Patient answered questions       Significant Labs: All pertinent labs within the past 24 hours have been reviewed.  CBC:   Recent Labs   Lab 08/17/22  1331   WBC 17.91*   HGB 8.1*   HCT 25.6*   MCV 87          BMP:  Recent Labs   Lab 08/18/22  0505   *   *   K 3.7      CO2 23   BUN 58*   CREATININE 0.9   CALCIUM 8.6*   MG 2.4       LFT:  Lab Results   Component Value Date     (H) 08/18/2022    ALKPHOS 1,665 (H) 08/18/2022    BILITOT 0.5 08/18/2022     Albumin:   Albumin   Date Value Ref Range Status   08/18/2022 1.3 (L) 3.5 - 5.2 g/dL Final     Protein:   Total Protein   Date Value Ref Range Status   08/18/2022 5.5 (L) 6.0 - 8.4 g/dL Final     Lactic acid:   Lab Results   Component Value Date    LACTATE 2.0 08/11/2022    LACTATE 1.8 08/10/2022       Significant Imaging: I have reviewed all pertinent imaging results/findings within the past 24 hours.    CT abdomen/pelvis 6/22/22  Impression:     1. Large, round heterogeneous soft tissue mass centered within the expected region of the vaginal cuff and likely distending the vagina.  Difficult  to delineate fat planes, but appears to compress or directly invade the rectum.  Primary rectal neoplasm thought less likely given patient history.  Recommend correlation with previous sigmoidoscopy.  Additional mass effect on the bladder without definite invasion.  No suspicious lymphadenopathy.  2. Multiple ill-defined hypodensities in the liver.  Recommend further evaluation with contrast-enhanced MRI abdomen in order to exclude metastasis.  3. Bilateral hydronephrosis with distended urinary bladder, likely secondary to outlet obstruction from large pelvic mass.  4. Midline ventral abdominal hernia containing multiple loops of small bowel and mesentery without evidence of complication.  5. Hepatomegaly.  6. Additional findings as above.  This report was flagged in Epic as abnormal.        Jin Siddiqui MD  Palliative Medicine  [facility]

## 2022-08-18 NOTE — SUBJECTIVE & OBJECTIVE
Interval Hx: Patient awake in bed. Only complaint is lack of appetiet and feeling tired. Denies pain. Denies shortness of breath. Remains on TPN and reports increased swelling    Prelim path of liver lesion is vascular in nature. Unlikely to determine the etiology of the pelvic mass     Significant other at bedside along with all family members, including 3 adult children    Past Medical History:   Diagnosis Date    Anemia     Aortic stenosis     Hypertension     Paroxysmal atrial fibrillation     Sleep apnea     Uterine cancer        Past Surgical History:   Procedure Laterality Date    APPENDECTOMY  1991    [employer], [place], [place]    BILATERAL SALPINGO-OOPHORECTOMY (BSO) Bilateral [place]    [place]     SECTION  1967    [facility], [place], [place]    GALLBLADDER SURGERY      [facility], [place]    GASTRIC BYPASS  1988    [facility], [place]    HERNIA REPAIR  1988    [facility] excess [place] ([facility] [place]    HERNIA REPAIR  2005    [employer] ([place])    [place]ERNIA RE[place]  2018    [place]    HYST[place]CTOMY Bilateral 2016    [place]    KNEE SURGERY Right 2004    [employer], ([place])    KNEE SURGERY Left 2013    [facility], ([place]       Review of patient's allergies indicates:  No Known Allergies    Medications:  Continuous Infusions:   dextrose 10 % in water (D10W)      dextrose 5 % and 0.9 % NaCl 20 mL/hr at 22 0500    TPN ADULT CENTRAL LINE CUSTOM 45 mL/hr at 22 2152    TPN ADULT CENTRAL LINE CUSTOM       Scheduled Meds:   aspirin  81 mg Oral Daily    enoxaparin  120 mg Subcutaneous Q12H    lipid (SMOFLIPID)  250 mL Intravenous Daily    [START ON 2022] metoprolol succinate  50 mg Oral Daily    piperacillin-tazobactam (ZOSYN) IVPB  4.5 g Intravenous Q8H    sodium chloride 0.9%  10 mL Intravenous Q6H     PRN Meds:acetaminophen, calcium carbonate, dextrose 10 % in water (D10W), dextrose  10%, dextrose 10%, glucagon (human recombinant), loperamide, melatonin, methocarbamoL, metoprolol, naloxone, ondansetron, oxyCODONE **AND** oxyCODONE, sodium chloride 0.9%, sodium chloride 0.9%, Flushing PICC Protocol **AND** sodium chloride 0.9% **AND** sodium chloride 0.9%    Family History    None       Tobacco Use    Smoking status: Never Smoker    Smokeless tobacco: Never Used   Substance and Sexual Activity    Alcohol use: Not Currently    Drug use: Not Currently    Sexual activity: Not Currently       Review of Systems   Objective:     Vital Signs (Most Recent):  Temp: 97.3 °F (36.3 °C) (08/18/22 0804)  Pulse: (!) 128 (08/18/22 0906)  Resp: (!) 21 (08/18/22 0804)  BP: 137/60 (08/18/22 0804)  SpO2: (!) 94 % (08/18/22 0804)   Vital Signs (24h Range):  Temp:  [97.3 °F (36.3 °C)-98.5 °F (36.9 °C)] 97.3 °F (36.3 °C)  Pulse:  [] 128  Resp:  [16-21] 21  SpO2:  [86 %-95 %] 94 %  BP: (103-137)/(57-80) 137/60     Weight: (!) 149.7 kg (330 lb)  Body mass index is 54.91 kg/m².    Physical Exam  Vitals and nursing note reviewed.   Constitutional:       General: She is not in acute distress.     Appearance: She is ill-appearing.   HENT:      Head: Normocephalic.      Right Ear: External ear normal.      Left Ear: External ear normal.      Nose: Nose normal.      Mouth/Throat:      Mouth: Mucous membranes are moist.   Eyes:      Pupils: Pupils are equal, round, and reactive to light.   Cardiovascular:      Rate and Rhythm: Tachycardia present.   Pulmonary:      Effort: Pulmonary effort is normal. No respiratory distress.   Abdominal:      General: There is no distension.   Musculoskeletal:      Cervical back: Normal range of motion.      Right lower leg: Edema present.      Left lower leg: Edema present.   Neurological:      Mental Status: She is alert and oriented to person, place, and time.   Psychiatric:         Mood and Affect: Mood normal.       Review of Symptoms      Symptom Assessment (ESAS 0-10 Scale)  Pain:   0  Dyspnea:  0  Anxiety:  0  Nausea:  0  Depression:  0  Anorexia:  0  Fatigue:  7  Insomnia:  0  Restlessness:  0  Agitation:  0 due to Other     CAM / Delirium:  Negative  Constipation:  Negative  Diarrhea:  Positive      Bowel Management Plan (BMP):  Yes      Pain Assessment in Advanced Demential Scale (PAINAD)   Breathing - Independent of vocalization:  0  Negative vocalization:  0  Facial expression:  0  Body language:  0  Consolability:  0  Total:  0    Modified Nafisa Scale:  0    ECOG Performance Status rdGrdrrdarddrderd:rd rd3rd Living Arrangements:  Lives with family (Lives with significant other)    Psychosocial/Cultural: Pt has been with significant other for many years. Pt has 6 children. Enjoys playing [employer] and traveling in her RV    Spiritual:  F - Carlee and Belief:  Uatsdin    Patient too sleepy to answer ESAS       Advance Care Planning   Advance Directives:   Living Will: No    LaPOST: No    Do Not Resuscitate Status: No    Medical Power of : No      Decision Making:  Family answered questions and Patient answered questions       Significant Labs: All pertinent labs within the past 24 hours have been reviewed.  CBC:   Recent Labs   Lab 08/17/22  1331   WBC 17.91*   HGB 8.1*   HCT 25.6*   MCV 87          BMP:  Recent Labs   Lab 08/18/22  0505   *   *   K 3.7      CO2 23   BUN 58*   CREATININE 0.9   CALCIUM 8.6*   MG 2.4       LFT:  Lab Results   Component Value Date     (H) 08/18/2022    ALKPHOS 1,665 (H) 08/18/2022    BILITOT 0.5 08/18/2022     Albumin:   Albumin   Date Value Ref Range Status   08/18/2022 1.3 (L) 3.5 - 5.2 g/dL Final     Protein:   Total Protein   Date Value Ref Range Status   08/18/2022 5.5 (L) 6.0 - 8.4 g/dL Final     Lactic acid:   Lab Results   Component Value Date    LACTATE 2.0 08/11/2022    LACTATE 1.8 08/10/2022       Significant Imaging: I have reviewed all pertinent imaging results/findings within the past 24 hours.    CT abdomen/pelvis  6/22/22  Impression:     1. Large, round heterogeneous soft tissue mass centered within the expected region of the vaginal cuff and likely distending the vagina.  Difficult to delineate fat planes, but appears to compress or directly invade the rectum.  Primary rectal neoplasm thought less likely given patient history.  Recommend correlation with previous sigmoidoscopy.  Additional mass effect on the bladder without definite invasion.  No suspicious lymphadenopathy.  2. Multiple ill-defined hypodensities in the liver.  Recommend further evaluation with contrast-enhanced MRI abdomen in order to exclude metastasis.  3. Bilateral hydronephrosis with distended urinary bladder, likely secondary to outlet obstruction from large pelvic mass.  4. Midline ventral abdominal hernia containing multiple loops of small bowel and mesentery without evidence of complication.  5. Hepatomegaly.  6. Additional findings as above.  This report was flagged in Epic as abnormal.

## 2022-08-18 NOTE — PROGRESS NOTES
08/18/22 0804   Vital Signs   Temp 97.3 °F (36.3 °C)   Temp src Axillary   Pulse (!) 127   Heart Rate Source Monitor   Resp (!) 21   SpO2 (!) 94 %   /60   MAP (mmHg) 87   BP Location Left arm   BP Method Automatic   DARINEL Mathur notified. Instructed per NP to follow up with MD regarding pt's HR. MD paged at [phone]. Awaiting response.  [phone]: MD paged again, awaiting response.  1024: MD Christian notified. New orders pending. Will continue to monitor.

## 2022-08-18 NOTE — PLAN OF CARE
POC reviewed with patient who verbalized understanding. AAOX4. Remains free of falls and injury. Frequent weight shift assistance provided.     - VSS on 3L NC except elevated HR, MD aware  - RUE skin tear, wound care assess bedside  - BLE edema  - crump     Poor tolerance of diet, no appetite, denies nausea. Pain controlled with PRN medications per MAR. Telemetry being monitored running a.fib 90-120x. Blood glucose being monitored every 4 hours. Patient denies chest pain & SOB. No acute events. No distress noted. Bed in lowest position, call light within reach, frequent rounds made for safety.     WCTM     Problem: Infection  Goal: Absence of Infection Signs and Symptoms  Outcome: Ongoing, Progressing     Problem: Skin Injury Risk Increased  Goal: Skin Health and Integrity  Outcome: Ongoing, Progressing     Problem: Fall Injury Risk  Goal: Absence of Fall and Fall-Related Injury  Outcome: Ongoing, Progressing

## 2022-08-18 NOTE — PLAN OF CARE
Problem: Adult Inpatient Plan of Care  Goal: Plan of Care Review  Outcome: Ongoing, Progressing  Goal: Optimal Comfort and Wellbeing  Outcome: Ongoing, Progressing     Problem: Bariatric Environmental Safety  Goal: Safety Maintained with Care  Outcome: Ongoing, Progressing     Problem: Infection  Goal: Absence of Infection Signs and Symptoms  Outcome: Ongoing, Progressing     Problem: Fall Injury Risk  Goal: Absence of Fall and Fall-Related Injury  Outcome: Ongoing, Progressing

## 2022-08-18 NOTE — PROGRESS NOTES
HR sustaining in 130s, /57.  MD on call notified.   EKG ordered. One time dose metoprolol ordered.   WCTM

## 2022-08-18 NOTE — PT/OT/SLP DISCHARGE
Physical Therapy Discharge Summary    Name: Linda Pierson  MRN: [id number]   Principal Problem: Pelvic mass     Patient Discharged from acute Physical Therapy on 2022.  Please refer to prior PT noted date on 2022 for functional status.     Assessment:     Patient and patient's family visited this afternoon. Patient sleeping and family requesting she be allowed to rest. PT discussed with family pt's current goals of care with change from aggressive management to more comfort based care with hospice. Discussed with family how acute care PT does not seem to align with pt's current goals. Family agreed. PT services discontinued on this date.    Objective:     GOALS:   Multidisciplinary Problems     Physical Therapy Goals        Problem: Physical Therapy    Goal Priority Disciplines Outcome Goal Variances Interventions   Physical Therapy Goal     PT, PT/OT Ongoing, Progressing     Description: Goals to be met by: 2022     Patient will increase functional independence with mobility by performin. Sit to stand transfer with Moderate Assistance  2. Bed to chair transfer with Maximum Assistance using slide board prn  3. Sitting at edge of bed x15 minutes with Supervision  4. Stand for 2 minutes with Maximum Assistance using LRAD  5. Lower extremity exercise program x30 reps per handout, with independence                     Reasons for Discontinuation of Therapy Services  Patient is unable to continue work toward goals because of medical or psychosocial complications. and Therapist determines that the patient will no longer benefit from therapy services.      Plan:     Patient Discharged to: Palliative Care/Hospice.      2022

## 2022-08-18 NOTE — PROGRESS NOTES
[facility]  Colorectal Surgery  Progress Note    Patient Name: Linda Pierson  MRN: [id number]  Admission Date: 8/11/2022  Hospital Length of Stay: 7 days  Attending Physician: SALAS Davis MD    Subjective:     Interval History: No acute overnight events, AF patient still tachycardic. Has history of afib currrently on rate control with Toprol. Path pending. Bedridden. Pain is well controlled. Tolerating regular diet but wit minimal intake.     Post-Op Info:  * No surgery found *          Medications:  Continuous Infusions:   dextrose 10 % in water (D10W)      dextrose 5 % and 0.9 % NaCl 20 mL/hr at 08/18/22 0500    TPN ADULT CENTRAL LINE CUSTOM 45 mL/hr at 08/17/22 2152    TPN ADULT CENTRAL LINE CUSTOM       Scheduled Meds:   aspirin  81 mg Oral Daily    enoxaparin  120 mg Subcutaneous Q12H    lipid (SMOFLIPID)  250 mL Intravenous Daily    metoprolol succinate  25 mg Oral Daily    piperacillin-tazobactam (ZOSYN) IVPB  4.5 g Intravenous Q8H    sodium chloride 0.9%  10 mL Intravenous Q6H     PRN Meds:   acetaminophen    calcium carbonate    dextrose 10 % in water (D10W)    dextrose 10%    dextrose 10%    glucagon (human recombinant)    loperamide    melatonin    methocarbamoL    naloxone    ondansetron    oxyCODONE    And    oxyCODONE    sodium chloride 0.9%    sodium chloride 0.9%    sodium chloride 0.9%        Objective:     Vital Signs (Most Recent):  Temp: 97.3 °F (36.3 °C) (08/18/22 0804)  Pulse: (!) 128 (08/18/22 0906)  Resp: (!) 21 (08/18/22 0804)  BP: 137/60 (08/18/22 0804)  SpO2: (!) 94 % (08/18/22 0804)   Vital Signs (24h Range):  Temp:  [97.3 °F (36.3 °C)-98.5 °F (36.9 °C)] 97.3 °F (36.3 °C)  Pulse:  [] 128  Resp:  [16-21] 21  SpO2:  [86 %-95 %] 94 %  BP: (103-137)/(57-80) 137/60     Intake/Output - Last 3 Shifts         08/16 0700 08/17 0659 08/17 0700 08/18 0659 08/18 0700 08/19 0659    P.O. 360 600     I.V. (mL/kg)       IV Piggyback 277.9 159.4     TPN  2055.9 630.3     Total Intake(mL/kg) 2693.8 (18) 1389.7 (9.3)     Urine (mL/kg/hr) 500 (0.1) 1125 (0.3)     Stool 0 0     Total Output 500 1125     Net +2193.8 +264.7            Stool Occurrence 0 x 0 x             Physical Exam  Constitutional:       Appearance: She is ill-appearing.   HENT:      Head: Normocephalic.      Nose: Nose normal.   Eyes:      Pupils: Pupils are equal, round, and reactive to light.   Cardiovascular:      Rate and Rhythm: Tachycardia present.   Pulmonary:      Effort: Pulmonary effort is normal. No respiratory distress.      Comments: NC@3L  Abdominal:      Palpations: Abdomen is soft. There is mass.      Tenderness: There is no abdominal tenderness. There is no guarding.      Hernia: A hernia (large ventral hernia) is present.   Genitourinary:     Comments: Dickson in place  Musculoskeletal:         General: Normal range of motion.      Cervical back: Full passive range of motion without pain and neck supple.   Skin:     General: Skin is warm and dry.   Neurological:      General: No focal deficit present.      Mental Status: She is alert.   Psychiatric:         Mood and Affect: Mood normal.         Behavior: Behavior normal.       Significant Labs:  BMP (Last 3 Results):   Recent Labs   Lab 08/16/22  0356 08/17/22  0409 08/18/22  0505   * 122* 139*   * 134* 135*   K 4.0 3.8 3.7    101 101   CO2 21* 23 23   BUN 49* 51* 58*   CREATININE 0.8 0.8 0.9   CALCIUM 8.4* 8.6* 8.6*   MG 2.4 2.4 2.4       CBC (Last 3 Results):   Recent Labs   Lab 08/15/22  0626 08/16/22  1015 08/17/22  1331   WBC 22.64* 14.25* 17.91*   RBC 2.96* 2.71* 2.96*   HGB 8.2* 7.5* 8.1*   HCT 25.0* 23.6* 25.6*    302 302   MCV 85 87 87   MCH 27.7 27.7 27.4   MCHC 32.8 31.8* 31.6*       CMP (Last 3 Results):   Recent Labs   Lab 08/16/22  0356 08/17/22  0409 08/18/22  0505   * 122* 139*   CALCIUM 8.4* 8.6* 8.6*   ALBUMIN 1.2* 1.3* 1.3*   PROT 5.1* 5.6* 5.5*   * 134* 135*   K 4.0 3.8 3.7    CO2 21* 23 23    101 101   BUN 49* 51* 58*   CREATININE 0.8 0.8 0.9   ALKPHOS 2,070* 2,262* 1,665*   * 201* 115*   * 540* 146*   BILITOT 1.2* 1.4* 0.5       CRP (Last 3 Results):   Recent Labs   Lab 08/16/22  0356 08/17/22  0409 08/18/22  0505   .7* 182.4* 244.4*       Coagulation:   Recent Labs   Lab 08/13/22  0419 08/15/22  0051   LABPROT  --  9.9   INR  --  0.9   APTT 33.7*  --        LFTs:   Recent Labs   Lab 08/18/22  0505   *   *   ALKPHOS 1,665*   BILITOT 0.5   PROT 5.5*   ALBUMIN 1.3*       Prealbumin (Last 3 Results):   Recent Labs   Lab 08/15/22  0051   PREALBUMIN 7*         Significant Diagnostics:  I have reviewed all pertinent imaging results/findings within the past 24 hours.    Assessment/Plan:     * Pelvic mass  77F PMH endometrial cancer Stage 1A grade 1 s/p TLH/BSO (2016), she now presents with a pelvic mass with compression of rectosigmoid colon.  Multiple biopsies have been performed including rectal biopsies of the mass resulting as necrotic and inflammatory tissue and vaginal cuff biopsy without any cancerous cells identified.  She is severely malnourished and debilitated with an albumin of 1.4 and prealbumin of 8 on admission.  Has A-fib (previously on Eliquis) last echo 8/11/22 EF 55-60%. Bilateral lower extremity venous US showed Right and Left DVT, occlusive on right side. MRI 8/13/2022 showed two indeterminate enhancing hepatic lesions, largest within the right hepatic dome.  Cannot exclude metastatic disease, recommended further evaluation with tissue sampling.     Plan:  - Malnutrition: TPN; Weekly prealbumin, INR, and triglycerides  - Regular diet as tolerated  - PPX: lovenox  - metoprolol succinate increased to 50mg due to persistent tachycardia and with lopressor 5mg PRN  - F/u pathology on IR liver specimens  - Dickson in place   - PT/OT to work with patient in setting of deconditioned state  - After further consideration patient is not a  candidate for surgical intervention due to multiple comorbidities and pathology finding will not impact management regarding pelvic mass. After discussion with patient and family, they have agreed to transition to comfort care and hospice. Due to deconditioned state we encourage inpatient hospice.   - Palliative and SW in case. Will continue discussions and appreciate recommendations.     Dispo: transition to inpatient hospice and comfort care          Danie Hough MD  Colorectal Surgery  [facility]

## 2022-08-18 NOTE — PHYSICIAN QUERY
PT Name: Linda Pierson  MR #: [id number]    DOCUMENTATION CLARIFICATION   Angelia Chapin RN, CCDS    [email or web address]    Documentation Excellence  This form is a permanent document in the medical record.     Query Date: August 18, 2022    By submitting this query, we are merely seeking further clarification of documentation..   Please utilize your independent clinical judgment when addressing the question(s) below.    The medical record contains the following:   Indicators  Supporting Clinical Findings Location in Medical Record   x Energy Intake Per chart review, pt had poor appetite without N/V. PICC in place and started on TPN. Current TPN order 50gAA, 151g D + IL providing 1213 kcal.   RD CN 8/12    Weight Loss     x  Per wt hx, pt gained 20 lb x 2 months.  RD CN 8/12    Fat Loss      Muscle Loss      Edema/Fluid Accumulation      Reduced  Strength (by dynamometer)     x Weight, BMI, Usual Body Weight Wt Readings from Last 30 Encounters:   08/11/22 (!) 149.7 kg (330 lb)   08/11/22 (!) 145 kg (319 lb 10.7 oz)   06/10/22 (!) 143.3 kg (315 lb 14.4 oz)   06/03/22 (!) 140.2 kg (309 lb)      Height (inches): 65 in           Weight (lb): (!) 330 lb  Ideal Body Weight (IBW), Female: 125 lb  % Ideal Body Weight, Female (lb): 264 %  BMI (Calculated): 54.9 RD CN 8/12    Delayed Wound Healing     x Registered Dietician Diagnosis Nutrition Problem      Increased nutrient needs (protein, energy)   Related to (etiology): Increased metabolic demands  Signs and Symptoms (as evidenced by): Pelvic mass    No indicator of malnutrition. RD CN 8/12   x Acute or Chronic Illness Pelvic mass  plan:  liver bx  Significant amount of central obesity extending laterally.    Significant lower extremity edema.    Large midline hernia   CT, the mass appears to involve the bladder   PMH endometrial cancer s/p excision (2016),           pelvic mass with compression of rectosigmoid colon,            Paroxysmal afib (on Eliquis); HTN;  sleep apnea  May 2022 - work up at that time demonstrated a large necrotic pelvic mass                      with intrusion into the rectum. Biopsies performed                     demonstrated Necro inflammatory debris with no sign of                    carcinoma H&P  CRS 8/11     Social or Environmental Circumstances     x Treatment 1. Continue Regular diet with texture per SLP  2. TPN rec: 140g D, 110g AA + IL to provide 1416kcal, 110g protein. GIR = 0.6 mg/kg/min.                - Wean TPN as PO intake increase   3. RD to monitor and follow RD CN 8/12   x Other No indicator of malnutrition. RD CN 8/12     Academy of Nutrition and Dietetics (Academy) and the American Society for Parenteral and Enteral Nutrition (A.S.P.E.N.) Clinical Characteristics to support Malnutrition   Malnutrition in the Context of Acute Illness or Injury Malnutrition in the Context of Chronic Illness or Injury Malnutrition in the Context of Social or Environmental Circumstances   Malnutrition Level Moderate Severe Moderate Severe   Moderate   Severe   Energy Intake <75%                   >7 days <50%                 >5 days <75%           >1 month <75%                      >1 month   <75% for >3 months   <50% for >1 month   Weight Loss   1-2% in 1 week >2% in 1 week 5% in 1 month >5% in 1 month 5% in 1 month >5% in 1 month    5% in 1 month >5% in 1 month 7.5% in 3 months >7.5% in 3 months 7.5% in 3 months >7.5% in 3 months    7.5% in 3 months >7.5% in 3 months 10% in 6 months >10% in 6 months 10% in 6 months >10% in 6 months        20% in 1 year                    >20% in 1 year                                                                  20% in 1 year                            >20% in 1 year                                                  Subcutaneous Fat Loss Mild  Moderate  Mild  Severe    Mild   Severe   Muscle Loss Mild depletion Moderate depletion  Mild depletion Severe Depletion   Mild   Severe   Edema/Fluid Accumulation Mild  "Moderate to severe  Mild  Severe   Mild   Severe   Reduced  Strength         (based on standards supplied by  of dynamometer) N/A Measurably reduced N/A Measurably reduced N/A Measurably reduced     Criteria for mild malnutrition is defined as 1 characteristic outlined above within the established moderate or severe parameters.  A minimum of 2 out of the 6 characteristics noted above are recommended for a diagnosis of moderate or severe malnutrition.  Chronic illness/injury is a disease/condition lasting 3 months or longer.    The noted clinical guidelines are only system guidelines and do not replace the providers clinical judgment.    Provider, please specify clinical indicators for the diagnosis of "severely malnurished".                         - possible 2 part response -     [  x] diagnosis is confirmed -    - - Specify clinical indicators supporting this confirmed diagnosis: ___energy intake and edema accumulation and reduced strength________     [  ] Ruled out - however other diagnosis ruled in:   (belinda all that apply)              (  ) increased nutritional needs              (  ) Other - specify: ___________     [  ] Other Nutritional Diagnosis (please specify): _______   [  ] Malnutrition ruled out   [  ] Clinically Undetermined     Please document in your progress notes daily for the duration of treatment until resolved and  include in your discharge summary.      References:    MY Wells., & RENARD Whittington. (2022, April). Assessment and management of anorexia and cachexia in palliative care. Retrieved May 23, 2022, from [email or web address]/assessment-and-management-of-anorexia-and-cachexia-in-palliative-care?[email or web address]=see_link     GOPI Galvez, PhD, RD, Alejo ALCANTARA P., PhD, RN, ORVILLE Jennings MD, PhD, Park MALIK A., MS, RD, CNSC, NAIN Anguiano, MS, RD, The Academy Malnutrition Work Group, The A.S.P.E.N. Board of Directors. (2012). Consensus Statement: Academy of " Nutrition and Dietetics and American Society for Parenteral and Enteral Nutrition: Characteristics Recommended for the Identification and Documentation of Adult Malnutrition (Undernutrition). Journal of Parenteral and Enteral Nutrition, 36(3), 275-283. doi:10.1177/7844668597825125     Form No. 18563

## 2022-08-18 NOTE — CONSULTS
[facility]  Skin Integrity KASI  Consult Note    Patient Name: Linda Pierson  MRN: [id number]  Admission Date: 8/11/2022  Hospital Length of Stay: 7 days  Attending Physician: SALAS Davis MD  Primary Care Provider: Primary Doctor No     Consults  Subjective:     History of Present Illness:  Linda Pierson is a 77 year old female with PMH of endometrial cancer s/p excision (2016), pelvic mass with compression of rectosigmoid colon, A-fib (on Eliquis) presenting with weakness. Pt is known to CRS service after initially presenting with hematochezia in May 2022 - work up at that time demonstrated a large necrotic pelvic mass with intrusion into the rectum. Biopsies performed demonstrated necroinflammatory debris with no sign of carcinoma. Pt presents today as transfer from an OSH - pt had reportedly fallen 2 days ago and has since had increasing weakness and diarrhea, prompting her family to bring her to the ED. Pt states the weakness has been present for months but she does feel her diarrhea is getting worse. Skin integrity KASI consulted for evaluation of skin injuries.        Scheduled Meds:   aspirin  81 mg Oral Daily    enoxaparin  120 mg Subcutaneous Q12H    lipid (SMOFLIPID)  250 mL Intravenous Daily    [START ON 8/19/2022] metoprolol succinate  50 mg Oral Daily    piperacillin-tazobactam (ZOSYN) IVPB  4.5 g Intravenous Q8H    sodium chloride 0.9%  10 mL Intravenous Q6H     Continuous Infusions:   dextrose 10 % in water (D10W)      dextrose 5 % and 0.9 % NaCl 20 mL/hr at 08/18/22 0500    TPN ADULT CENTRAL LINE CUSTOM 45 mL/hr at 08/17/22 2152    TPN ADULT CENTRAL LINE CUSTOM       PRN Meds:acetaminophen, calcium carbonate, dextrose 10 % in water (D10W), dextrose 10%, dextrose 10%, glucagon (human recombinant), loperamide, melatonin, methocarbamoL, metoprolol, naloxone, ondansetron, oxyCODONE **AND** oxyCODONE, sodium chloride 0.9%, sodium chloride 0.9%, Flushing PICC Protocol **AND** sodium  chloride 0.9% **AND** sodium chloride 0.9%    Review of patient's allergies indicates:  No Known Allergies     Past Medical History:   Diagnosis Date    Anemia     Aortic stenosis     Hypertension     Paroxysmal atrial fibrillation     Sleep apnea     Uterine cancer      Past Surgical History:   Procedure Laterality Date    APPENDECTOMY  1991    [employer], [place], [place]    BILATERAL SALPINGO-OOPHORECTOMY (BSO) [place]ilateral [place]    [place]     SECTION  1967    [facility], [place], [place]    GALLBLADDER SURGERY      [facility] [place]    [facility]PASS  [facility]    [facility] [place]    HERNIA REPAIR  1988    [facility]oval of excess fat ([facility], [place]    HERNIA REPAIR  2005    [employer] ([place], [place])    HERNIA REPAIR  2018    [facility], [place]    HYSTERECTOMY Bilateral 2016    [place]    KNEE SURGERY Right 2004    [employer], ([place], [place])    KNEE SURGERY Left 2013    [employer], ([place])       Family History    None       Tobacco Use    Smoking status: Never Smoker    Smokeless tobacco: Never Used   Substance and Sexual Activity    Alcohol use: Not Currently    Drug use: Not Currently    Sexual activity: Not Currently     Review of Systems   Skin:  Positive for wound.     Objective:     Vital Signs (Most Recent):  Temp: 98 °F (36.7 °C) (22 1200)  Pulse: 104 (22 1200)  Resp: 20 (22 1200)  BP: 107/67 (22 1200)  SpO2: (!) 93 % (22 1200)   Vital Signs (24h Range):  Temp:  [97.3 °F (36.3 °C)-98.5 °F (36.9 °C)] 98 °F (36.7 °C)  Pulse:  [] 104  Resp:  [16-21] 20  SpO2:  [91 %-95 %] 93 %  BP: (103-137)/(57-80) 107/67     Weight: (!) 149.7 kg (330 lb)  Body mass index is 54.91 kg/m².  Physical Exam  Constitutional:       Appearance: Normal appearance.   Skin:     General: Skin is warm and dry.      Findings: Lesion present.   Neurological:      Mental Status: She is alert.        Laboratory:  All pertinent labs reviewed within the last 24 hours.    Diagnostic Results:  None        Assessment/Plan:          KASI Skin Integrity Evaluation    Skin Integrity KASI evaluation of patient as part of the comprehensive skin care team.   She has been admitted for 7 days. Skin injury was noted on 8/17/22. POA No.    L heel      Sacrum/buttocks            Blood blister  - left heel noted to have blood blister.  - skin intact. No open wounds noted.  - paint with betadine daily.  - heel foam applied.  - pt wearing heel protector boots.    Dermatitis associated with moisture  - consult received for evaluation of skin injuries.  - pt presented with worsening weakness.  - pt at increased risk for skin breakdown due to medical illness, body habitus, and immobility.  - blanchable redness and irritation to sacrum/buttocks due to moisture dermatitis.  - pt incontinent of stool. Dickson catheter in place for urinary incontinence.  - continue barrier cream prn and foam dressing to area.  - spoke with PT about selection of Immerse surfaces. 24 trial on standard Immerse for aggressive therapy. Recommend switching back to the bariatric Immerse surface added space and comfort of the patient. Bariatric low fall prevention bed ordered.   - strict q2h turning.  - nursing to maintain pressure injury prevention measures and continue wound care per orders.       R heel intact. No injury noted.    Thank you for your consult. I will follow-up with patient. Please contact us if you have any additional questions.       Emily Cade NP  Skin Integrity KASI  [facility]

## 2022-08-18 NOTE — CARE UPDATE
RAPID RESPONSE NURSE ROUND       Rounding completed with charge RNFrancheska for  reports patient is now DNR and transitioning to comfort care. No additional concerns verbalized at this time. Instructed to call [phone] for further concerns or assistance.

## 2022-08-18 NOTE — PT/OT/SLP PROGRESS
Occupational Therapy      Patient Name:  Linda Pierson   MRN:  [id number]    Patient visited this afternoon with pt found resting however pt's family present. Goals of visit included therapeutic discussion related to hospice decision made by family this date. Therapy discontinued at this time as comfort measures and appropriate DME to be provided by hospice services. Family agreeable and voiced appreciation. Pt's family encouraged to use pressure boots and wedge system once transitioned out of hospital for skin integrity needs. OT to sign off at this time. Please re-consult if necessary.    Yahaira Cade OT  8/18/2022

## 2022-08-18 NOTE — PLAN OF CARE
"SW spoke to pt daughter and LESLI Llamas ([phone]). Muriel stated that pt long term romantic partner ( Huy Mcmahon) "belives he can pearson decisions and I do not want him to make decisions!" SW stated understating and informed legal of discord. SW to continue to monitor.      Ophelia Castellano, SUHAS  Case Management/[facility]  [phone]    "

## 2022-08-18 NOTE — SUBJECTIVE & OBJECTIVE
Scheduled Meds:   aspirin  81 mg Oral Daily    enoxaparin  120 mg Subcutaneous Q12H    lipid (SMOFLIPID)  250 mL Intravenous Daily    [START ON 2022] metoprolol succinate  50 mg Oral Daily    piperacillin-tazobactam (ZOSYN) IVPB  4.5 g Intravenous Q8H    sodium chloride 0.9%  10 mL Intravenous Q6H     Continuous Infusions:   dextrose 10 % in water (D10W)      dextrose 5 % and 0.9 % NaCl 20 mL/hr at 22 0500    TPN ADULT CENTRAL LINE CUSTOM 45 mL/hr at 22 2152    TPN ADULT CENTRAL LINE CUSTOM       PRN Meds:acetaminophen, calcium carbonate, dextrose 10 % in water (D10W), dextrose 10%, dextrose 10%, glucagon (human recombinant), loperamide, melatonin, methocarbamoL, metoprolol, naloxone, ondansetron, oxyCODONE **AND** oxyCODONE, sodium chloride 0.9%, sodium chloride 0.9%, Flushing PICC Protocol **AND** sodium chloride 0.9% **AND** sodium chloride 0.9%    Review of patient's allergies indicates:  No Known Allergies     Past Medical History:   Diagnosis Date    Anemia     Aortic stenosis     Hypertension     Paroxysmal atrial fibrillation     Sleep apnea     Uterine cancer      Past Surgical History:   Procedure Laterality Date    APPENDECTOMY  1991    [employer], [place], [place]    BILATERAL SALPINGO-OOPHORECTOMY (BSO) Bilateral 2016    [place], [place]     SECTION  1967    [place], [place]    GALL[place] SURGERY      [place]    GASTRIC BYPASS  1988    [facility], [place] [facility]    HERNIA REPAIR  1988    [facility] [place]    HERNIA REPAIR  2005    [employer] ([place], [place])    HERNIA REPAIR  2018    [place], [place]    HYSTERECTOMY Bilateral 2016    [place], [place]    KNEE SURGERY Right 2004    [employer], ([place])    KNEE SURGERY Left 2013    [employer], ([place])       Family History    None       Tobacco Use    Smoking status: Never Smoker    Smokeless tobacco: Never Used    Substance and Sexual Activity    Alcohol use: Not Currently    Drug use: Not Currently    Sexual activity: Not Currently     Review of Systems   Skin:  Positive for wound.     Objective:     Vital Signs (Most Recent):  Temp: 98 °F (36.7 °C) (08/18/22 1200)  Pulse: 104 (08/18/22 1200)  Resp: 20 (08/18/22 1200)  BP: 107/67 (08/18/22 1200)  SpO2: (!) 93 % (08/18/22 1200)   Vital Signs (24h Range):  Temp:  [97.3 °F (36.3 °C)-98.5 °F (36.9 °C)] 98 °F (36.7 °C)  Pulse:  [] 104  Resp:  [16-21] 20  SpO2:  [91 %-95 %] 93 %  BP: (103-137)/(57-80) 107/67     Weight: (!) 149.7 kg (330 lb)  Body mass index is 54.91 kg/m².  Physical Exam  Constitutional:       Appearance: Normal appearance.   Skin:     General: Skin is warm and dry.      Findings: Lesion present.   Neurological:      Mental Status: She is alert.       Laboratory:  All pertinent labs reviewed within the last 24 hours.    Diagnostic Results:  None

## 2022-08-18 NOTE — PLAN OF CARE
CARLOTTA reached out to Garett with [facility] in regards to [place] companies. Garett stated that she will go speak with family today, 8/18/22, to discuss needs and plan of care moving forward for d.c with hospice. CARLOTTA will continue to monitor.       Ophelia Castellano LCSW  Case Management/[facility]  [phone]      2:33 PM  Family has decided to move forward with impatent hospice. Family requested [facility].CARLOTTA calling to check on alvalibity.    Ophelia Castellano LCSW  Case Management/[facility]  [phone]    2:59 PM  Spoke with admission at [facility] [phone]. Staff asked for clinical and are currently reviewing for admission. Gave admission family contact info.CARLOTTA will continue to monitor. CARLOTTA informed family of update in alvarado.      Ophelia Castellano LCSW  Case Management/[facility]  [phone]      4:17 PM  Spoke with the [facility]. Admissions unwilling to accept pt on TPN. Family unwilling to discontinue TPN at this time. [facility] calling family to offer education on hospice services. Family has be infmoerd of the unlikely slaughter of admittance into inpatient hospice while pt in on TNP by multiple services during admission to [facility]. CARLOTTA will continue to monitor.     Ophelia Castellano LCSW  Case Management/[facility]  [phone]    4:49 PM  Per family request CARLOTTA sent referral to [facility] at [address] [place] [address], [place] [address], [phone]. CARLOTTA to continue to monitor.       Ophelia aCstellano LCSW  Case Management/[facility]  [phone]

## 2022-08-18 NOTE — SUBJECTIVE & OBJECTIVE
Subjective:     Interval History: No acute overnight events, AF patient still tachycardic. Has history of afib currrently on rate control with Toprol. Path pending. Bedridden. Pain is well controlled. Tolerating regular diet but wit minimal intake.     Post-Op Info:  * No surgery found *          Medications:  Continuous Infusions:   dextrose 10 % in water (D10W)      dextrose 5 % and 0.9 % NaCl 20 mL/hr at 08/18/22 0500    TPN ADULT CENTRAL LINE CUSTOM 45 mL/hr at 08/17/22 2152    TPN ADULT CENTRAL LINE CUSTOM       Scheduled Meds:   aspirin  81 mg Oral Daily    enoxaparin  120 mg Subcutaneous Q12H    lipid (SMOFLIPID)  250 mL Intravenous Daily    metoprolol succinate  25 mg Oral Daily    piperacillin-tazobactam (ZOSYN) IVPB  4.5 g Intravenous Q8H    sodium chloride 0.9%  10 mL Intravenous Q6H     PRN Meds:   acetaminophen    calcium carbonate    dextrose 10 % in water (D10W)    dextrose 10%    dextrose 10%    glucagon (human recombinant)    loperamide    melatonin    methocarbamoL    naloxone    ondansetron    oxyCODONE    And    oxyCODONE    sodium chloride 0.9%    sodium chloride 0.9%    sodium chloride 0.9%        Objective:     Vital Signs (Most Recent):  Temp: 97.3 °F (36.3 °C) (08/18/22 0804)  Pulse: (!) 128 (08/18/22 0906)  Resp: (!) 21 (08/18/22 0804)  BP: 137/60 (08/18/22 0804)  SpO2: (!) 94 % (08/18/22 0804)   Vital Signs (24h Range):  Temp:  [97.3 °F (36.3 °C)-98.5 °F (36.9 °C)] 97.3 °F (36.3 °C)  Pulse:  [] 128  Resp:  [16-21] 21  SpO2:  [86 %-95 %] 94 %  BP: (103-137)/(57-80) 137/60     Intake/Output - Last 3 Shifts         08/16 0700  08/17 0659 08/17 0700  08/18 0659 08/18 0700  08/19 0659    P.O. 360 600     I.V. (mL/kg)       IV Piggyback 277.9 159.4     TPN 2055.9 630.3     Total Intake(mL/kg) 2693.8 (18) 1389.7 (9.3)     Urine (mL/kg/hr) 500 (0.1) 1125 (0.3)     Stool 0 0     Total Output 500 1125     Net +2193.8 +264.7            Stool Occurrence 0 x 0 x             Physical  Exam  Constitutional:       Appearance: She is ill-appearing.   HENT:      Head: Normocephalic.      Nose: Nose normal.   Eyes:      Pupils: Pupils are equal, round, and reactive to light.   Cardiovascular:      Rate and Rhythm: Tachycardia present.   Pulmonary:      Effort: Pulmonary effort is normal. No respiratory distress.      Comments: NC@3L  Abdominal:      Palpations: Abdomen is soft. There is mass.      Tenderness: There is no abdominal tenderness. There is no guarding.      Hernia: A hernia (large ventral hernia) is present.   Genitourinary:     Comments: Dickson in place  Musculoskeletal:         General: Normal range of motion.      Cervical back: Full passive range of motion without pain and neck supple.   Skin:     General: Skin is warm and dry.   Neurological:      General: No focal deficit present.      Mental Status: She is alert.   Psychiatric:         Mood and Affect: Mood normal.         Behavior: Behavior normal.       Significant Labs:  BMP (Last 3 Results):   Recent Labs   Lab 08/16/22  0356 08/17/22  0409 08/18/22  0505   * 122* 139*   * 134* 135*   K 4.0 3.8 3.7    101 101   CO2 21* 23 23   BUN 49* 51* 58*   CREATININE 0.8 0.8 0.9   CALCIUM 8.4* 8.6* 8.6*   MG 2.4 2.4 2.4       CBC (Last 3 Results):   Recent Labs   Lab 08/15/22  0626 08/16/22  1015 08/17/22  1331   WBC 22.64* 14.25* 17.91*   RBC 2.96* 2.71* 2.96*   HGB 8.2* 7.5* 8.1*   HCT 25.0* 23.6* 25.6*    302 302   MCV 85 87 87   MCH 27.7 27.7 27.4   MCHC 32.8 31.8* 31.6*       CMP (Last 3 Results):   Recent Labs   Lab 08/16/22  0356 08/17/22  0409 08/18/22  0505   * 122* 139*   CALCIUM 8.4* 8.6* 8.6*   ALBUMIN 1.2* 1.3* 1.3*   PROT 5.1* 5.6* 5.5*   * 134* 135*   K 4.0 3.8 3.7   CO2 21* 23 23    101 101   BUN 49* 51* 58*   CREATININE 0.8 0.8 0.9   ALKPHOS 2,070* 2,262* 1,665*   * 201* 115*   * 540* 146*   BILITOT 1.2* 1.4* 0.5       CRP (Last 3 Results):   Recent Labs   Lab  08/16/22  0356 08/17/22  0409 08/18/22  0505   .7* 182.4* 244.4*       Coagulation:   Recent Labs   Lab 08/13/22  0419 08/15/22  0051   LABPROT  --  9.9   INR  --  0.9   APTT 33.7*  --        LFTs:   Recent Labs   Lab 08/18/22  0505   *   *   ALKPHOS 1,665*   BILITOT 0.5   PROT 5.5*   ALBUMIN 1.3*       Prealbumin (Last 3 Results):   Recent Labs   Lab 08/15/22  0051   PREALBUMIN 7*         Significant Diagnostics:  I have reviewed all pertinent imaging results/findings within the past 24 hours.

## 2022-08-18 NOTE — PROGRESS NOTES
[facility]  Palliative Medicine  Progress Note    Patient Name: Linda Pierson  MRN: [id number]  Admission Date: 8/11/2022  Hospital Length of Stay: 6 days  Code Status: Full Code   Attending Provider: SALAS Davis MD  Consulting Provider: Juanita Hood MD  Primary Care Physician: Primary Doctor No  Principal Problem:Pelvic mass    Patient information was obtained from patient, spouse/SO, relative(s) and primary team.      Assessment/Plan:     Palliative care encounter  77 year old female with large pelvic mass of unknown etiology involving the rectum, vaginal cuff, and bladder. Biopsies thus far have been inconclusive. Palliative consulted for GOC. Now s/p liver bx 8/15. Final path pending but prelim read indicating liver lesion is vascular in nature. Pelvis mass etiology remains unknown       Surrogate decision maker: Legal NOK is patient's 6 children. Patient verbalized 8/16 that she would want her significant other and children to make decisions together      GOC/ACP  8/17/22 Met with patient and significant other at bedside. Discussed that unfortunately it does not seem that the liver bx is going to give us any answers. Provided support.  Even if it did reveal malignancy expressed my concern that patient's functional status has continued to decline despite TPN. I do not think she would be strong enough to undergo any cancer directed therapies. Shared that I do not think the patient will ever get better. Discussed that when we cannot fix the underlying problem we recommend shifting our focus toward her comfort and maximizing her quality of life for however long she has left. Patient was clear that if she is not going to get better she wants to be home and she wants to be comfortable.   Called patient's daughter to see what the children have been discussing. Daughter Muriel said that she has talked with Dr. Davis and the children are in agreement that comfort focused care is the best way to take  care of the patient. They expressed wanting to continue artificial nutrition to give the patient as much meaningful time as possible. They also expressed concerns with being able to care for patient at home. Shared that I believe they would be able to manage the patient at home with the support of hospice. Also explained that if they want to continue artificial nutrition this is not an option in nursing homes. Said that I would reach out to Dr. Davis to ensure we are all on the same page and that the next step would be working with SW to find an agency. io    Discussed above with Dr. Davis     -Patient and family all seem to be onboard with transitioning to comfort focused care with hospice. Appreciate SW working out the details with family and arranging. If family wants to continue artificial nutrition home hospice is the only option       8/16/22 Met with patient at bedside. Re-introduced palliative medicine since patient was unable to participate. Discussed my worry that despite best efforts with therapy and boosting nutrition patient will never be a candidate for an extensive surgery. Patient expressed understanding. Patient is hopeful that the bx results will lead to potential cancer treatment options that will improve her quality of life and give her as much meaningful time as possible. Explained that we share in her hope.         8/12/22  -Met with patient, significant other, 1 son, and daughter in law at bedside. Patient too sleepy to participate. Introduced palliative medicine. Significant other shared their journey since mass was first discovered in May. Patient has had a gradual decline since this time with a sharp decline over the past month. Prior to this patient is described as very active and always on the go. Family understands she cannot undergo surgery at this time. Family is hoping that patient will get stronger with TPN and PT/OT. They are hopeful that a tissue dx can be obtained. They are  open to continued discussions as more is known.             I will follow-up with patient. Please contact us if you have any additional questions.    Subjective:     Chief Complaint: No chief complaint on file.      HPI:   77 y.o. with history of Afib, Aortic stenosis, MO, with history of Stage IAG1 endometrial cancer s/p robotic hysterectomy and staging 2016. Pt now with large pelvic mass involving the rectum, bladder and vaginal cuff. Multiple biopsies have been inconclusive. Patient has had significant functional decline over the past month to the point of being bedbound. Appetite has declined. Concern that patient is too high risk for exenteration. Primary team working on improving functional status. Palliative consulted for [facility] Course:  No notes on file    Interval Hx: Patient awake in bed. Only complaint is feeling hot. Denies pain. Denies shortness of breath. Remains on TPN. Taking in very little PO. No appetite.    Prelim path of liver lesion is vascular in nature. Unlikely to determine the etiology of the pelvic mass     Significant other at bedside.    Past Medical History:   Diagnosis Date    Anemia     Aortic stenosis     Hypertension     Paroxysmal atrial fibrillation     Sleep apnea     Uterine cancer        Past Surgical History:   Procedure Laterality Date    [place]  [place]    [facility] [place]    BILATERAL SALPINGO-OOPHORECT[place] (BSO[place]     SECTION  1967    [facility] [place] [place]    [place]ALL[place]      [place]    [facility] [place]    HERNIA REPAIR  1988    [facility] of excess [place]    HERNIA REPA[place]  2005    [facility] ([place])    [facility]  2018    [facility] [place]    HYST[place]CT[place]Y [place]ilateral [place]    KNEE SURGERY Right 2004    [facility] ([place])     KNEE SURGERY Left 07/2013    [facility], ([place])       Review of patient's allergies indicates:  No Known Allergies    Medications:  Continuous Infusions:   dextrose 10 % in water (D10W)      dextrose 5 % and 0.9 % NaCl Stopped (08/15/22 1916)    TPN ADULT CENTRAL LINE CUSTOM 45 mL/hr at 08/17/22 1845    TPN ADULT CENTRAL LINE CUSTOM       Scheduled Meds:   aspirin  81 mg Oral Daily    enoxaparin  120 mg Subcutaneous Q12H    lipid (SMOFLIPID)  250 mL Intravenous Daily    metoprolol succinate  25 mg Oral Daily    mupirocin   Nasal BID    piperacillin-tazobactam (ZOSYN) IVPB  4.5 g Intravenous Q8H    sodium chloride 0.9%  10 mL Intravenous Q6H     PRN Meds:acetaminophen, calcium carbonate, dextrose 10 % in water (D10W), dextrose 10%, dextrose 10%, glucagon (human recombinant), loperamide, melatonin, methocarbamoL, metoprolol, naloxone, ondansetron, oxyCODONE **AND** oxyCODONE, sodium chloride 0.9%, sodium chloride 0.9%, Flushing PICC Protocol **AND** sodium chloride 0.9% **AND** sodium chloride 0.9%    Family History    None       Tobacco Use    Smoking status: Never Smoker    Smokeless tobacco: Never Used   Substance and Sexual Activity    Alcohol use: Not Currently    Drug use: Not Currently    Sexual activity: Not Currently       Review of Systems   Objective:     Vital Signs (Most Recent):  Temp: 98.3 °F (36.8 °C) (08/17/22 1724)  Pulse: 106 (08/17/22 1844)  Resp: 20 (08/17/22 1722)  BP: (!) 106/57 (08/17/22 1902)  SpO2: (!) 94 % (08/17/22 1902)   Vital Signs (24h Range):  Temp:  [98 °F (36.7 °C)-98.5 °F (36.9 °C)] 98.3 °F (36.8 °C)  Pulse:  [] 106  Resp:  [16-20] 20  SpO2:  [86 %-98 %] 94 %  BP: (103-120)/(57-77) 106/57     Weight: (!) 149.7 kg (330 lb)  Body mass index is 54.91 kg/m².    Physical Exam  Vitals and nursing note reviewed.   Constitutional:       General: She is not in acute distress.     Appearance: She is ill-appearing.   HENT:      Head: Normocephalic.      Right Ear:  External ear normal.      Left Ear: External ear normal.      Nose: Nose normal.      Mouth/Throat:      Mouth: Mucous membranes are moist.   Eyes:      Pupils: Pupils are equal, round, and reactive to light.   Cardiovascular:      Rate and Rhythm: Tachycardia present.   Pulmonary:      Effort: Pulmonary effort is normal. No respiratory distress.   Abdominal:      General: There is no distension.   Musculoskeletal:      Cervical back: Normal range of motion.      Right lower leg: Edema present.      Left lower leg: Edema present.   Neurological:      Mental Status: She is alert and oriented to person, place, and time.   Psychiatric:         Mood and Affect: Mood normal.       Review of Symptoms      Symptom Assessment (ESAS 0-10 Scale)  Pain:  0  Dyspnea:  0  Anxiety:  0  Nausea:  0  Depression:  4  Anorexia:  10  Fatigue:  7  Insomnia:  0  Restlessness:  0  Agitation:  0 due to Other     CAM / Delirium:  Negative  Constipation:  Negative  Diarrhea:  Positive      Bowel Management Plan (BMP):  Yes      Pain Assessment in Advanced Demential Scale (PAINAD)   Breathing - Independent of vocalization:  0  Negative vocalization:  0  Facial expression:  0  Body language:  0  Consolability:  0  Total:  0    Modified Nafisa Scale:  0    ECOG Performance Status rdGrdrrdarddrderd:rd rd3rd Living Arrangement: Lives with significant other.    Psychosocial/Cultural: Pt has been with significant other for many years. Pt has 6 children. Enjoys playing [place] and traveling in her RV    Spiritual:  F - Carlee and Belief:  [employer]    Patient too sleepy to answer ESAS       Advance Care Planning   Advance Directives:   Living Will: No    LaPOST: No    Do Not Resuscitate Status: No    Medical Power of : No      Decision Making:  Family answered questions and Patient answered questions       Significant Labs: All pertinent labs within the past 24 hours have been reviewed.  CBC:   Recent Labs   Lab 08/17/22  1331   WBC 17.91*   HGB 8.1*   HCT  25.6*   MCV 87          BMP:  Recent Labs   Lab 08/17/22  0409   *   *   K 3.8      CO2 23   BUN 51*   CREATININE 0.8   CALCIUM 8.6*   MG 2.4       LFT:  Lab Results   Component Value Date     (H) 08/17/2022    ALKPHOS 2,262 (H) 08/17/2022    BILITOT 1.4 (H) 08/17/2022     Albumin:   Albumin   Date Value Ref Range Status   08/17/2022 1.3 (L) 3.5 - 5.2 g/dL Final     Protein:   Total Protein   Date Value Ref Range Status   08/17/2022 5.6 (L) 6.0 - 8.4 g/dL Final     Lactic acid:   Lab Results   Component Value Date    LACTATE 2.0 08/11/2022    LACTATE 1.8 08/10/2022       Significant Imaging: I have reviewed all pertinent imaging results/findings within the past 24 hours.    CT abdomen/pelvis 6/22/22  Impression:     1. Large, round heterogeneous soft tissue mass centered within the expected region of the vaginal cuff and likely distending the vagina.  Difficult to delineate fat planes, but appears to compress or directly invade the rectum.  Primary rectal neoplasm thought less likely given patient history.  Recommend correlation with previous sigmoidoscopy.  Additional mass effect on the bladder without definite invasion.  No suspicious lymphadenopathy.  2. Multiple ill-defined hypodensities in the liver.  Recommend further evaluation with contrast-enhanced MRI abdomen in order to exclude metastasis.  3. Bilateral hydronephrosis with distended urinary bladder, likely secondary to outlet obstruction from large pelvic mass.  4. Midline ventral abdominal hernia containing multiple loops of small bowel and mesentery without evidence of complication.  5. Hepatomegaly.  6. Additional findings as above.  This report was flagged in Epic as abnormal.        Juanita Hood MD  Palliative Medicine  [facility]      > 50% of 65 min visit spent in chart review, face to face discussion of goals of care,  symptom assessment, coordination of care and emotional support

## 2022-08-18 NOTE — HPI
Linda Pierson is a 77 year old female with PMH of endometrial cancer s/p excision (2016), pelvic mass with compression of rectosigmoid colon, A-fib (on Eliquis) presenting with weakness. Pt is known to CRS service after initially presenting with hematochezia in May 2022 - work up at that time demonstrated a large necrotic pelvic mass with intrusion into the rectum. Biopsies performed demonstrated necroinflammatory debris with no sign of carcinoma. Pt presents today as transfer from an OSH - pt had reportedly fallen 2 days ago and has since had increasing weakness and diarrhea, prompting her family to bring her to the ED. Pt states the weakness has been present for months but she does feel her diarrhea is getting worse. Skin integrity KASI consulted for evaluation of skin injuries.

## 2022-08-18 NOTE — ASSESSMENT & PLAN NOTE
- consult received for evaluation of skin injuries.  - pt presented with worsening weakness.  - pt at increased risk for skin breakdown due to medical illness, body habitus, and immobility.  - blanchable redness and irritation to sacrum/buttocks due to moisture dermatitis.  - pt incontinent of stool. Dickson catheter in place for urinary incontinence.  - continue barrier cream prn and foam dressing to area.  - spoke with PT about selection of Immerse surfaces. 24 trial on standard Immerse for aggressive therapy. Recommend switching back to the bariatric Immerse surface added space and comfort of the patient. Bariatric low fall prevention bed ordered.   - strict q2h turning.  - nursing to maintain pressure injury prevention measures and continue wound care per orders.

## 2022-08-18 NOTE — PROGRESS NOTES
Patient then experience SOB with O2 sat on RA at 86%. 3L NC applied. MD notified.   CXR and CBC ordered  WCTM

## 2022-08-19 LAB
ALBUMIN SERPL BCP-MCNC: 1.2 G/DL (ref 3.5–5.2)
ALP SERPL-CCNC: 1170 U/L (ref 55–135)
ALT SERPL W/O P-5'-P-CCNC: 68 U/L (ref 10–44)
ANION GAP SERPL CALC-SCNC: 9 MMOL/L (ref 8–16)
AST SERPL-CCNC: 59 U/L (ref 10–40)
BILIRUB SERPL-MCNC: 0.4 MG/DL (ref 0.1–1)
BUN SERPL-MCNC: 75 MG/DL (ref 8–23)
CALCIUM SERPL-MCNC: 8.7 MG/DL (ref 8.7–10.5)
CHLORIDE SERPL-SCNC: 102 MMOL/L (ref 95–110)
CO2 SERPL-SCNC: 26 MMOL/L (ref 23–29)
CREAT SERPL-MCNC: 1 MG/DL (ref 0.5–1.4)
CRP SERPL-MCNC: 184.4 MG/L (ref 0–8.2)
EST. GFR  (NO RACE VARIABLE): 58.4 ML/MIN/1.73 M^2
GLUCOSE SERPL-MCNC: 143 MG/DL (ref 70–110)
MAGNESIUM SERPL-MCNC: 2.3 MG/DL (ref 1.6–2.6)
PHOSPHATE SERPL-MCNC: 3.7 MG/DL (ref 2.7–4.5)
POCT GLUCOSE: 153 MG/DL (ref 70–110)
POCT GLUCOSE: 159 MG/DL (ref 70–110)
POTASSIUM SERPL-SCNC: 3.6 MMOL/L (ref 3.5–5.1)
PROT SERPL-MCNC: 5.3 G/DL (ref 6–8.4)
SODIUM SERPL-SCNC: 137 MMOL/L (ref 136–145)

## 2022-08-19 PROCEDURE — A4216 STERILE WATER/SALINE, 10 ML: HCPCS | Performed by: COLON & RECTAL SURGERY

## 2022-08-19 PROCEDURE — 25000003 PHARM REV CODE 250: Performed by: STUDENT IN AN ORGANIZED HEALTH CARE EDUCATION/TRAINING PROGRAM

## 2022-08-19 PROCEDURE — 94640 AIRWAY INHALATION TREATMENT: CPT

## 2022-08-19 PROCEDURE — 63600175 PHARM REV CODE 636 W HCPCS

## 2022-08-19 PROCEDURE — 25000003 PHARM REV CODE 250

## 2022-08-19 PROCEDURE — 94761 N-INVAS EAR/PLS OXIMETRY MLT: CPT

## 2022-08-19 PROCEDURE — 84100 ASSAY OF PHOSPHORUS: CPT | Performed by: STUDENT IN AN ORGANIZED HEALTH CARE EDUCATION/TRAINING PROGRAM

## 2022-08-19 PROCEDURE — 25000003 PHARM REV CODE 250: Performed by: COLON & RECTAL SURGERY

## 2022-08-19 PROCEDURE — 99900035 HC TECH TIME PER 15 MIN (STAT)

## 2022-08-19 PROCEDURE — 80053 COMPREHEN METABOLIC PANEL: CPT | Performed by: STUDENT IN AN ORGANIZED HEALTH CARE EDUCATION/TRAINING PROGRAM

## 2022-08-19 PROCEDURE — C1751 CATH, INF, PER/CENT/MIDLINE: HCPCS

## 2022-08-19 PROCEDURE — 27000221 HC OXYGEN, UP TO 24 HOURS

## 2022-08-19 PROCEDURE — 86140 C-REACTIVE PROTEIN: CPT | Performed by: STUDENT IN AN ORGANIZED HEALTH CARE EDUCATION/TRAINING PROGRAM

## 2022-08-19 PROCEDURE — 94799 UNLISTED PULMONARY SVC/PX: CPT

## 2022-08-19 PROCEDURE — 63600175 PHARM REV CODE 636 W HCPCS: Performed by: STUDENT IN AN ORGANIZED HEALTH CARE EDUCATION/TRAINING PROGRAM

## 2022-08-19 PROCEDURE — 27100092 HC HIGH FLOW DELIVERY CANNULA

## 2022-08-19 PROCEDURE — 25000242 PHARM REV CODE 250 ALT 637 W/ HCPCS: Performed by: STUDENT IN AN ORGANIZED HEALTH CARE EDUCATION/TRAINING PROGRAM

## 2022-08-19 PROCEDURE — 20600001 HC STEP DOWN PRIVATE ROOM

## 2022-08-19 PROCEDURE — 83735 ASSAY OF MAGNESIUM: CPT | Performed by: STUDENT IN AN ORGANIZED HEALTH CARE EDUCATION/TRAINING PROGRAM

## 2022-08-19 PROCEDURE — 25000242 PHARM REV CODE 250 ALT 637 W/ HCPCS: Performed by: NURSE PRACTITIONER

## 2022-08-19 PROCEDURE — 63600175 PHARM REV CODE 636 W HCPCS: Performed by: NURSE PRACTITIONER

## 2022-08-19 RX ORDER — ENOXAPARIN SODIUM 150 MG/ML
120 INJECTION SUBCUTANEOUS EVERY 12 HOURS
Qty: 48 ML | Refills: 5 | Status: SHIPPED | OUTPATIENT
Start: 2022-08-19 | End: 2022-09-18

## 2022-08-19 RX ORDER — IPRATROPIUM BROMIDE AND ALBUTEROL SULFATE 2.5; .5 MG/3ML; MG/3ML
3 SOLUTION RESPIRATORY (INHALATION) EVERY 6 HOURS
Status: DISCONTINUED | OUTPATIENT
Start: 2022-08-19 | End: 2022-08-19

## 2022-08-19 RX ORDER — IPRATROPIUM BROMIDE AND ALBUTEROL SULFATE 2.5; .5 MG/3ML; MG/3ML
3 SOLUTION RESPIRATORY (INHALATION)
Status: DISCONTINUED | OUTPATIENT
Start: 2022-08-19 | End: 2022-08-20 | Stop reason: HOSPADM

## 2022-08-19 RX ORDER — IPRATROPIUM BROMIDE AND ALBUTEROL SULFATE 2.5; .5 MG/3ML; MG/3ML
3 SOLUTION RESPIRATORY (INHALATION) EVERY 6 HOURS PRN
Status: DISCONTINUED | OUTPATIENT
Start: 2022-08-19 | End: 2022-08-20 | Stop reason: HOSPADM

## 2022-08-19 RX ORDER — FUROSEMIDE 10 MG/ML
40 INJECTION INTRAMUSCULAR; INTRAVENOUS ONCE
Status: COMPLETED | OUTPATIENT
Start: 2022-08-19 | End: 2022-08-19

## 2022-08-19 RX ADMIN — IPRATROPIUM BROMIDE AND ALBUTEROL SULFATE 3 ML: 2.5; .5 SOLUTION RESPIRATORY (INHALATION) at 07:08

## 2022-08-19 RX ADMIN — Medication 10 ML: at 11:08

## 2022-08-19 RX ADMIN — HYDROMORPHONE HYDROCHLORIDE 0.2 MG: 1 INJECTION, SOLUTION INTRAMUSCULAR; INTRAVENOUS; SUBCUTANEOUS at 12:08

## 2022-08-19 RX ADMIN — OXYCODONE HYDROCHLORIDE 5 MG: 5 SOLUTION ORAL at 01:08

## 2022-08-19 RX ADMIN — FUROSEMIDE 40 MG: 10 INJECTION, SOLUTION INTRAMUSCULAR; INTRAVENOUS at 10:08

## 2022-08-19 RX ADMIN — OXYCODONE HYDROCHLORIDE 5 MG: 5 SOLUTION ORAL at 09:08

## 2022-08-19 RX ADMIN — ENOXAPARIN SODIUM 120 MG: 150 INJECTION SUBCUTANEOUS at 11:08

## 2022-08-19 RX ADMIN — Medication 10 ML: at 06:08

## 2022-08-19 RX ADMIN — METOPROLOL SUCCINATE 50 MG: 50 TABLET, EXTENDED RELEASE ORAL at 09:08

## 2022-08-19 RX ADMIN — OXYCODONE HYDROCHLORIDE 5 MG: 5 SOLUTION ORAL at 05:08

## 2022-08-19 RX ADMIN — IPRATROPIUM BROMIDE AND ALBUTEROL SULFATE 3 ML: 2.5; .5 SOLUTION RESPIRATORY (INHALATION) at 02:08

## 2022-08-19 RX ADMIN — Medication 10 ML: at 10:08

## 2022-08-19 RX ADMIN — IPRATROPIUM BROMIDE AND ALBUTEROL SULFATE 3 ML: 2.5; .5 SOLUTION RESPIRATORY (INHALATION) at 08:08

## 2022-08-19 RX ADMIN — OXYCODONE HYDROCHLORIDE 5 MG: 5 SOLUTION ORAL at 06:08

## 2022-08-19 RX ADMIN — PIPERACILLIN SODIUM AND TAZOBACTAM SODIUM 4.5 G: 4; .5 INJECTION, POWDER, LYOPHILIZED, FOR SOLUTION INTRAVENOUS at 05:08

## 2022-08-19 RX ADMIN — METHOCARBAMOL 500 MG: 500 TABLET ORAL at 01:08

## 2022-08-19 RX ADMIN — PIPERACILLIN SODIUM AND TAZOBACTAM SODIUM 4.5 G: 4; .5 INJECTION, POWDER, LYOPHILIZED, FOR SOLUTION INTRAVENOUS at 01:08

## 2022-08-19 RX ADMIN — OXYCODONE HYDROCHLORIDE 5 MG: 5 SOLUTION ORAL at 08:08

## 2022-08-19 RX ADMIN — Medication 10 ML: at 01:08

## 2022-08-19 NOTE — PLAN OF CARE
[facility]  Department of Hospital Medicine  [address] [place] [address]  [phone] after hours  [phone] fax    HOSPICE  ORDERS    08/19/2022    Admit to Hospice:  Home Service    Diagnoses:   Active Hospital Problems    Diagnosis  POA    *Pelvic mass [R19.00]  Yes    Dermatitis associated with moisture [L30.8]  No    Blood blister [T14.8XXA]  No    Palliative care encounter [Z51.5]  Not Applicable    Hydronephrosis [N13.30]  Yes      Resolved Hospital Problems   No resolved problems to display.       Hospice Qualifying Diagnoses:        Patient has a life expectancy < 6 months due to:  1) Primary Hospice Diagnosis:  Pelvic Mass involving rectum/colon  2) Comorbid Conditions Contributing to Decline:  Obesity, malnutrition, DVT, liver lesions    Vital Signs: Routine per Hospice Protocol.    Code Status: DNR    Allergies: Review of patient's allergies indicates:  No Known Allergies    Diet: Regular diet as tolerated    Activities: As tolerated    Goals of Care Treatment Preferences:  Code Status: DNR      Nursing: Per Hospice Routine.      Dickson Care: Empty Dickson bag Q shift and PRN.  Change Dickson every month.    Routine Skin for Bedridden Patients: Apply moisture barrier cream to all skin folds and   wet areas in perineal area daily and after baths and all bowel movements.    PICC Care:   Scrub the Hub: Prior to accessing the line, always perform a 30 second alcohol scrub  Each lumen of the central line is to be flushed at least daily with 10 mL Normal Saline and 3 mL Heparin flush (100 units/mL)  Skilled Nurse (SN) may draw blood from IV access  Date of removal: none    Oxygen: none     Other Miscellaneous Care:     Maintain hydration with lactated ringers infusion  - 1L infusion via gravity drainage twice daily, administer twice daily at 7 am and 7 pm via RUE PICC    Medications:       Medication List      START taking these medications    enoxaparin 120  mg/0.8 mL Syrg  Commonly known as: LOVENOX  Inject 0.8 mLs (120 mg total) into the skin every 12 (twelve) hours.        CONTINUE taking these medications    aspirin 81 MG EC tablet  Commonly known as: ECOTRIN  Take 81 mg by mouth Daily.     metoprolol succinate 25 MG 24 hr tablet  Commonly known as: TOPROL-XL  Take 1 tablet by mouth once daily.     oxybutynin 10 MG 24 hr tablet  Commonly known as: DITROPAN-XL  Take 1 tablet by mouth once daily.        STOP taking these medications    calcium-vitamin D 600 mg-10 mcg (400 unit) Tab     cholecalciferol (vitamin D3) 125 mcg (5,000 unit) Tab     cyanocobalamin 1000 MCG tablet  Commonly known as: VITAMIN B-12     ferrous sulfate 325 mg (65 mg iron) Tab tablet  Commonly known as: FEOSOL                Future Orders:  Hospice Medical Director may dictate new orders for comfortable care measures & sign death certificate.        _________________________________  Jeff Gonzales MD  08/19/2022

## 2022-08-19 NOTE — PLAN OF CARE
10:06 AM  CARLOTTA spoke with admission at [facility] in [place]. Stated they cannot take pt on contiuous TPN. [facility] to call family and offer education on hospice services. SW to continue to monitor.       Ophelia Castellano LCSW  Case Management/[facility]  [phone]        1:13 PM  Spoke with  who stated that family has decided to go with home hospice. SW began referral process to home hospice. SW to continue to monitor.    3:06 PM  Spoke with home hospice company. Hospice company CANNOT take pt on TPN. SW asked medical team to speak with family again on TPN and hospice services.       4:26 PM  Family agreed to take pt home on IV fluids and not TPN. CARLOTTA currently working on aquiaring dme for IV fluids for d/c. SW to continue to monitor.       Ophelia Castellano LCSW  Case Management/[facility]  [phone]

## 2022-08-19 NOTE — PROGRESS NOTES
Rigo oswaldo [facility]  Colorectal Surgery  Progress Note    Patient Name: Linda Pierson  MRN: [id number]  Admission Date: 8/11/2022  Hospital Length of Stay: 8 days  Attending Physician: SALAS Davis MD    Subjective:     Interval History: No acute overnight events, AF patient still tachycardic. Has history of afib currrently on Toprol. Path still pending. Bedridden. Pain is well controlled. Tolerating regular diet but wit minimal intake. Currently on NC@2L.    Post-Op Info:  * No surgery found *          Medications:  Continuous Infusions:   dextrose 10 % in water (D10W)      dextrose 5 % and 0.9 % NaCl 20 mL/hr at 08/18/22 0500    TPN ADULT CENTRAL LINE CUSTOM 45 mL/hr at 08/18/22 2229    TPN ADULT CENTRAL LINE CUSTOM       Scheduled Meds:   aspirin  81 mg Oral Daily    enoxaparin  120 mg Subcutaneous Q12H    lipid (SMOFLIPID)  250 mL Intravenous Daily    lipid (SMOFLIPID)  250 mL Intravenous Daily    metoprolol succinate  50 mg Oral Daily    piperacillin-tazobactam (ZOSYN) IVPB  4.5 g Intravenous Q8H    sodium chloride 0.9%  10 mL Intravenous Q6H     PRN Meds:   acetaminophen    albuterol-ipratropium    calcium carbonate    dextrose 10 % in water (D10W)    dextrose 10%    dextrose 10%    glucagon (human recombinant)    loperamide    melatonin    methocarbamoL    metoprolol    naloxone    ondansetron    oxyCODONE    And    oxyCODONE    sodium chloride 0.9%    sodium chloride 0.9%    sodium chloride 0.9%        Objective:     Vital Signs (Most Recent):  Temp: 98 °F (36.7 °C) (08/19/22 0451)  Pulse: 105 (08/19/22 0725)  Resp: 16 (08/19/22 0451)  BP: 117/68 (08/19/22 0451)  SpO2: 95 % (08/19/22 0725)   Vital Signs (24h Range):  Temp:  [96.9 °F (36.1 °C)-98 °F (36.7 °C)] 98 °F (36.7 °C)  Pulse:  [102-128] 105  Resp:  [16-21] 16  SpO2:  [93 %-98 %] 95 %  BP: ()/(56-68) 117/68     Intake/Output - Last 3 Shifts         08/17 0700 08/18 0659 08/18 0700 08/19 0659 08/19 0700 08/20  0659    P.O. 600 0     IV Piggyback 159.4      .3      Total Intake(mL/kg) 1389.7 (9.3) 0 (0)     Urine (mL/kg/hr) 1125 (0.3) 225 (0.1)     Stool 0      Total Output 1125 225     Net +264.7 -225            Stool Occurrence 0 x              Physical Exam  Constitutional:       Appearance: She is ill-appearing.   HENT:      Head: Normocephalic.      Nose: Nose normal.   Eyes:      Pupils: Pupils are equal, round, and reactive to light.   Cardiovascular:      Rate and Rhythm: Tachycardia present.   Pulmonary:      Effort: Pulmonary effort is normal. No respiratory distress.      Comments: NC@3L  Abdominal:      Palpations: Abdomen is soft. There is mass.      Tenderness: There is no abdominal tenderness. There is no guarding.      Hernia: A hernia (large ventral hernia) is present.   Genitourinary:     Comments: Dickson in place  Musculoskeletal:         General: Normal range of motion.      Cervical back: Full passive range of motion without pain and neck supple.      Right lower leg: Edema present.      Left lower leg: Edema present.   Skin:     General: Skin is warm and dry.   Neurological:      General: No focal deficit present.      Mental Status: She is alert.   Psychiatric:         Mood and Affect: Mood normal.         Behavior: Behavior normal.       Significant Labs:  BMP (Last 3 Results):   Recent Labs   Lab 08/17/22  0409 08/18/22  0505 08/19/22  0518   * 139* 143*   * 135* 137   K 3.8 3.7 3.6    101 102   CO2 23 23 26   BUN 51* 58* 75*   CREATININE 0.8 0.9 1.0   CALCIUM 8.6* 8.6* 8.7   MG 2.4 2.4 2.3       CBC (Last 3 Results):   Recent Labs   Lab 08/15/22  0626 08/16/22  1015 08/17/22  1331   WBC 22.64* 14.25* 17.91*   RBC 2.96* 2.71* 2.96*   HGB 8.2* 7.5* 8.1*   HCT 25.0* 23.6* 25.6*    302 302   MCV 85 87 87   MCH 27.7 27.7 27.4   MCHC 32.8 31.8* 31.6*       CMP (Last 3 Results):   Recent Labs   Lab 08/17/22  0409 08/18/22  0505 08/19/22  0518   * 139* 143*   CALCIUM  8.6* 8.6* 8.7   ALBUMIN 1.3* 1.3* 1.2*   PROT 5.6* 5.5* 5.3*   * 135* 137   K 3.8 3.7 3.6   CO2 23 23 26    101 102   BUN 51* 58* 75*   CREATININE 0.8 0.9 1.0   ALKPHOS 2,262* 1,665* 1,170*   * 115* 68*   * 146* 59*   BILITOT 1.4* 0.5 0.4       CRP (Last 3 Results):   Recent Labs   Lab 08/17/22  0409 08/18/22  0505 08/19/22  0518   .4* 244.4* 184.4*       Coagulation:   Recent Labs   Lab 08/13/22  0419 08/15/22  0051   LABPROT  --  9.9   INR  --  0.9   APTT 33.7*  --        LFTs:   Recent Labs   Lab 08/19/22  0518   ALT 68*   AST 59*   ALKPHOS 1,170*   BILITOT 0.4   PROT 5.3*   ALBUMIN 1.2*       Prealbumin (Last 3 Results):   Recent Labs   Lab 08/15/22  0051   PREALBUMIN 7*         Significant Diagnostics:  I have reviewed all pertinent imaging results/findings within the past 24 hours.    Assessment/Plan:     * Pelvic mass  77F PMH endometrial cancer Stage 1A grade 1 s/p TLH/BSO (2016), she now presents with a pelvic mass with compression of rectosigmoid colon.  Multiple biopsies have been performed including rectal biopsies of the mass resulting as necrotic and inflammatory tissue and vaginal cuff biopsy without any cancerous cells identified.  She is severely malnourished and debilitated with an albumin of 1.4 and prealbumin of 8 on admission.  Has A-fib (previously on Eliquis) last echo 8/11/22 EF 55-60%. Bilateral lower extremity venous US showed Right and Left DVT, occlusive on right side. MRI 8/13/2022 showed two indeterminate enhancing hepatic lesions, largest within the right hepatic dome.  Cannot exclude metastatic disease, recommended further evaluation with tissue sampling. After further consideration patient is not a candidate for surgical intervention due to multiple comorbidities and pathology finding will not impact management regarding pelvic mass. After discussion with patient and family, they have agreed to transition to comfort care and hospice. Due to  deconditioned state we encourage inpatient hospice.     Plan:  - Malnutrition: TPN; Weekly prealbumin, INR, and triglycerides  - Regular diet as tolerated  - PPX: lovenox  - metoprolol succinate 50mg due to persistent tachycardia and Hx of afib  - Duoneb  - F/u pathology on IR liver specimens  - Dickson in place   - PT/OT to work with patient in setting of deconditioned state  - Palliative and SW in case. Will continue discussions and appreciate recommendations.   - SW: currently working for inpatient hospice placement    Dispo: transition to inpatient hospice and comfort care          Danie Hough MD  Colorectal Surgery  [facility]

## 2022-08-19 NOTE — SUBJECTIVE & OBJECTIVE
Subjective:     Interval History: No acute overnight events, AF patient still tachycardic. Has history of afib currrently on Toprol. Path still pending. Bedridden. Pain is well controlled. Tolerating regular diet but wit minimal intake. Currently on NC@2L.    Post-Op Info:  * No surgery found *          Medications:  Continuous Infusions:   dextrose 10 % in water (D10W)      dextrose 5 % and 0.9 % NaCl 20 mL/hr at 08/18/22 0500    TPN ADULT CENTRAL LINE CUSTOM 45 mL/hr at 08/18/22 2229    TPN ADULT CENTRAL LINE CUSTOM       Scheduled Meds:   aspirin  81 mg Oral Daily    enoxaparin  120 mg Subcutaneous Q12H    lipid (SMOFLIPID)  250 mL Intravenous Daily    lipid (SMOFLIPID)  250 mL Intravenous Daily    metoprolol succinate  50 mg Oral Daily    piperacillin-tazobactam (ZOSYN) IVPB  4.5 g Intravenous Q8H    sodium chloride 0.9%  10 mL Intravenous Q6H     PRN Meds:   acetaminophen    albuterol-ipratropium    calcium carbonate    dextrose 10 % in water (D10W)    dextrose 10%    dextrose 10%    glucagon (human recombinant)    loperamide    melatonin    methocarbamoL    metoprolol    naloxone    ondansetron    oxyCODONE    And    oxyCODONE    sodium chloride 0.9%    sodium chloride 0.9%    sodium chloride 0.9%        Objective:     Vital Signs (Most Recent):  Temp: 98 °F (36.7 °C) (08/19/22 0451)  Pulse: 105 (08/19/22 0725)  Resp: 16 (08/19/22 0451)  BP: 117/68 (08/19/22 0451)  SpO2: 95 % (08/19/22 0725)   Vital Signs (24h Range):  Temp:  [96.9 °F (36.1 °C)-98 °F (36.7 °C)] 98 °F (36.7 °C)  Pulse:  [102-128] 105  Resp:  [16-21] 16  SpO2:  [93 %-98 %] 95 %  BP: ()/(56-68) 117/68     Intake/Output - Last 3 Shifts         08/17 0700  08/18 0659 08/18 0700  08/19 0659 08/19 0700 08/20 0659    P.O. 600 0     IV Piggyback 159.4      .3      Total Intake(mL/kg) 1389.7 (9.3) 0 (0)     Urine (mL/kg/hr) 1125 (0.3) 225 (0.1)     Stool 0      Total Output 1125 225     Net +264.7 -225            Stool Occurrence 0 x               Physical Exam  Constitutional:       Appearance: She is ill-appearing.   HENT:      Head: Normocephalic.      Nose: Nose normal.   Eyes:      Pupils: Pupils are equal, round, and reactive to light.   Cardiovascular:      Rate and Rhythm: Tachycardia present.   Pulmonary:      Effort: Pulmonary effort is normal. No respiratory distress.      Comments: NC@3L  Abdominal:      Palpations: Abdomen is soft. There is mass.      Tenderness: There is no abdominal tenderness. There is no guarding.      Hernia: A hernia (large ventral hernia) is present.   Genitourinary:     Comments: Dickson in place  Musculoskeletal:         General: Normal range of motion.      Cervical back: Full passive range of motion without pain and neck supple.      Right lower leg: Edema present.      Left lower leg: Edema present.   Skin:     General: Skin is warm and dry.   Neurological:      General: No focal deficit present.      Mental Status: She is alert.   Psychiatric:         Mood and Affect: Mood normal.         Behavior: Behavior normal.       Significant Labs:  BMP (Last 3 Results):   Recent Labs   Lab 08/17/22  0409 08/18/22  0505 08/19/22  0518   * 139* 143*   * 135* 137   K 3.8 3.7 3.6    101 102   CO2 23 23 26   BUN 51* 58* 75*   CREATININE 0.8 0.9 1.0   CALCIUM 8.6* 8.6* 8.7   MG 2.4 2.4 2.3       CBC (Last 3 Results):   Recent Labs   Lab 08/15/22  0626 08/16/22  1015 08/17/22  1331   WBC 22.64* 14.25* 17.91*   RBC 2.96* 2.71* 2.96*   HGB 8.2* 7.5* 8.1*   HCT 25.0* 23.6* 25.6*    302 302   MCV 85 87 87   MCH 27.7 27.7 27.4   MCHC 32.8 31.8* 31.6*       CMP (Last 3 Results):   Recent Labs   Lab 08/17/22  0409 08/18/22  0505 08/19/22  0518   * 139* 143*   CALCIUM 8.6* 8.6* 8.7   ALBUMIN 1.3* 1.3* 1.2*   PROT 5.6* 5.5* 5.3*   * 135* 137   K 3.8 3.7 3.6   CO2 23 23 26    101 102   BUN 51* 58* 75*   CREATININE 0.8 0.9 1.0   ALKPHOS 2,262* 1,665* 1,170*   * 115* 68*   *  146* 59*   BILITOT 1.4* 0.5 0.4       CRP (Last 3 Results):   Recent Labs   Lab 08/17/22  0409 08/18/22  0505 08/19/22  0518   .4* 244.4* 184.4*       Coagulation:   Recent Labs   Lab 08/13/22  0419 08/15/22  0051   LABPROT  --  9.9   INR  --  0.9   APTT 33.7*  --        LFTs:   Recent Labs   Lab 08/19/22 0518   ALT 68*   AST 59*   ALKPHOS 1,170*   BILITOT 0.4   PROT 5.3*   ALBUMIN 1.2*       Prealbumin (Last 3 Results):   Recent Labs   Lab 08/15/22  0051   PREALBUMIN 7*         Significant Diagnostics:  I have reviewed all pertinent imaging results/findings within the past 24 hours.

## 2022-08-19 NOTE — NURSING
Per RN request MD  bedside to discuss breathing, pain, and coughing concerns with patient/family members.    No new orders placed.

## 2022-08-19 NOTE — ASSESSMENT & PLAN NOTE
77F PMH endometrial cancer Stage 1A grade 1 s/p TLH/BSO (2016), she now presents with a pelvic mass with compression of rectosigmoid colon.  Multiple biopsies have been performed including rectal biopsies of the mass resulting as necrotic and inflammatory tissue and vaginal cuff biopsy without any cancerous cells identified.  She is severely malnourished and debilitated with an albumin of 1.4 and prealbumin of 8 on admission.  Has A-fib (previously on Eliquis) last echo 8/11/22 EF 55-60%. Bilateral lower extremity venous US showed Right and Left DVT, occlusive on right side. MRI 8/13/2022 showed two indeterminate enhancing hepatic lesions, largest within the right hepatic dome.  Cannot exclude metastatic disease, recommended further evaluation with tissue sampling. After further consideration patient is not a candidate for surgical intervention due to multiple comorbidities and pathology finding will not impact management regarding pelvic mass. After discussion with patient and family, they have agreed to transition to comfort care and hospice. Due to deconditioned state we encourage inpatient hospice.     Plan:  - Malnutrition: TPN; Weekly prealbumin, INR, and triglycerides  - Regular diet as tolerated  - PPX: lovenox  - metoprolol succinate 50mg due to persistent tachycardia and Hx of afib  - Duoneb  - F/u pathology on IR liver specimens  - Dickson in place   - PT/OT to work with patient in setting of deconditioned state  - Palliative and SW in case. Will continue discussions and appreciate recommendations.   - SW: currently working for inpatient hospice placement    Dispo: transition to inpatient hospice and comfort care

## 2022-08-19 NOTE — NURSING
Per family concerns. MD notified about pt lack of motivation to swallow liquid pain meds. One time dose of IV Diulaid ordered.    Per family concerns. MD notified of weak cough and patient unable to clear secreations. No orders given.

## 2022-08-20 VITALS
BODY MASS INDEX: 48.82 KG/M2 | HEART RATE: 110 BPM | WEIGHT: 293 LBS | OXYGEN SATURATION: 92 % | SYSTOLIC BLOOD PRESSURE: 95 MMHG | RESPIRATION RATE: 16 BRPM | HEIGHT: 65 IN | TEMPERATURE: 97 F | DIASTOLIC BLOOD PRESSURE: 52 MMHG

## 2022-08-20 PROCEDURE — 63600175 PHARM REV CODE 636 W HCPCS

## 2022-08-20 PROCEDURE — 25000242 PHARM REV CODE 250 ALT 637 W/ HCPCS: Performed by: NURSE PRACTITIONER

## 2022-08-20 RX ORDER — HYDROMORPHONE HYDROCHLORIDE 1 MG/ML
0.2 INJECTION, SOLUTION INTRAMUSCULAR; INTRAVENOUS; SUBCUTANEOUS ONCE
Status: COMPLETED | OUTPATIENT
Start: 2022-08-20 | End: 2022-08-20

## 2022-08-20 RX ADMIN — HYDROMORPHONE HYDROCHLORIDE 0.2 MG: 1 INJECTION, SOLUTION INTRAMUSCULAR; INTRAVENOUS; SUBCUTANEOUS at 01:08

## 2022-08-20 RX ADMIN — OXYCODONE HYDROCHLORIDE 5 MG: 5 SOLUTION ORAL at 01:08

## 2022-08-20 NOTE — PLAN OF CARE
"CM arranged stretcher transport via [facility], requested pickup time "now". CM called and updated patient's daughter, Muriel. CM contacted [facility] hospice nurse on-call, Katherine to provide verbal update, family has issue with bed being too small. Emailed copy of hospice orders to [email or web address]. night bedside nurse will notify hospice nurse, katherine ([phone]) when patient leaves the floor on stretcher.     CM updated treatment team and requested discharge order be placed in epic.      Bettina Farfan RN   - [facility]  [phone]  "

## 2022-08-20 NOTE — PLAN OF CARE
Problem: Adult Inpatient Plan of Care  Goal: Plan of Care Review  Outcome: Met  Goal: Patient-Specific Goal (Individualized)  Outcome: Met  Goal: Absence of Hospital-Acquired Illness or Injury  Outcome: Met  Goal: Optimal Comfort and Wellbeing  Outcome: Met  Goal: Readiness for Transition of Care  Outcome: Met     Problem: Bariatric Environmental Safety  Goal: Safety Maintained with Care  Outcome: Met     Problem: Infection  Goal: Absence of Infection Signs and Symptoms  Outcome: Met     Problem: Skin Injury Risk Increased  Goal: Skin Health and Integrity  Outcome: Met     Problem: Fall Injury Risk  Goal: Absence of Fall and Fall-Related Injury  Outcome: Met     Problem: Coping Ineffective  Goal: Effective Coping  Outcome: Met     Problem: Pain Acute  Goal: Acceptable Pain Control and Functional Ability  Outcome: Met     Problem: Impaired Wound Healing  Goal: Optimal Wound Healing  Outcome: Met

## 2022-08-23 NOTE — DISCHARGE SUMMARY
Rigo James - [facility]  Colorectal Surgery  Discharge Summary      Patient Name: Linda Pierson  MRN: [id number]  Admission Date: 8/11/2022  Hospital Length of Stay: 9 days  Discharge Date and Time: 8/20/2022  1:56 AM  Attending Physician: Idalmis att. providers found   Discharging Provider: ALIA Davis MD  Primary Care Provider: Primary Doctor No     HPI: 77F PMH endometrial cancer s/p excision (2016), pelvic mass with compression of rectosigmoid colon, A-fib (on Eliquis) presenting with weakness. Pt is known to CRS service after initially presenting with hematochezia in May 2022 - work up at that time demonstrated a large necrotic pelvic mass with intrusion into the rectum. Biopsies performed demonstrated necroinflammatory debris with no sign of carcinoma. Pt presents today as transfer from an OSH - pt had reportedly fallen 2 days ago and has since had increasing weakness and diarrhea, prompting her family to bring her to the ED. Pt states the weakness has been present for months but she does feel her diarrhea is getting worse. Denies fevers/chills. Denies abdominal pain. Endorses back pain. Endorses poor appetite without nausea or emesis.      Upon arrival to OSH ED, AVSS. WBC 28. Lactate 1.8. CRP 26.4. CT abd/pelvis obtained which demonstrated gas containing exophytic mass in the pelvis now with direct luminal continuity with the sigmoid and rectum. Marked distension of the rectum, now [id number].     * No surgery found *     Hospital Course:   Planned rehabilitation with PICC and TPN.  Urology performed bedside cystoscopy with clear invasion of the bladder wall by tumor.  From a surgical standpoint, this would entail pelvic exenteration with bilateral ostomies as well as abdominal wall reconstruction with explantation of prior mesh.  She was too ill to undergo this procedure.  Liver biopsies were obtained in an effort to get a pathologic diagnosis in order to see if there were any other forms of therapy such as  chemotherapy or radiotherapy.  Pathology was nondiagnostic.  During her time, she had increased work of breathing.  Did not appear that there were any suitable treatment options that she could tolerate.  Therefore, palliative Medicine was consulted.  She was discharged home with hospice care.                   Consults (From admission, onward)        Status Ordering Provider     Inpatient consult to Palliative Care  Once        Provider:  (Not yet assigned)    Completed KANDI GROSS     Inpatient consult to Registered Dietitian/Nutritionist  Once        Provider:  (Not yet assigned)    Completed KANDI GROSS     Inpatient consult to PICC team ([facility])  Once        Provider:  (Not yet assigned)    Completed KANDI GROSS     Inpatient consult to Urology  Once        Provider:  (Not yet assigned)    Completed KANDI GROSS     Inpatient consult to Interventional Radiology  Once        Provider:  (Not yet assigned)    Completed LEA LANE          Significant Diagnostic Studies:   Specimen (24h ago, onward)            None          Pending Diagnostic Studies:     None        Final Active Diagnoses:    Diagnosis Date Noted POA    PRINCIPAL PROBLEM:  Pelvic mass [R19.00] 08/11/2022 Yes    Dermatitis associated with moisture [L30.8] 08/18/2022 No    Blood blister [T14.8XXA] 08/18/2022 No    Palliative care encounter [Z51.5] 08/12/2022 Not Applicable    Hydronephrosis [N13.30] 08/11/2022 Yes      Problems Resolved During this Admission:      Discharged Condition: poor    Disposition: Hospice/Home    Follow Up:   Follow-up Information     ALIA Davis MD Follow up.    Specialty: Colon and Rectal Surgery  Why: Please call with questions or concerns  Contact information:  [address] [place] [address] [id number] [phone]                       Patient Instructions:      Diet Adult Regular     Notify your health care provider if you experience any of the following:  temperature >100.4      Notify your health care provider if you experience any of the following:  persistent nausea and vomiting or diarrhea     Notify your health care provider if you experience any of the following:  severe uncontrolled pain     Notify your health care provider if you experience any of the following:  redness, tenderness, or signs of infection (pain, swelling, redness, odor or green/yellow discharge around incision site)     Notify your health care provider if you experience any of the following:  difficulty breathing or increased cough     Notify your health care provider if you experience any of the following:  severe persistent headache     Notify your health care provider if you experience any of the following:  persistent dizziness, light-headedness, or visual disturbances     Notify your health care provider if you experience any of the following:  increased confusion or weakness     Activity as tolerated     Medications:  Reconciled Home Medications:      Medication List      START taking these medications    enoxaparin 120 mg/0.8 mL Syrg  Commonly known as: LOVENOX  Inject 0.8 mLs (120 mg total) into the skin every 12 (twelve) hours.        CONTINUE taking these medications    aspirin 81 MG EC tablet  Commonly known as: ECOTRIN  Take 81 mg by mouth Daily.     metoprolol succinate 25 MG 24 hr tablet  Commonly known as: TOPROL-XL  Take 1 tablet by mouth once daily.     oxybutynin 10 MG 24 hr tablet  Commonly known as: DITROPAN-XL  Take 1 tablet by mouth once daily.        STOP taking these medications    calcium-vitamin D 600 mg-10 mcg (400 unit) Tab     cholecalciferol (vitamin D3) 125 mcg (5,000 unit) Tab     cyanocobalamin 1000 MCG tablet  Commonly known as: VITAMIN B-12     ferrous sulfate 325 mg (65 mg iron) Tab tablet  Commonly known as: FEOSOL            ALIA Davis MD  Colorectal Surgery  [facility]

## 2022-09-02 LAB
COMMENT: NORMAL
FINAL PATHOLOGIC DIAGNOSIS: NORMAL
GROSS: NORMAL
Lab: NORMAL
SUPPLEMENTAL DIAGNOSIS: NORMAL

## 2022-09-05 ENCOUNTER — PATIENT MESSAGE (OUTPATIENT)
Dept: SURGERY | Facility: CLINIC | Age: 77
End: 2022-09-05
Payer: MEDICARE